# Patient Record
Sex: MALE | Race: OTHER | NOT HISPANIC OR LATINO
[De-identification: names, ages, dates, MRNs, and addresses within clinical notes are randomized per-mention and may not be internally consistent; named-entity substitution may affect disease eponyms.]

---

## 2020-12-21 ENCOUNTER — TRANSCRIPTION ENCOUNTER (OUTPATIENT)
Age: 50
End: 2020-12-21

## 2021-06-08 ENCOUNTER — TRANSCRIPTION ENCOUNTER (OUTPATIENT)
Age: 51
End: 2021-06-08

## 2021-07-14 ENCOUNTER — TRANSCRIPTION ENCOUNTER (OUTPATIENT)
Age: 51
End: 2021-07-14

## 2021-08-03 ENCOUNTER — TRANSCRIPTION ENCOUNTER (OUTPATIENT)
Age: 51
End: 2021-08-03

## 2022-05-03 ENCOUNTER — APPOINTMENT (OUTPATIENT)
Dept: OPHTHALMOLOGY | Facility: CLINIC | Age: 52
End: 2022-05-03

## 2022-05-05 ENCOUNTER — EMERGENCY (EMERGENCY)
Facility: HOSPITAL | Age: 52
LOS: 1 days | Discharge: HOME | End: 2022-05-05
Attending: EMERGENCY MEDICINE | Admitting: EMERGENCY MEDICINE
Payer: COMMERCIAL

## 2022-05-05 VITALS
DIASTOLIC BLOOD PRESSURE: 76 MMHG | OXYGEN SATURATION: 96 % | HEIGHT: 71 IN | WEIGHT: 228.62 LBS | RESPIRATION RATE: 16 BRPM | SYSTOLIC BLOOD PRESSURE: 112 MMHG | HEART RATE: 92 BPM | TEMPERATURE: 98 F

## 2022-05-05 VITALS
SYSTOLIC BLOOD PRESSURE: 135 MMHG | RESPIRATION RATE: 14 BRPM | HEART RATE: 71 BPM | OXYGEN SATURATION: 99 % | TEMPERATURE: 98 F | DIASTOLIC BLOOD PRESSURE: 71 MMHG

## 2022-05-05 DIAGNOSIS — Z98.890 OTHER SPECIFIED POSTPROCEDURAL STATES: Chronic | ICD-10-CM

## 2022-05-05 LAB
ALBUMIN SERPL ELPH-MCNC: 3.9 G/DL — SIGNIFICANT CHANGE UP (ref 3.3–5)
ALP SERPL-CCNC: 100 U/L — SIGNIFICANT CHANGE UP (ref 30–120)
ALT FLD-CCNC: 37 U/L DA — SIGNIFICANT CHANGE UP (ref 10–60)
ANION GAP SERPL CALC-SCNC: 5 MMOL/L — SIGNIFICANT CHANGE UP (ref 5–17)
APTT BLD: 27.3 SEC — LOW (ref 27.5–35.5)
AST SERPL-CCNC: 27 U/L — SIGNIFICANT CHANGE UP (ref 10–40)
BASOPHILS # BLD AUTO: 0.01 K/UL — SIGNIFICANT CHANGE UP (ref 0–0.2)
BASOPHILS NFR BLD AUTO: 0.3 % — SIGNIFICANT CHANGE UP (ref 0–2)
BILIRUB SERPL-MCNC: 0.3 MG/DL — SIGNIFICANT CHANGE UP (ref 0.2–1.2)
BUN SERPL-MCNC: 29 MG/DL — HIGH (ref 7–23)
CALCIUM SERPL-MCNC: 9.3 MG/DL — SIGNIFICANT CHANGE UP (ref 8.4–10.5)
CHLORIDE SERPL-SCNC: 100 MMOL/L — SIGNIFICANT CHANGE UP (ref 96–108)
CO2 SERPL-SCNC: 28 MMOL/L — SIGNIFICANT CHANGE UP (ref 22–31)
CREAT SERPL-MCNC: 1.12 MG/DL — SIGNIFICANT CHANGE UP (ref 0.5–1.3)
D DIMER BLD IA.RAPID-MCNC: <150 NG/ML DDU — SIGNIFICANT CHANGE UP
EGFR: 80 ML/MIN/1.73M2 — SIGNIFICANT CHANGE UP
EOSINOPHIL # BLD AUTO: 0.03 K/UL — SIGNIFICANT CHANGE UP (ref 0–0.5)
EOSINOPHIL NFR BLD AUTO: 0.8 % — SIGNIFICANT CHANGE UP (ref 0–6)
GLUCOSE BLDC GLUCOMTR-MCNC: 135 MG/DL — HIGH (ref 70–99)
GLUCOSE SERPL-MCNC: 133 MG/DL — HIGH (ref 70–99)
HCT VFR BLD CALC: 43.5 % — SIGNIFICANT CHANGE UP (ref 39–50)
HGB BLD-MCNC: 14.5 G/DL — SIGNIFICANT CHANGE UP (ref 13–17)
IMM GRANULOCYTES NFR BLD AUTO: 0.3 % — SIGNIFICANT CHANGE UP (ref 0–1.5)
INR BLD: 1.09 RATIO — SIGNIFICANT CHANGE UP (ref 0.88–1.16)
LYMPHOCYTES # BLD AUTO: 0.85 K/UL — LOW (ref 1–3.3)
LYMPHOCYTES # BLD AUTO: 21.4 % — SIGNIFICANT CHANGE UP (ref 13–44)
MCHC RBC-ENTMCNC: 28.2 PG — SIGNIFICANT CHANGE UP (ref 27–34)
MCHC RBC-ENTMCNC: 33.3 GM/DL — SIGNIFICANT CHANGE UP (ref 32–36)
MCV RBC AUTO: 84.6 FL — SIGNIFICANT CHANGE UP (ref 80–100)
MONOCYTES # BLD AUTO: 0.17 K/UL — SIGNIFICANT CHANGE UP (ref 0–0.9)
MONOCYTES NFR BLD AUTO: 4.3 % — SIGNIFICANT CHANGE UP (ref 2–14)
NEUTROPHILS # BLD AUTO: 2.9 K/UL — SIGNIFICANT CHANGE UP (ref 1.8–7.4)
NEUTROPHILS NFR BLD AUTO: 72.9 % — SIGNIFICANT CHANGE UP (ref 43–77)
NRBC # BLD: 0 /100 WBCS — SIGNIFICANT CHANGE UP (ref 0–0)
PLATELET # BLD AUTO: 312 K/UL — SIGNIFICANT CHANGE UP (ref 150–400)
POTASSIUM SERPL-MCNC: 4.4 MMOL/L — SIGNIFICANT CHANGE UP (ref 3.5–5.3)
POTASSIUM SERPL-SCNC: 4.4 MMOL/L — SIGNIFICANT CHANGE UP (ref 3.5–5.3)
PROT SERPL-MCNC: 7.9 G/DL — SIGNIFICANT CHANGE UP (ref 6–8.3)
PROTHROM AB SERPL-ACNC: 12.6 SEC — SIGNIFICANT CHANGE UP (ref 10.5–13.4)
RBC # BLD: 5.14 M/UL — SIGNIFICANT CHANGE UP (ref 4.2–5.8)
RBC # FLD: 13.2 % — SIGNIFICANT CHANGE UP (ref 10.3–14.5)
SARS-COV-2 RNA SPEC QL NAA+PROBE: SIGNIFICANT CHANGE UP
SODIUM SERPL-SCNC: 133 MMOL/L — LOW (ref 135–145)
TROPONIN I, HIGH SENSITIVITY RESULT: 6 NG/L — SIGNIFICANT CHANGE UP
WBC # BLD: 3.97 K/UL — SIGNIFICANT CHANGE UP (ref 3.8–10.5)
WBC # FLD AUTO: 3.97 K/UL — SIGNIFICANT CHANGE UP (ref 3.8–10.5)

## 2022-05-05 PROCEDURE — 70450 CT HEAD/BRAIN W/O DYE: CPT | Mod: MG

## 2022-05-05 PROCEDURE — 70496 CT ANGIOGRAPHY HEAD: CPT | Mod: 26,MG

## 2022-05-05 PROCEDURE — 70496 CT ANGIOGRAPHY HEAD: CPT | Mod: MG

## 2022-05-05 PROCEDURE — 82962 GLUCOSE BLOOD TEST: CPT

## 2022-05-05 PROCEDURE — 93005 ELECTROCARDIOGRAM TRACING: CPT

## 2022-05-05 PROCEDURE — 80053 COMPREHEN METABOLIC PANEL: CPT

## 2022-05-05 PROCEDURE — 85379 FIBRIN DEGRADATION QUANT: CPT

## 2022-05-05 PROCEDURE — 84484 ASSAY OF TROPONIN QUANT: CPT

## 2022-05-05 PROCEDURE — 87635 SARS-COV-2 COVID-19 AMP PRB: CPT

## 2022-05-05 PROCEDURE — 36415 COLL VENOUS BLD VENIPUNCTURE: CPT

## 2022-05-05 PROCEDURE — 96374 THER/PROPH/DIAG INJ IV PUSH: CPT | Mod: XU

## 2022-05-05 PROCEDURE — 99285 EMERGENCY DEPT VISIT HI MDM: CPT | Mod: 25

## 2022-05-05 PROCEDURE — 70498 CT ANGIOGRAPHY NECK: CPT | Mod: MA

## 2022-05-05 PROCEDURE — 93010 ELECTROCARDIOGRAM REPORT: CPT

## 2022-05-05 PROCEDURE — 85025 COMPLETE CBC W/AUTO DIFF WBC: CPT

## 2022-05-05 PROCEDURE — 85730 THROMBOPLASTIN TIME PARTIAL: CPT

## 2022-05-05 PROCEDURE — G1004: CPT

## 2022-05-05 PROCEDURE — 99285 EMERGENCY DEPT VISIT HI MDM: CPT

## 2022-05-05 PROCEDURE — 70498 CT ANGIOGRAPHY NECK: CPT | Mod: 26,MA

## 2022-05-05 PROCEDURE — 96361 HYDRATE IV INFUSION ADD-ON: CPT

## 2022-05-05 PROCEDURE — 85610 PROTHROMBIN TIME: CPT

## 2022-05-05 RX ORDER — ONDANSETRON 8 MG/1
4 TABLET, FILM COATED ORAL ONCE
Refills: 0 | Status: COMPLETED | OUTPATIENT
Start: 2022-05-05 | End: 2022-05-05

## 2022-05-05 RX ORDER — FOLIC ACID 0.8 MG
1 TABLET ORAL
Qty: 0 | Refills: 0 | DISCHARGE

## 2022-05-05 RX ORDER — SODIUM CHLORIDE 9 MG/ML
1000 INJECTION INTRAMUSCULAR; INTRAVENOUS; SUBCUTANEOUS ONCE
Refills: 0 | Status: COMPLETED | OUTPATIENT
Start: 2022-05-05 | End: 2022-05-05

## 2022-05-05 RX ORDER — METHOTREXATE 2.5 MG/1
6 TABLET ORAL
Qty: 0 | Refills: 0 | DISCHARGE

## 2022-05-05 RX ORDER — MECLIZINE HCL 12.5 MG
25 TABLET ORAL ONCE
Refills: 0 | Status: COMPLETED | OUTPATIENT
Start: 2022-05-05 | End: 2022-05-05

## 2022-05-05 RX ORDER — MECLIZINE HCL 12.5 MG
1 TABLET ORAL
Qty: 15 | Refills: 0
Start: 2022-05-05 | End: 2022-05-09

## 2022-05-05 RX ORDER — NEBIVOLOL HYDROCHLORIDE 5 MG/1
1 TABLET ORAL
Qty: 0 | Refills: 0 | DISCHARGE

## 2022-05-05 RX ORDER — ONDANSETRON 8 MG/1
1 TABLET, FILM COATED ORAL
Qty: 15 | Refills: 0
Start: 2022-05-05 | End: 2022-05-09

## 2022-05-05 RX ADMIN — ONDANSETRON 4 MILLIGRAM(S): 8 TABLET, FILM COATED ORAL at 09:11

## 2022-05-05 RX ADMIN — SODIUM CHLORIDE 1000 MILLILITER(S): 9 INJECTION INTRAMUSCULAR; INTRAVENOUS; SUBCUTANEOUS at 10:21

## 2022-05-05 RX ADMIN — SODIUM CHLORIDE 1000 MILLILITER(S): 9 INJECTION INTRAMUSCULAR; INTRAVENOUS; SUBCUTANEOUS at 09:11

## 2022-05-05 RX ADMIN — Medication 25 MILLIGRAM(S): at 09:12

## 2022-05-05 NOTE — ED PROVIDER NOTE - OBJECTIVE STATEMENT
pt c/o room spinning dizziness, since awoke at 0300 this am. pt relates was in normal state of health when went to bed approx 2200 last night. dizziness worse with positional changes, associated with nausea and vomiting. no fevers, chills, ha, cp, palp, sob, abd pain, weakness or numbness.  pmd - [facility] ([facility])

## 2022-05-05 NOTE — ED PROVIDER NOTE - CLINICAL SUMMARY MEDICAL DECISION MAKING FREE TEXT BOX
pt with room spinning dizziness since awoke at 0300 this am, associated with n/v, worse with positional changes - ekg/ct/labs/ivf/zofran/antivert

## 2022-05-05 NOTE — ED PROVIDER NOTE - PROGRESS NOTE DETAILS
Reevaluated patient at bedside.  Patient feeling much improved, symptoms resolved after antivert and zofran.  Discussed the results of all diagnostic testing in ED and copies of all reports given.   An opportunity to ask questions was given.  Discussed the importance of prompt, close medical follow-up.  Patient will return with any changes, concerns or persistent / worsening symptoms.  Understanding of all instructions verbalized.

## 2022-05-05 NOTE — ED PROVIDER NOTE - PATIENT PORTAL LINK FT
You can access the FollowMyHealth Patient Portal offered by [facility] by registering at the following website: [email or web address]. By joining [employer]’s FollowMyHealth portal, you will also be able to view your health information using other applications (apps) compatible with our system.

## 2022-05-05 NOTE — ED PROVIDER NOTE - PROVIDER TOKENS
PROVIDER:[TOKEN:[[id number]],FOLLOWUP:[1-3 Days]],FREE:[LAST:[your cardiologist],PHONE:[(   )    -],FAX:[(   )    -],FOLLOWUP:[1-3 Days]]

## 2022-05-05 NOTE — ED ADULT NURSE NOTE - CHPI ED NUR SYMPTOMS NEG
no blurred vision/no change in level of consciousness/no confusion/no fever/no loss of consciousness/no vomiting/no weakness

## 2022-05-05 NOTE — ED PROVIDER NOTE - CARE PROVIDER_API CALL
Sinan Luu  NEUROLOGY  [address] [place] [address]  Phone: [phone]  Fax: [phone]  Follow Up Time: 1-3 Days    your cardiologist,   Phone: (   )    -  Fax: (   )    -  Follow Up Time: 1-3 Days

## 2022-05-10 PROBLEM — M06.9 RHEUMATOID ARTHRITIS, UNSPECIFIED: Chronic | Status: ACTIVE | Noted: 2022-05-05

## 2022-05-10 PROBLEM — I48.91 UNSPECIFIED ATRIAL FIBRILLATION: Chronic | Status: ACTIVE | Noted: 2022-05-05

## 2022-05-24 ENCOUNTER — APPOINTMENT (OUTPATIENT)
Dept: OPHTHALMOLOGY | Facility: CLINIC | Age: 52
End: 2022-05-24

## 2022-07-29 NOTE — ED PROVIDER NOTE - CARE PLAN
Medical Necessity Statement: Based on my medical judgement, Mohs surgery is the most appropriate treatment for this cancer compared to other treatments. Principal Discharge DX:	Vertigo   1

## 2023-04-15 ENCOUNTER — NON-APPOINTMENT (OUTPATIENT)
Age: 53
End: 2023-04-15

## 2023-04-16 ENCOUNTER — NON-APPOINTMENT (OUTPATIENT)
Age: 53
End: 2023-04-16

## 2023-05-30 ENCOUNTER — NON-APPOINTMENT (OUTPATIENT)
Age: 53
End: 2023-05-30

## 2023-05-30 ENCOUNTER — APPOINTMENT (OUTPATIENT)
Dept: OPHTHALMOLOGY | Facility: CLINIC | Age: 53
End: 2023-05-30
Payer: COMMERCIAL

## 2023-05-30 PROCEDURE — 99204 OFFICE O/P NEW MOD 45 MIN: CPT

## 2023-05-30 PROCEDURE — 92133 CPTRZD OPH DX IMG PST SGM ON: CPT

## 2023-07-31 ENCOUNTER — APPOINTMENT (OUTPATIENT)
Dept: OPHTHALMOLOGY | Facility: CLINIC | Age: 53
End: 2023-07-31

## 2023-09-19 ENCOUNTER — APPOINTMENT (OUTPATIENT)
Dept: RHEUMATOLOGY | Facility: CLINIC | Age: 53
End: 2023-09-19

## 2023-09-28 ENCOUNTER — APPOINTMENT (OUTPATIENT)
Dept: CARDIOLOGY | Facility: CLINIC | Age: 53
End: 2023-09-28

## 2023-10-19 ENCOUNTER — APPOINTMENT (OUTPATIENT)
Dept: OPHTHALMOLOGY | Facility: CLINIC | Age: 53
End: 2023-10-19

## 2023-12-01 ENCOUNTER — APPOINTMENT (OUTPATIENT)
Dept: CARDIOLOGY | Facility: CLINIC | Age: 53
End: 2023-12-01
Payer: COMMERCIAL

## 2023-12-01 VITALS
BODY MASS INDEX: 33.88 KG/M2 | HEART RATE: 70 BPM | OXYGEN SATURATION: 95 % | WEIGHT: 242 LBS | SYSTOLIC BLOOD PRESSURE: 148 MMHG | DIASTOLIC BLOOD PRESSURE: 80 MMHG | HEIGHT: 71 IN

## 2023-12-01 VITALS — DIASTOLIC BLOOD PRESSURE: 80 MMHG | SYSTOLIC BLOOD PRESSURE: 128 MMHG

## 2023-12-01 DIAGNOSIS — Z00.00 ENCOUNTER FOR GENERAL ADULT MEDICAL EXAMINATION W/OUT ABNORMAL FINDINGS: ICD-10-CM

## 2023-12-01 DIAGNOSIS — I48.0 PAROXYSMAL ATRIAL FIBRILLATION: ICD-10-CM

## 2023-12-01 DIAGNOSIS — Z87.891 PERSONAL HISTORY OF NICOTINE DEPENDENCE: ICD-10-CM

## 2023-12-01 DIAGNOSIS — I10 ESSENTIAL (PRIMARY) HYPERTENSION: ICD-10-CM

## 2023-12-01 DIAGNOSIS — R07.89 OTHER CHEST PAIN: ICD-10-CM

## 2023-12-01 PROCEDURE — 99205 OFFICE O/P NEW HI 60 MIN: CPT

## 2023-12-01 PROCEDURE — 93000 ELECTROCARDIOGRAM COMPLETE: CPT

## 2023-12-01 RX ORDER — DRONEDARONE 400 MG/1
400 TABLET, FILM COATED ORAL TWICE DAILY
Qty: 60 | Refills: 1 | Status: ACTIVE | COMMUNITY
Start: 2023-12-01

## 2023-12-01 RX ORDER — LOSARTAN POTASSIUM 25 MG/1
25 TABLET, FILM COATED ORAL DAILY
Qty: 3 | Refills: 3 | Status: ACTIVE | COMMUNITY
Start: 2023-12-01

## 2023-12-01 RX ORDER — MYCOPHENOLATE MOFETIL 500 MG/1
500 TABLET ORAL TWICE DAILY
Refills: 0 | Status: ACTIVE | COMMUNITY
Start: 2023-12-01

## 2023-12-01 RX ORDER — APIXABAN 5 MG/1
5 TABLET, FILM COATED ORAL
Qty: 60 | Refills: 2 | Status: ACTIVE | COMMUNITY
Start: 2023-12-01

## 2023-12-01 RX ORDER — CARVEDILOL 3.12 MG/1
3.12 TABLET, FILM COATED ORAL
Refills: 0 | Status: ACTIVE | COMMUNITY
Start: 2023-12-01

## 2023-12-12 ENCOUNTER — NON-APPOINTMENT (OUTPATIENT)
Age: 53
End: 2023-12-12

## 2023-12-21 ENCOUNTER — APPOINTMENT (OUTPATIENT)
Dept: CARDIOLOGY | Facility: CLINIC | Age: 53
End: 2023-12-21

## 2023-12-22 ENCOUNTER — APPOINTMENT (OUTPATIENT)
Dept: CARDIOLOGY | Facility: CLINIC | Age: 53
End: 2023-12-22

## 2024-01-25 ENCOUNTER — NON-APPOINTMENT (OUTPATIENT)
Age: 54
End: 2024-01-25

## 2024-03-23 ENCOUNTER — NON-APPOINTMENT (OUTPATIENT)
Age: 54
End: 2024-03-23

## 2024-04-12 ENCOUNTER — APPOINTMENT (OUTPATIENT)
Dept: CARDIOLOGY | Facility: CLINIC | Age: 54
End: 2024-04-12

## 2024-05-14 ENCOUNTER — NON-APPOINTMENT (OUTPATIENT)
Age: 54
End: 2024-05-14

## 2024-09-24 ENCOUNTER — NON-APPOINTMENT (OUTPATIENT)
Age: 54
End: 2024-09-24

## 2024-09-24 ENCOUNTER — APPOINTMENT (OUTPATIENT)
Dept: OPHTHALMOLOGY | Facility: CLINIC | Age: 54
End: 2024-09-24
Payer: COMMERCIAL

## 2024-09-24 PROCEDURE — 92133 CPTRZD OPH DX IMG PST SGM ON: CPT

## 2024-09-24 PROCEDURE — 76514 ECHO EXAM OF EYE THICKNESS: CPT

## 2024-09-24 PROCEDURE — 99215 OFFICE O/P EST HI 40 MIN: CPT

## 2024-09-30 ENCOUNTER — APPOINTMENT (OUTPATIENT)
Dept: ELECTROPHYSIOLOGY | Facility: CLINIC | Age: 54
End: 2024-09-30
Payer: COMMERCIAL

## 2024-09-30 ENCOUNTER — NON-APPOINTMENT (OUTPATIENT)
Age: 54
End: 2024-09-30

## 2024-09-30 VITALS
HEIGHT: 71 IN | WEIGHT: 248 LBS | BODY MASS INDEX: 34.72 KG/M2 | SYSTOLIC BLOOD PRESSURE: 106 MMHG | DIASTOLIC BLOOD PRESSURE: 76 MMHG

## 2024-09-30 DIAGNOSIS — I10 ESSENTIAL (PRIMARY) HYPERTENSION: ICD-10-CM

## 2024-09-30 DIAGNOSIS — I48.4 ATYPICAL ATRIAL FLUTTER: ICD-10-CM

## 2024-09-30 DIAGNOSIS — H15.009 UNSPECIFIED SCLERITIS, UNSPECIFIED EYE: ICD-10-CM

## 2024-09-30 DIAGNOSIS — I48.0 PAROXYSMAL ATRIAL FIBRILLATION: ICD-10-CM

## 2024-09-30 PROCEDURE — 93000 ELECTROCARDIOGRAM COMPLETE: CPT

## 2024-09-30 PROCEDURE — 99205 OFFICE O/P NEW HI 60 MIN: CPT

## 2024-09-30 RX ORDER — PREDNISONE 5 MG/1
5 TABLET ORAL
Refills: 0 | Status: ACTIVE | COMMUNITY
Start: 2024-09-30

## 2024-09-30 RX ORDER — METOPROLOL SUCCINATE 25 MG/1
25 TABLET, EXTENDED RELEASE ORAL
Qty: 30 | Refills: 5 | Status: ACTIVE | COMMUNITY
Start: 2024-09-30 | End: 1900-01-01

## 2024-10-01 NOTE — HISTORY OF PRESENT ILLNESS
[FreeTextEntry1] : Javier Mckeon is a 53y/o man with Hx of HTN, Scleritis (follows with Rheumatology, on prednisone), and aflutter/afib since 2016, afib ablation at [facility] in 2016, most recent in [facility] 2023, (mitral flutter and PVI) now on Multaq 400 BID and remains on Eliquis who presents today for initial evaluation. Recently saw Cardiologist and was noted to be back in afib/aflutter. Has occasional palpitations as well as orthostasis. Denies chest pain, SOB, syncope or near syncope. Remains on Multaq and Eliquis.

## 2024-10-01 NOTE — HISTORY OF PRESENT ILLNESS
[FreeTextEntry1] : Javier Mckeon is a 53y/o man with Hx of HTN, Scleritis (follows with Rheumatology, on prednisone), and aflutter/afib since 2016, afib ablation at [facility] in 2016, most recent in [facility] 2023, (mitral flutter and PVI) now on Multaq 400 BID and remains on Eliquis who presents today for initial evaluation. Recently saw Cardiologist and was noted to be back in afib/aflutter. Has occasional palpitations as well as orthostasis. Denies chest pain, SOB, syncope or near syncope. Remains on Multaq and Eliquis.

## 2024-10-01 NOTE — CARDIOLOGY SUMMARY
[de-identified] : 9/30/24  [de-identified] : 2023: normal LVEF, 60%  Humira Counseling:  I discussed with the patient the risks of adalimumab including but not limited to myelosuppression, immunosuppression, autoimmune hepatitis, demyelinating diseases, lymphoma, and serious infections.  The patient understands that monitoring is required including a PPD at baseline and must alert us or the primary physician if symptoms of infection or other concerning signs are noted.

## 2024-10-01 NOTE — HISTORY OF PRESENT ILLNESS
[FreeTextEntry1] : Javier Mckeon is a 55y/o man with Hx of HTN, Scleritis (follows with Rheumatology, on prednisone), and aflutter/afib since 2016, afib ablation at [facility] in 2016, most recent in [facility] 2023, (mitral flutter and PVI) now on Multaq 400 BID and remains on Eliquis who presents today for initial evaluation. Recently saw Cardiologist and was noted to be back in afib/aflutter. Has occasional palpitations as well as orthostasis. Denies chest pain, SOB, syncope or near syncope. Remains on Multaq and Eliquis.

## 2024-10-01 NOTE — DISCUSSION/SUMMARY
[EKG obtained to assist in diagnosis and management of assessed problem(s)] : EKG obtained to assist in diagnosis and management of assessed problem(s) [FreeTextEntry1] : Impression:  1. Afib/aflutter: s/p PVI ablation x2, most recent 2023, as well as mitral flutter ablation. EKG performed today to assess for presence of recurrent afib and reveals atrial flutter. Given recurrent persistent atrial flutter despite prior ablation and antiarrhythmics use, recommend undergoing repeat ablation using PFA. Risks, benefits, and alternatives to procedure discussed at length. Risks including that of bleeding, infection, stroke, and cardiac tamponade discussed and he verbalizes understanding of all. Will hold all cardiac medications the morning of the ablation. May take all regularly scheduled medications the night before. Since remains in aflutter despite Multaq use, may discontinue at this time.   2. HTN: resume oral antihypertensives as prescribed. Encouraged heart healthy diet, sodium restriction, and weight loss. Continue regular f/u with Cardiologist for further HTN management.  Plan for afib/aflutter ablation and RTO for f/u post procedure.

## 2024-10-17 ENCOUNTER — OUTPATIENT (OUTPATIENT)
Dept: OUTPATIENT SERVICES | Facility: HOSPITAL | Age: 54
LOS: 1 days | End: 2024-10-17

## 2024-10-17 VITALS
HEIGHT: 69 IN | WEIGHT: 238.1 LBS | RESPIRATION RATE: 16 BRPM | TEMPERATURE: 98 F | SYSTOLIC BLOOD PRESSURE: 132 MMHG | HEART RATE: 85 BPM | OXYGEN SATURATION: 96 % | DIASTOLIC BLOOD PRESSURE: 90 MMHG

## 2024-10-17 DIAGNOSIS — I48.4 ATYPICAL ATRIAL FLUTTER: ICD-10-CM

## 2024-10-17 DIAGNOSIS — I48.91 UNSPECIFIED ATRIAL FIBRILLATION: ICD-10-CM

## 2024-10-17 DIAGNOSIS — M06.9 RHEUMATOID ARTHRITIS, UNSPECIFIED: ICD-10-CM

## 2024-10-17 DIAGNOSIS — Z98.890 OTHER SPECIFIED POSTPROCEDURAL STATES: Chronic | ICD-10-CM

## 2024-10-17 DIAGNOSIS — H15.009 UNSPECIFIED SCLERITIS, UNSPECIFIED EYE: ICD-10-CM

## 2024-10-17 DIAGNOSIS — G47.33 OBSTRUCTIVE SLEEP APNEA (ADULT) (PEDIATRIC): ICD-10-CM

## 2024-10-17 LAB
ANION GAP SERPL CALC-SCNC: 12 MMOL/L — SIGNIFICANT CHANGE UP (ref 7–14)
BLD GP AB SCN SERPL QL: NEGATIVE — SIGNIFICANT CHANGE UP
BUN SERPL-MCNC: 22 MG/DL — SIGNIFICANT CHANGE UP (ref 7–23)
CALCIUM SERPL-MCNC: 9.8 MG/DL — SIGNIFICANT CHANGE UP (ref 8.4–10.5)
CHLORIDE SERPL-SCNC: 99 MMOL/L — SIGNIFICANT CHANGE UP (ref 98–107)
CO2 SERPL-SCNC: 24 MMOL/L — SIGNIFICANT CHANGE UP (ref 22–31)
CREAT SERPL-MCNC: 0.98 MG/DL — SIGNIFICANT CHANGE UP (ref 0.5–1.3)
EGFR: 92 ML/MIN/1.73M2 — SIGNIFICANT CHANGE UP
GLUCOSE SERPL-MCNC: 88 MG/DL — SIGNIFICANT CHANGE UP (ref 70–99)
POTASSIUM SERPL-MCNC: 4.6 MMOL/L — SIGNIFICANT CHANGE UP (ref 3.5–5.3)
POTASSIUM SERPL-SCNC: 4.6 MMOL/L — SIGNIFICANT CHANGE UP (ref 3.5–5.3)
RH IG SCN BLD-IMP: POSITIVE — SIGNIFICANT CHANGE UP
RH IG SCN BLD-IMP: POSITIVE — SIGNIFICANT CHANGE UP
SODIUM SERPL-SCNC: 135 MMOL/L — SIGNIFICANT CHANGE UP (ref 135–145)

## 2024-10-17 NOTE — H&P PST ADULT - NSICDXFAMILYHX_GEN_ALL_CORE_FT
05-Jul-2021 14:18
FAMILY HISTORY:  Father  Still living? Unknown  FH: heart disease, Age at diagnosis: Age Unknown    Mother  Still living? Unknown  FH: stroke, Age at diagnosis: Age Unknown

## 2024-10-17 NOTE — H&P PST ADULT - NSICDXPASTMEDICALHX_GEN_ALL_CORE_FT
PAST MEDICAL HISTORY:  Atrial fibrillation and flutter     Benign essential hypertension     Childhood asthma     HTN (hypertension)     HADLEY (obstructive sleep apnea)     Paroxysmal atrial fibrillation     Rheumatoid arthritis     Scleritis

## 2024-10-17 NOTE — H&P PST ADULT - ASSESSMENT
53 y/o male with paroxysmal atrial fibrillation on Eliquis, HADLEY not on CPAP, Rheumatoid Arthritis, Benign Essential Hypertension, Scleritis on Mycophenolate and atypical atrial flutter presents to [facility] preop for atrial flutter ablation. h/o cardiac ablation in 2016 at [facility] and 2023 at [facility]. Pt recently saw his Cardiologist and found to be back in Afib/ Flutter.

## 2024-10-17 NOTE — H&P PST ADULT - NSICDXPASTSURGICALHX_GEN_ALL_CORE_FT
PAST SURGICAL HISTORY:  H/O cardiac radiofrequency ablation     H/O cardiac radiofrequency ablation

## 2024-10-17 NOTE — H&P PST ADULT - HISTORY OF PRESENT ILLNESS
53 y/o male with paroxysmal atrial fibrillation on Eliquis, HADLEY not on CPAP, Rheumatoid Arthritis, Benign Essential Hypertension, Scleritis on Mycophenolate and atypical atrial flutter presents to [facility] preop for atrial flutter ablation. h/o cardiac ablation in 2016 at [facility] and 2023 at [facility]. Pt recently saw his Cardiologist and found to be back in Afib/ Flutter.

## 2024-10-17 NOTE — H&P PST ADULT - PROBLEM SELECTOR PLAN 1
preop for atrial flutter ablation on 10/23/24  preop instructions given, pt verbalized understanding. As per EP, pt may take all cardiac meds in the morning including anticoagulants.   chlorhexidine wash provided  PST cbc anemia workup, bmp, type and ABO pending

## 2024-10-17 NOTE — H&P PST ADULT - CARDIOVASCULAR COMMENTS
dx of atypical atrial flutter h/o PAF on Eliquis. preop dx of atypical atrial flutter. see HPI. Pt was previously tx with Losartan for HTN but stopped meds due to hypotension resulting in dizziness

## 2024-10-18 ENCOUNTER — TRANSCRIPTION ENCOUNTER (OUTPATIENT)
Age: 54
End: 2024-10-18

## 2024-10-18 LAB
BASOPHILS # BLD AUTO: 0.03 K/UL — SIGNIFICANT CHANGE UP (ref 0–0.2)
BASOPHILS NFR BLD AUTO: 0.8 % — SIGNIFICANT CHANGE UP (ref 0–2)
EOSINOPHIL # BLD AUTO: 0.04 K/UL — SIGNIFICANT CHANGE UP (ref 0–0.5)
EOSINOPHIL NFR BLD AUTO: 1.1 % — SIGNIFICANT CHANGE UP (ref 0–6)
HCT VFR BLD CALC: 48.6 % — SIGNIFICANT CHANGE UP (ref 39–50)
HGB BLD-MCNC: 15.9 G/DL — SIGNIFICANT CHANGE UP (ref 13–17)
IMM GRANULOCYTES NFR BLD AUTO: 0.3 % — SIGNIFICANT CHANGE UP (ref 0–0.9)
LYMPHOCYTES # BLD AUTO: 1.48 K/UL — SIGNIFICANT CHANGE UP (ref 1–3.3)
LYMPHOCYTES # BLD AUTO: 39.8 % — SIGNIFICANT CHANGE UP (ref 13–44)
MCHC RBC-ENTMCNC: 27.1 PG — SIGNIFICANT CHANGE UP (ref 27–34)
MCHC RBC-ENTMCNC: 32.7 GM/DL — SIGNIFICANT CHANGE UP (ref 32–36)
MCV RBC AUTO: 82.9 FL — SIGNIFICANT CHANGE UP (ref 80–100)
MONOCYTES # BLD AUTO: 0.34 K/UL — SIGNIFICANT CHANGE UP (ref 0–0.9)
MONOCYTES NFR BLD AUTO: 9.1 % — SIGNIFICANT CHANGE UP (ref 2–14)
NEUTROPHILS # BLD AUTO: 1.82 K/UL — SIGNIFICANT CHANGE UP (ref 1.8–7.4)
NEUTROPHILS NFR BLD AUTO: 48.9 % — SIGNIFICANT CHANGE UP (ref 43–77)
PLATELET # BLD AUTO: 323 K/UL — SIGNIFICANT CHANGE UP (ref 150–400)
RBC # BLD: 5.86 M/UL — HIGH (ref 4.2–5.8)
RBC # FLD: 12.8 % — SIGNIFICANT CHANGE UP (ref 10.3–14.5)
WBC # BLD: 3.72 K/UL — LOW (ref 3.8–10.5)
WBC # FLD AUTO: 3.72 K/UL — LOW (ref 3.8–10.5)

## 2024-10-22 ENCOUNTER — RX CHANGE (OUTPATIENT)
Age: 54
End: 2024-10-22

## 2024-10-22 RX ORDER — METOPROLOL SUCCINATE 25 MG/1
25 TABLET, EXTENDED RELEASE ORAL
Qty: 90 | Refills: 3 | Status: ACTIVE | COMMUNITY
Start: 1900-01-01 | End: 1900-01-01

## 2024-10-22 NOTE — ASU PATIENT PROFILE, ADULT - BLOOD TRANSFUSION, PREVIOUS, PROFILE
[address] [place] [address],[address] [facility] [address]  Phone: [phone]  Fax: [address], APRN - CNP        August 23, 2021    Diana Capone  [facility] [address]  [facility] [address]      Dear Ele Pro: We have made several attempts to contact you by phone and have   been unsuccessful. Please call our office at your earliest convenience  At [phone] opt [phone]. Thank you.       Sincerely,        Carina Watson, APRN - CNP no

## 2024-10-23 ENCOUNTER — INPATIENT (INPATIENT)
Facility: HOSPITAL | Age: 54
LOS: 0 days | Discharge: HOME | End: 2024-10-24
Attending: STUDENT IN AN ORGANIZED HEALTH CARE EDUCATION/TRAINING PROGRAM | Admitting: STUDENT IN AN ORGANIZED HEALTH CARE EDUCATION/TRAINING PROGRAM
Payer: COMMERCIAL

## 2024-10-23 VITALS
HEART RATE: 62 BPM | HEIGHT: 71 IN | TEMPERATURE: 98 F | RESPIRATION RATE: 16 BRPM | SYSTOLIC BLOOD PRESSURE: 119 MMHG | OXYGEN SATURATION: 96 % | WEIGHT: 238.1 LBS | DIASTOLIC BLOOD PRESSURE: 79 MMHG

## 2024-10-23 DIAGNOSIS — I48.4 ATYPICAL ATRIAL FLUTTER: ICD-10-CM

## 2024-10-23 DIAGNOSIS — Z98.890 OTHER SPECIFIED POSTPROCEDURAL STATES: Chronic | ICD-10-CM

## 2024-10-23 PROCEDURE — 93010 ELECTROCARDIOGRAM REPORT: CPT | Mod: 76

## 2024-10-23 RX ORDER — APIXABAN 5 MG/1
5 TABLET, FILM COATED ORAL
Refills: 0 | Status: DISCONTINUED | OUTPATIENT
Start: 2024-10-23 | End: 2024-10-24

## 2024-10-23 RX ORDER — MYCOPHENOLATE MOFETIL 500 MG/1
1500 TABLET, FILM COATED ORAL DAILY
Refills: 0 | Status: DISCONTINUED | OUTPATIENT
Start: 2024-10-24 | End: 2024-10-24

## 2024-10-23 RX ORDER — SODIUM CHLORIDE 9 MG/ML
3 INJECTION, SOLUTION INTRAMUSCULAR; INTRAVENOUS; SUBCUTANEOUS EVERY 8 HOURS
Refills: 0 | Status: DISCONTINUED | OUTPATIENT
Start: 2024-10-23 | End: 2024-10-24

## 2024-10-23 RX ORDER — METOPROLOL TARTRATE 50 MG
25 TABLET ORAL DAILY
Refills: 0 | Status: DISCONTINUED | OUTPATIENT
Start: 2024-10-23 | End: 2024-10-24

## 2024-10-23 RX ORDER — APIXABAN 5 MG/1
1 TABLET, FILM COATED ORAL
Refills: 0 | DISCHARGE

## 2024-10-23 RX ORDER — ACETAMINOPHEN 500 MG
650 TABLET ORAL ONCE
Refills: 0 | Status: COMPLETED | OUTPATIENT
Start: 2024-10-23 | End: 2024-10-23

## 2024-10-23 RX ORDER — MYCOPHENOLATE MOFETIL 500 MG/1
1000 TABLET, FILM COATED ORAL AT BEDTIME
Refills: 0 | Status: DISCONTINUED | OUTPATIENT
Start: 2024-10-23 | End: 2024-10-24

## 2024-10-23 RX ORDER — INFLUENZ VIR VAC TV P-SURF2003 15MCG/.5ML
0.5 SYRINGE (ML) INTRAMUSCULAR ONCE
Refills: 0 | Status: DISCONTINUED | OUTPATIENT
Start: 2024-10-23 | End: 2024-10-24

## 2024-10-23 RX ORDER — PREDNISONE 5 MG/1
6 TABLET ORAL
Refills: 0 | DISCHARGE

## 2024-10-23 RX ADMIN — MYCOPHENOLATE MOFETIL 1000 MILLIGRAM(S): 500 TABLET, FILM COATED ORAL at 21:54

## 2024-10-23 RX ADMIN — SODIUM CHLORIDE 3 MILLILITER(S): 9 INJECTION, SOLUTION INTRAMUSCULAR; INTRAVENOUS; SUBCUTANEOUS at 23:26

## 2024-10-23 RX ADMIN — Medication 650 MILLIGRAM(S): at 23:31

## 2024-10-23 NOTE — CHART NOTE - NSCHARTNOTEFT_GEN_A_CORE
[facility] Medicine Night Coverage     Patient is s/p cardiac cath via right femoral access. Patient without any complaints. Site is stable with no hematoma or active bleed noted. No swelling, dressing is clean/intact/dry. Right femoral pulse palpable. Strength is equal bilaterally. Patient has full ROM in all 4 extremities. Capillary refill < 2 seconds. Denies CP, SOB, numbness/tingling along RLE.   Will restart Eliquis in AM .     Zulema Bunn PA-C  Department of Medicine   [id number]

## 2024-10-23 NOTE — PATIENT PROFILE ADULT - FALL HARM RISK - HARM RISK INTERVENTIONS

## 2024-10-23 NOTE — CHART NOTE - NSCHARTNOTEFT_GEN_A_CORE
Spoke with Dr. Waldrop and he recommended to restart patient's Eliquis medication tomorrow(10/24) in the morning if right groin sites are stable. This was relayed to covering night ACP.

## 2024-10-23 NOTE — PATIENT PROFILE ADULT - OVER THE PAST TWO WEEKS HAVE YOU FELT DOWN, DEPRESSED OR HOPELESS?
Pt ambulatory to bathroom and returns to room. Pt pleasant, cooperative, alert, answers questions appropriately, ambulates with steady gait and without assistance. Pt provided with lunch tray and extra diet soda per his request. Pt updated on plan of care. no

## 2024-10-24 ENCOUNTER — TRANSCRIPTION ENCOUNTER (OUTPATIENT)
Age: 54
End: 2024-10-24

## 2024-10-24 VITALS
TEMPERATURE: 98 F | SYSTOLIC BLOOD PRESSURE: 120 MMHG | HEART RATE: 94 BPM | DIASTOLIC BLOOD PRESSURE: 83 MMHG | RESPIRATION RATE: 16 BRPM | OXYGEN SATURATION: 98 %

## 2024-10-24 LAB
ANION GAP SERPL CALC-SCNC: 11 MMOL/L — SIGNIFICANT CHANGE UP (ref 7–14)
BUN SERPL-MCNC: 22 MG/DL — SIGNIFICANT CHANGE UP (ref 7–23)
CALCIUM SERPL-MCNC: 9.2 MG/DL — SIGNIFICANT CHANGE UP (ref 8.4–10.5)
CHLORIDE SERPL-SCNC: 100 MMOL/L — SIGNIFICANT CHANGE UP (ref 98–107)
CO2 SERPL-SCNC: 25 MMOL/L — SIGNIFICANT CHANGE UP (ref 22–31)
CREAT SERPL-MCNC: 0.88 MG/DL — SIGNIFICANT CHANGE UP (ref 0.5–1.3)
EGFR: 102 ML/MIN/1.73M2 — SIGNIFICANT CHANGE UP
GLUCOSE SERPL-MCNC: 119 MG/DL — HIGH (ref 70–99)
HCT VFR BLD CALC: 45.9 % — SIGNIFICANT CHANGE UP (ref 39–50)
HGB BLD-MCNC: 14.4 G/DL — SIGNIFICANT CHANGE UP (ref 13–17)
MAGNESIUM SERPL-MCNC: 1.9 MG/DL — SIGNIFICANT CHANGE UP (ref 1.6–2.6)
MCHC RBC-ENTMCNC: 26.5 PG — LOW (ref 27–34)
MCHC RBC-ENTMCNC: 31.4 GM/DL — LOW (ref 32–36)
MCV RBC AUTO: 84.5 FL — SIGNIFICANT CHANGE UP (ref 80–100)
NRBC # BLD: 0 /100 WBCS — SIGNIFICANT CHANGE UP (ref 0–0)
NRBC # FLD: 0 K/UL — SIGNIFICANT CHANGE UP (ref 0–0)
PLATELET # BLD AUTO: 286 K/UL — SIGNIFICANT CHANGE UP (ref 150–400)
POTASSIUM SERPL-MCNC: 4.2 MMOL/L — SIGNIFICANT CHANGE UP (ref 3.5–5.3)
POTASSIUM SERPL-SCNC: 4.2 MMOL/L — SIGNIFICANT CHANGE UP (ref 3.5–5.3)
RBC # BLD: 5.43 M/UL — SIGNIFICANT CHANGE UP (ref 4.2–5.8)
RBC # FLD: 13.1 % — SIGNIFICANT CHANGE UP (ref 10.3–14.5)
SODIUM SERPL-SCNC: 136 MMOL/L — SIGNIFICANT CHANGE UP (ref 135–145)
WBC # BLD: 6.6 K/UL — SIGNIFICANT CHANGE UP (ref 3.8–10.5)
WBC # FLD AUTO: 6.6 K/UL — SIGNIFICANT CHANGE UP (ref 3.8–10.5)

## 2024-10-24 PROCEDURE — 99231 SBSQ HOSP IP/OBS SF/LOW 25: CPT

## 2024-10-24 RX ORDER — METOPROLOL TARTRATE 50 MG
1 TABLET ORAL
Refills: 0 | DISCHARGE

## 2024-10-24 RX ORDER — METOPROLOL TARTRATE 50 MG
1 TABLET ORAL
Qty: 30 | Refills: 0
Start: 2024-10-24 | End: 2024-11-22

## 2024-10-24 RX ADMIN — Medication 25 MILLIGRAM(S): at 11:00

## 2024-10-24 RX ADMIN — APIXABAN 5 MILLIGRAM(S): 5 TABLET, FILM COATED ORAL at 06:40

## 2024-10-24 RX ADMIN — SODIUM CHLORIDE 3 MILLILITER(S): 9 INJECTION, SOLUTION INTRAMUSCULAR; INTRAVENOUS; SUBCUTANEOUS at 06:29

## 2024-10-24 RX ADMIN — MYCOPHENOLATE MOFETIL 1500 MILLIGRAM(S): 500 TABLET, FILM COATED ORAL at 11:00

## 2024-10-24 RX ADMIN — Medication 6 MILLIGRAM(S): at 06:40

## 2024-10-24 RX ADMIN — Medication 650 MILLIGRAM(S): at 00:02

## 2024-10-24 NOTE — DISCHARGE NOTE NURSING/CASE MANAGEMENT/SOCIAL WORK - FINANCIAL ASSISTANCE
[facility] provides services at a reduced cost to those who are determined to be eligible through [facility]’s financial assistance program. Information regarding [facility]’s financial assistance program can be found by going to [email or web address] or by calling [phone].

## 2024-10-24 NOTE — DISCHARGE NOTE PROVIDER - NSDCFUSCHEDAPPT_GEN_ALL_CORE_FT
Goldberg, Naomi  [facility] Physician [facility]  OPHTHALM [address]  Scheduled Appointment: 12/03/2024    Nori Waldrop  [facility]  ELECTROPH [phone]  Scheduled Appointment: 12/23/2024

## 2024-10-24 NOTE — DISCHARGE NOTE PROVIDER - NSDCMRMEDTOKEN_GEN_ALL_CORE_FT
Eliquis 5 mg oral tablet: 1 tab(s) orally 2 times a day  Metoprolol Succinate ER 25 mg oral tablet, extended release: 1 tab(s) orally once a day  mycophenolate mofetil 500 mg oral tablet: 2 tab(s) orally once a day (in the evening)  mycophenolate mofetil 500 mg oral tablet: 3 tab(s) orally once a day (in the morning)  predniSONE: 6 milligram(s) orally once a day

## 2024-10-24 NOTE — PROGRESS NOTE ADULT - ASSESSMENT
55 y/o male with paroxysmal atrial fibrillation on Eliquis, HADLEY not on CPAP, Rheumatoid Arthritis, Benign Essential Hypertension, Scleritis on Mycophenolate and atypical atrial flutter presents to [facility] preop for atrial flutter ablation. h/o cardiac ablation in 2016 at [facility] and 2023 at [facility] s/p ablation on 10/23      Groin site dress removed; no bleeding or hematoma noted   Discharge instructions reviewed with patient and written instructions given   Patient understands he will take off groin dressing 24 hours from procedure and then can shower with only water at the site; no soap, lotion, cream is to be applied to site ; patient will not put any dressing on the site once the initial dressing is removed  Site will not be submerged under water x 1 week including activites in the pool, bath or jacuzzi   Activity restrictions went over with patient including 1 week of refraining from strenuous activity including jogging, running, or driving   Any indication of infection including fever, chills, redness, swelling or discharge at the site - patient will call office [phone]  For serious symptoms like chest pain, SOB, lightheadedness, dizziness or syncope - patient was directed to the closest ER  All medications and diet reviewed   Follow date and time given 12/23 at 9AM  [phone] [place] [address]   Oncology [facility] 4th [place] [address]   [phone]

## 2024-10-24 NOTE — DISCHARGE NOTE NURSING/CASE MANAGEMENT/SOCIAL WORK - PATIENT PORTAL LINK FT
You can access the FollowMyHealth Patient Portal offered by [facility] by registering at the following website: [email or web address]. By joining [email or web address]’s FollowMyHealth portal, you will also be able to view your health information using other applications (apps) compatible with our system.

## 2024-10-24 NOTE — PROGRESS NOTE ADULT - SUBJECTIVE AND OBJECTIVE BOX
Interval History:  No acute events overnight  Telemetry: NSR   Groin site dress removed; no bleeding or hematoma noted  Patient denies any chest pain, palpitations, lightheadedness, shortness of breath    MEDICATIONS  (STANDING):  apixaban 5 milliGRAM(s) Oral two times a day  influenza   Vaccine 0.5 milliLiter(s) IntraMuscular once  metoprolol succinate ER 25 milliGRAM(s) Oral daily  mycophenolate mofetil 1000 milliGRAM(s) Oral at bedtime  mycophenolate mofetil 1500 milliGRAM(s) Oral daily  predniSONE   Tablet 6 milliGRAM(s) Oral daily  sodium chloride 0.9% lock flush 3 milliLiter(s) IV Push every 8 hours    MEDICATIONS  (PRN):    Vital Signs Last 24 Hrs  T(C): 36.7 (10-24-24 @ 06:43), Max: 36.9 (10-23-24 @ 19:25)  T(F): 98 (10-24-24 @ 06:43), Max: 98.4 (10-23-24 @ 19:25)  HR: 91 (10-24-24 @ 06:43) (62 - 91)  BP: 131/89 (10-24-24 @ 06:43) (97/52 - 131/89)  BP(mean): 61 (10-23-24 @ 20:00) (61 - 77)  RR: 16 (10-24-24 @ 06:43) (12 - 17)  SpO2: 100% (10-24-24 @ 06:43) (94% - 100%)    Appearance: Normal	  HEENT:   Normal oral mucosa, PERRL, EOMI	  Lymphatic: No lymphadenopathy  Cardiovascular: Normal S1 S2, No JVD, No murmurs, No edema  Respiratory: Lungs clear to auscultation	  Psychiatry: A & O x 3, Mood & affect appropriate  Gastrointestinal:  Soft, Non-tender, + BS	  Skin: No rashes, No ecchymoses, No cyanosis	  Neurologic: Non-focal  Extremities: Normal range of motion, No clubbing, cyanosis or edema  Vascular: Peripheral pulses palpable 2+ bilaterally    LABS:	 	    CBC Full  -  ( 24 Oct 2024 06:34 )  WBC Count : 6.60 K/uL  Hemoglobin : 14.4 g/dL  Hematocrit : 45.9 %  Platelet Count - Automated : 286 K/uL  Mean Cell Volume : 84.5 fL  Mean Cell Hemoglobin : 26.5 pg  Mean Cell Hemoglobin Concentration : 31.4 gm/dL  Auto Neutrophil # : x  Auto Lymphocyte # : x  Auto Monocyte # : x  Auto Eosinophil # : x  Auto Basophil # : x  Auto Neutrophil % : x  Auto Lymphocyte % : x  Auto Monocyte % : x  Auto Eosinophil % : x  Auto Basophil % : x    10-24    136  |  100  |  22  ----------------------------<  119[H]  4.2   |  25  |  0.88    Ca    9.2      24 Oct 2024 06:34  Mg     1.90     10-24

## 2024-10-24 NOTE — DISCHARGE NOTE PROVIDER - HOSPITAL COURSE
53 y/o male with paroxysmal atrial fibrillation on Eliquis, HADLEY not on CPAP, Rheumatoid Arthritis, Benign Essential Hypertension, Scleritis on Mycophenolate and atypical atrial flutter presents to [facility] preop for atrial flutter ablation. h/o cardiac ablation in 2016 at [facility] and 2023 at [facility] s/p ablation on 10/23. Now S/P atrial flutter ablation on 10/23/24.       Groin site dress removed; no bleeding or hematoma noted   Discharge instructions reviewed with patient and written instructions given   Patient understands he will take off groin dressing 24 hours from procedure and then can shower with only water at the site; no soap, lotion, cream is to be applied to site ; patient will not put any dressing on the site once the initial dressing is removed  Site will not be submerged under water x 1 week including activities in the pool, bath or jacuzzi   Activity restrictions went over with patient including 1 week of refraining from strenuous activity including jogging, running, or driving   Any indication of infection including fever, chills, redness, swelling or discharge at the site - patient will call office [phone]  For serious symptoms like chest pain, SOB, lightheadedness, dizziness or syncope - patient was directed to the closest ER  All medications and diet reviewed     As per attending, patient stable for discharge.

## 2024-10-24 NOTE — DISCHARGE NOTE PROVIDER - NSDCCPCAREPLAN_GEN_ALL_CORE_FT
PRINCIPAL DISCHARGE DIAGNOSIS  Diagnosis: S/P ablation of atrial flutter  Assessment and Plan of Treatment: Please follow up with your primary care physician regarding your hospitalization and take all medications prescribed.      SECONDARY DISCHARGE DIAGNOSES  Diagnosis: Rheumatoid arthritis  Assessment and Plan of Treatment: Please follow up with your primary care physician regarding your hospitalization and take all medications prescribed.

## 2024-10-24 NOTE — DISCHARGE NOTE PROVIDER - CARE PROVIDER_API CALL
Nori Waldrop  Cardiac Electrophysiology  [address] [place] [address], [address]  [place] [address]  Phone: [phone]  Fax: [phone]  Scheduled Appointment: 12/23/2024 09:00 AM    Followup with PCP,   Phone: (   )    -  Fax: (   )    -  Follow Up Time: 1 week

## 2024-10-24 NOTE — DISCHARGE NOTE PROVIDER - NSDCFUADDINST_GEN_ALL_CORE_FT
Patient understands he will take off groin dressing 24 hours from procedure and then can shower with only water at the site; no soap, lotion, cream is to be applied to site ; patient will not put any dressing on the site once the initial dressing is removed  Site will not be submerged under water x 1 week including activities in the pool, bath or jacuzzi   Activity restrictions went over with patient including 1 week of refraining from strenuous activity including jogging, running, or driving   Any indication of infection including fever, chills, redness, swelling or discharge at the site - patient will call office [phone]

## 2024-10-24 NOTE — DISCHARGE NOTE PROVIDER - NSDCFUADDAPPT_GEN_ALL_CORE_FT
Follow date and time given 12/23 at 9AM  [phone] [place] [address]   Oncology [facility] 4th [place] [address] [phone]

## 2024-10-24 NOTE — DISCHARGE NOTE PROVIDER - PROVIDER TOKENS
PROVIDER:[TOKEN:[[id number]],SCHEDULEDAPPT:[12/23/2024],SCHEDULEDAPPTTIME:[09:00 AM]],FREE:[LAST:[Followup with PCP],PHONE:[(   )    -],FAX:[(   )    -],FOLLOWUP:[1 week]]

## 2024-11-07 ENCOUNTER — INPATIENT (INPATIENT)
Facility: HOSPITAL | Age: 54
LOS: 0 days | Discharge: HOME | End: 2024-11-08
Attending: PSYCHIATRY & NEUROLOGY | Admitting: PSYCHIATRY & NEUROLOGY
Payer: COMMERCIAL

## 2024-11-07 VITALS
TEMPERATURE: 100 F | OXYGEN SATURATION: 96 % | HEART RATE: 84 BPM | SYSTOLIC BLOOD PRESSURE: 161 MMHG | HEIGHT: 71 IN | WEIGHT: 238.98 LBS | DIASTOLIC BLOOD PRESSURE: 101 MMHG | RESPIRATION RATE: 17 BRPM

## 2024-11-07 DIAGNOSIS — Z98.890 OTHER SPECIFIED POSTPROCEDURAL STATES: Chronic | ICD-10-CM

## 2024-11-07 LAB
ALBUMIN SERPL ELPH-MCNC: 4 G/DL — SIGNIFICANT CHANGE UP (ref 3.4–5)
ALP SERPL-CCNC: 102 U/L — SIGNIFICANT CHANGE UP (ref 40–120)
ALT FLD-CCNC: 39 U/L — SIGNIFICANT CHANGE UP (ref 12–42)
ANION GAP SERPL CALC-SCNC: 7 MMOL/L — LOW (ref 9–16)
APPEARANCE UR: CLEAR — SIGNIFICANT CHANGE UP
APTT BLD: 34.6 SEC — SIGNIFICANT CHANGE UP (ref 24.5–35.6)
AST SERPL-CCNC: 29 U/L — SIGNIFICANT CHANGE UP (ref 15–37)
BASOPHILS # BLD AUTO: 0.04 K/UL — SIGNIFICANT CHANGE UP (ref 0–0.2)
BASOPHILS NFR BLD AUTO: 1 % — SIGNIFICANT CHANGE UP (ref 0–2)
BILIRUB SERPL-MCNC: 0.5 MG/DL — SIGNIFICANT CHANGE UP (ref 0.2–1.2)
BILIRUB UR-MCNC: NEGATIVE — SIGNIFICANT CHANGE UP
BUN SERPL-MCNC: 21 MG/DL — SIGNIFICANT CHANGE UP (ref 7–23)
CALCIUM SERPL-MCNC: 9.1 MG/DL — SIGNIFICANT CHANGE UP (ref 8.5–10.5)
CHLORIDE SERPL-SCNC: 103 MMOL/L — SIGNIFICANT CHANGE UP (ref 96–108)
CO2 SERPL-SCNC: 27 MMOL/L — SIGNIFICANT CHANGE UP (ref 22–31)
COLOR SPEC: YELLOW — SIGNIFICANT CHANGE UP
CREAT SERPL-MCNC: 1.21 MG/DL — SIGNIFICANT CHANGE UP (ref 0.5–1.3)
DIFF PNL FLD: NEGATIVE — SIGNIFICANT CHANGE UP
EGFR: 71 ML/MIN/1.73M2 — SIGNIFICANT CHANGE UP
EOSINOPHIL # BLD AUTO: 0.06 K/UL — SIGNIFICANT CHANGE UP (ref 0–0.5)
EOSINOPHIL NFR BLD AUTO: 1.4 % — SIGNIFICANT CHANGE UP (ref 0–6)
GLUCOSE SERPL-MCNC: 128 MG/DL — HIGH (ref 70–99)
GLUCOSE UR QL: NEGATIVE MG/DL — SIGNIFICANT CHANGE UP
HCT VFR BLD CALC: 45.4 % — SIGNIFICANT CHANGE UP (ref 39–50)
HGB BLD-MCNC: 14.5 G/DL — SIGNIFICANT CHANGE UP (ref 13–17)
IMM GRANULOCYTES NFR BLD AUTO: 0.5 % — SIGNIFICANT CHANGE UP (ref 0–0.9)
INR BLD: 1.22 — HIGH (ref 0.85–1.16)
KETONES UR-MCNC: NEGATIVE MG/DL — SIGNIFICANT CHANGE UP
LEUKOCYTE ESTERASE UR-ACNC: NEGATIVE — SIGNIFICANT CHANGE UP
LYMPHOCYTES # BLD AUTO: 1.53 K/UL — SIGNIFICANT CHANGE UP (ref 1–3.3)
LYMPHOCYTES # BLD AUTO: 37 % — SIGNIFICANT CHANGE UP (ref 13–44)
MCHC RBC-ENTMCNC: 27.3 PG — SIGNIFICANT CHANGE UP (ref 27–34)
MCHC RBC-ENTMCNC: 31.9 G/DL — LOW (ref 32–36)
MCV RBC AUTO: 85.5 FL — SIGNIFICANT CHANGE UP (ref 80–100)
MONOCYTES # BLD AUTO: 0.5 K/UL — SIGNIFICANT CHANGE UP (ref 0–0.9)
MONOCYTES NFR BLD AUTO: 12.1 % — SIGNIFICANT CHANGE UP (ref 2–14)
NEUTROPHILS # BLD AUTO: 1.99 K/UL — SIGNIFICANT CHANGE UP (ref 1.8–7.4)
NEUTROPHILS NFR BLD AUTO: 48 % — SIGNIFICANT CHANGE UP (ref 43–77)
NITRITE UR-MCNC: NEGATIVE — SIGNIFICANT CHANGE UP
NRBC # BLD: 0 /100 WBCS — SIGNIFICANT CHANGE UP (ref 0–0)
PH UR: 7 — SIGNIFICANT CHANGE UP (ref 5–8)
PLATELET # BLD AUTO: 339 K/UL — SIGNIFICANT CHANGE UP (ref 150–400)
POTASSIUM SERPL-MCNC: 4.3 MMOL/L — SIGNIFICANT CHANGE UP (ref 3.5–5.3)
POTASSIUM SERPL-SCNC: 4.3 MMOL/L — SIGNIFICANT CHANGE UP (ref 3.5–5.3)
PROT SERPL-MCNC: 7.9 G/DL — SIGNIFICANT CHANGE UP (ref 6.4–8.2)
PROT UR-MCNC: NEGATIVE MG/DL — SIGNIFICANT CHANGE UP
PROTHROM AB SERPL-ACNC: 14.1 SEC — HIGH (ref 9.9–13.4)
RBC # BLD: 5.31 M/UL — SIGNIFICANT CHANGE UP (ref 4.2–5.8)
RBC # FLD: 12.8 % — SIGNIFICANT CHANGE UP (ref 10.3–14.5)
SODIUM SERPL-SCNC: 137 MMOL/L — SIGNIFICANT CHANGE UP (ref 132–145)
SP GR SPEC: 1.03 — SIGNIFICANT CHANGE UP (ref 1–1.03)
TROPONIN I, HIGH SENSITIVITY RESULT: 21.8 NG/L — SIGNIFICANT CHANGE UP
UROBILINOGEN FLD QL: 0.2 MG/DL — SIGNIFICANT CHANGE UP (ref 0.2–1)
WBC # BLD: 4.14 K/UL — SIGNIFICANT CHANGE UP (ref 3.8–10.5)
WBC # FLD AUTO: 4.14 K/UL — SIGNIFICANT CHANGE UP (ref 3.8–10.5)

## 2024-11-07 PROCEDURE — 70551 MRI BRAIN STEM W/O DYE: CPT | Mod: 26

## 2024-11-07 PROCEDURE — 70498 CT ANGIOGRAPHY NECK: CPT | Mod: 26,MC

## 2024-11-07 PROCEDURE — 0042T: CPT | Mod: MC

## 2024-11-07 PROCEDURE — 70496 CT ANGIOGRAPHY HEAD: CPT | Mod: 26,MC

## 2024-11-07 PROCEDURE — 99285 EMERGENCY DEPT VISIT HI MDM: CPT

## 2024-11-07 PROCEDURE — 70450 CT HEAD/BRAIN W/O DYE: CPT | Mod: 26,59,MC

## 2024-11-07 RX ORDER — INFLUENZ VIR VAC TV P-SURF2003 15MCG/.5ML
0.5 SYRINGE (ML) INTRAMUSCULAR ONCE
Refills: 0 | Status: DISCONTINUED | OUTPATIENT
Start: 2024-11-07 | End: 2024-11-08

## 2024-11-07 RX ORDER — CLOPIDOGREL 75 MG/1
300 TABLET ORAL ONCE
Refills: 0 | Status: COMPLETED | OUTPATIENT
Start: 2024-11-07 | End: 2024-11-07

## 2024-11-07 RX ORDER — METOCLOPRAMIDE HCL 10 MG
10 TABLET ORAL ONCE
Refills: 0 | Status: DISCONTINUED | OUTPATIENT
Start: 2024-11-07 | End: 2024-11-07

## 2024-11-07 RX ORDER — MYCOPHENOLATE MOFETIL 500 MG/1
2 TABLET, FILM COATED ORAL
Refills: 0 | DISCHARGE

## 2024-11-07 RX ORDER — MYCOPHENOLATE MOFETIL 500 MG/1
1500 TABLET, FILM COATED ORAL DAILY
Refills: 0 | Status: DISCONTINUED | OUTPATIENT
Start: 2024-11-08 | End: 2024-11-08

## 2024-11-07 RX ORDER — APIXABAN 5 MG/1
5 TABLET, FILM COATED ORAL EVERY 12 HOURS
Refills: 0 | Status: DISCONTINUED | OUTPATIENT
Start: 2024-11-08 | End: 2024-11-08

## 2024-11-07 RX ORDER — ASPIRIN/MAG CARB/ALUMINUM AMIN 325 MG
81 TABLET ORAL DAILY
Refills: 0 | Status: DISCONTINUED | OUTPATIENT
Start: 2024-11-08 | End: 2024-11-08

## 2024-11-07 RX ORDER — MYCOPHENOLATE MOFETIL 500 MG/1
1000 TABLET, FILM COATED ORAL AT BEDTIME
Refills: 0 | Status: DISCONTINUED | OUTPATIENT
Start: 2024-11-07 | End: 2024-11-08

## 2024-11-07 RX ORDER — ACETAMINOPHEN 500 MG
650 TABLET ORAL EVERY 6 HOURS
Refills: 0 | Status: DISCONTINUED | OUTPATIENT
Start: 2024-11-07 | End: 2024-11-08

## 2024-11-07 RX ORDER — MYCOPHENOLATE MOFETIL 500 MG/1
3 TABLET, FILM COATED ORAL
Refills: 0 | DISCHARGE

## 2024-11-07 RX ORDER — MYCOPHENOLATE MOFETIL 500 MG/1
2 TABLET ORAL
Refills: 0 | DISCHARGE

## 2024-11-07 RX ORDER — ASPIRIN/MAG CARB/ALUMINUM AMIN 325 MG
324 TABLET ORAL ONCE
Refills: 0 | Status: COMPLETED | OUTPATIENT
Start: 2024-11-07 | End: 2024-11-07

## 2024-11-07 RX ORDER — MYCOPHENOLATE MOFETIL 500 MG/1
3 TABLET ORAL
Refills: 0 | DISCHARGE

## 2024-11-07 RX ADMIN — Medication 650 MILLIGRAM(S): at 19:01

## 2024-11-07 RX ADMIN — MYCOPHENOLATE MOFETIL 1000 MILLIGRAM(S): 500 TABLET, FILM COATED ORAL at 21:06

## 2024-11-07 RX ADMIN — Medication 80 MILLIGRAM(S): at 15:42

## 2024-11-07 RX ADMIN — Medication 324 MILLIGRAM(S): at 15:40

## 2024-11-07 RX ADMIN — Medication 80 MILLIGRAM(S): at 21:06

## 2024-11-07 RX ADMIN — CLOPIDOGREL 300 MILLIGRAM(S): 75 TABLET ORAL at 15:40

## 2024-11-07 RX ADMIN — Medication 650 MILLIGRAM(S): at 19:31

## 2024-11-07 NOTE — ED PROVIDER NOTE - PHYSICAL EXAMINATION
VITAL SIGNS: I have reviewed nursing notes and confirm.  CONSTITUTIONAL: Well-developed; well-nourished; in no acute distress.  SKIN: Skin exam is warm and dry, no acute rash.  HEAD: Normocephalic; atraumatic.  EYES: PERRL, EOM intact; conjunctiva and sclera clear.  ENT: No nasal discharge; airway clear.  NECK: Supple; non tender.  CARD: RRR  RESP: Unlabored. No wheezes, rales or rhonchi.  ABD: soft; non-distended; non-tender  EXT: Normal ROM. No cyanosis or edema. Non-ttp all ext, distal pulses intact  LYMPH: No acute cervical adenopathy.  NEURO: Alert, oriented x3. + speech somewhat slurred, CN 2-12 intact b/l, 5/5 strength all ext, no sensory deficits, gait intact  PSYCH: Cooperative, appropriate.

## 2024-11-07 NOTE — PATIENT PROFILE ADULT - FALL HARM RISK - HARM RISK INTERVENTIONS

## 2024-11-07 NOTE — ED PROVIDER NOTE - OBJECTIVE STATEMENT
53 y/o male with paroxysmal atrial fibrillation on Eliquis, HADLEY not on CPAP, Rheumatoid Arthritis, Benign Essential Hypertension, Scleritis on Mycophenolate and atypical atrial flutter s/p ablation at [facility] on 10/23/24, p/w dysarthria/difficulty communicating starting at 9am this morning. Woke up at 6am feeling in his normal state of health. Sx are persistent. No focal numbness/tingling/weakness or visual deficits. No HA. Reports compliance with eliquis -- took it this morning. No CP/SOB/palpitations.

## 2024-11-07 NOTE — H&P ADULT - NSHPLABSRESULTS_GEN_ALL_CORE
Fingerstick Blood Glucose: CAPILLARY BLOOD GLUCOSE      POCT Blood Glucose.: 116 mg/dL (2024 13:03)    LABS:                        14.5   4.14  )-----------( 339      ( 2024 13:02 )             45.4         137  |  103  |  21  ----------------------------<  128[H]  4.3   |  27  |  1.21    Ca    9.1      2024 13:02    TPro  7.9  /  Alb  4.0  /  TBili  0.5  /  DBili  x   /  AST  29  /  ALT  39  /  AlkPhos  102      PT/INR - ( 2024 13:02 )   PT: 14.1 sec;   INR: 1.22          PTT - ( 2024 13:02 )  PTT:34.6 sec      Urinalysis Basic - ( 2024 14:43 )    Color: Yellow / Appearance: Clear / S.027 / pH: x  Gluc: x / Ketone: Negative mg/dL  / Bili: Negative / Urobili: 0.2 mg/dL   Blood: x / Protein: Negative mg/dL / Nitrite: Negative   Leuk Esterase: Negative / RBC: x / WBC x   Sq Epi: x / Non Sq Epi: x / Bacteria: x        RADIOLOGY & ADDITIONAL STUDIES:    < from: CT Brain Stroke Protocol (24 @ 13:06) >      IMPRESSION:    CT of the brain:  No acute intracranial injury  The results were discussed with ROBSON Smith at 1:15 PM on 2024. This   study was performed on 2024 at 11: 1:05 PM    CT perfusion:    Tmax greater than 6 seconds: 77 mL.  Mismatch volume: 77 mL.  Mismatch ratio: Infinite      CTA of the intracranial circulation.  Severe narrowing/occlusion of the left M2 inferior division of the middle   cerebral artery  .  No evidence of aneurysm.  Dr. Barreto was informed of the results.    CTA of the extracranial circulation.  No evidence of carotid stenosis.    < end of copied text >        -----------------------------------------------------------------------------------------

## 2024-11-07 NOTE — H&P ADULT - NSHPPHYSICALEXAM_GEN_ALL_CORE
Physical exam:  General: No acute distress, awake and alert  Cardiovascular: Regular rate and rhythm, no murmurs, rubs, or gallops. No bruits  Pulmonary: Anterior breath sounds clear bilaterally, no crackles or wheezing. No use of accessory muscles  GI: Abdomen soft, non-tender, non-distended    Neurologic:  -Mental status: Awake, alert, oriented to person, place, and time. Speech is fluent with intact naming, repetition, and comprehension, no dysarthria. Recent and remote memory intact. Follows commands. Attention/concentration intact. Fund of knowledge appropriate.  -Cranial nerves:   II: Visual fields are full to confrontation.  III, IV, VI: Extraocular movements are intact without nystagmus. Pupils equally round and reactive to light  V:  Facial sensation V1-V3 equal and intact   VII: Face is symmetric with normal eye closure and smile  VIII: Hearing is bilaterally intact to finger rub  IX, X: Uvula is midline and soft palate rises symmetrically  XI: Head turning and shoulder shrug are intact.  XII: Tongue protrudes midline  Motor: Normal bulk and tone. No pronator drift. Strength bilateral upper extremity 5/5, bilateral lower extremities 5/5.  Rapid alternating movements intact and symmetric  Sensation: Intact to light touch bilaterally. No neglect or extinction on double simultaneous testing.  Coordination: No dysmetria on finger-to-nose and heel-to-shin bilaterally  Reflexes: Downgoing toes bilaterally   Gait: Narrow gait and steady    NIHSS: **** ASPECT Score: *** ICH Score: *** (GCS) Physical exam:  General: No acute distress, awake and alert    Neurologic:  -Mental status: Awake, alert, oriented to person, place, and time. Has minimal paraphrasic errors but appears to have some component of expressive aphasia; able to name pen, watch, phone, backpack. Follows midline and cross commands. Attention/concentration intact. Fund of knowledge appropriate.  -Cranial nerves:   II: Visual fields are full to confrontation.  III, IV, VI: Extraocular movements are intact without nystagmus. Pupils equally round and reactive to light  V:  Facial sensation V1-V3 equal and intact   VII: Face is symmetric with normal eye closure and smile  Motor: Normal bulk and tone. No pronator drift. Strength bilateral upper extremity 5/5, bilateral lower extremities 5/5.  Sensation: Intact to light touch bilaterally. No neglect or extinction on double simultaneous testing.  Coordination: No dysmetria on finger-to-nose and heel-to-shin bilaterally    NIHSS: 1

## 2024-11-07 NOTE — ED ADULT NURSE NOTE - OBJECTIVE STATEMENT
Code Stroke Activated in Triage. Together with Angelo SALAZAR, IV access and labs obtained in CT. Patient reports that his tongue feels heavy and that he mentation is slower than normal. Patient reports no food today yet had 1.5 cups of coffee. Patient noticed heavy tongue sensation and slower mentation around 0900. CBG within normal limits. Patient reports headache, denies N/V, SOB and CP. Initial NIH performed by RN equates score of zero. Patient passed dysphagia screen. Patient noted to ambulate with steady gait.

## 2024-11-07 NOTE — H&P ADULT - ASSESSMENT
54y Male with PMHx of Afib s/p ablation (on Eliquis and compliant), HADLEY, RA, HTN, and scleritis, presenting to [facility] with acute onset difficulty speaking between 0930 and 1000 this AM. Patient reports that he woke up this morning in his normal state when, suddenly, at 0930/1000 he felt dizzy, had difficulty understanding people, and was very slow to communicate. On arrival to [facility], stroke code called. NCHCT performed and negative for any abnormalities, CT perfusion with 77cc mismatch in L MCA territory and cerebellum, CTA head and neck with severe narrowing/occlusion of the left M2 inferior division of the MCA. Patient was loaded with DAPT and transferred to [facility] for further work up and management.     Neuro  #CVA workup  - continue aspirin 81mg and Eliquis 5mg BID tomorrow  - continue atorvastatin 80mg daily  - q4hr stroke neuro checks and vitals  - obtain MRI Brain without contrast  - Stroke Code HCT Results: negative  - Stroke Code CTA Results: severe narrowing/occlusion of the left M2 inferior division of the MCA  - Stroke education    Cards  #HTN  - permissive hypertension, Goal -180  - hold home blood pressure medication for now  - obtain TTE and JOHN    #HLD  - high dose statin as above in CVA  - LDL results: pending    Pulm  - call provider if SPO2 < 94%    GI  #Nutrition/Fluids/Electrolytes   - replete K<4 and Mg <2  - Diet: DASH    Renal  - daily BMP       Endocrine  - A1C results: pending      - TSH results: pending    DVT Prophylaxis  - Eliquis  - SCDs for DVT prophylaxis     Dispo: pending PT/OT     Discussed daily hospital plans and goals with patient    Discussed with Neurology Attending Dr. Brar

## 2024-11-07 NOTE — ED PROVIDER NOTE - CLINICAL SUMMARY MEDICAL DECISION MAKING FREE TEXT BOX
+ acute dysarthria concerning for CVA, no lateralizing deficits, + some narrowing of L MCA vessels. c/w Stroke attending Dr. Brar, loaded with antiplatlet therapy, admitting to stroke team.

## 2024-11-07 NOTE — H&P ADULT - HISTORY OF PRESENT ILLNESS
**STROKE HPI***    HPI: 54y Male with PMHx of     PAST MEDICAL & SURGICAL HISTORY:  Rheumatoid arthritis      Atrial fibrillation and flutter      Paroxysmal atrial fibrillation      Scleritis      HTN (hypertension)      Benign essential hypertension      HADLEY (obstructive sleep apnea)      Childhood asthma      H/O cardiac radiofrequency ablation      H/O cardiac radiofrequency ablation          FAMILY HISTORY:  FH: heart disease (Father)    FH: stroke (Mother)      T(C): 36.6 (11-07-24 @ 17:02), Max: 37.5 (11-07-24 @ 13:01)  HR: 77 (11-07-24 @ 17:02) (77 - 84)  BP: 147/89 (11-07-24 @ 17:02) (131/58 - 161/101)  RR: 18 (11-07-24 @ 17:02) (16 - 18)  SpO2: 99% (11-07-24 @ 17:02) (96% - 99%)    MEDICATION RECONCILIATION   MEDICATIONS  (STANDING):  metoclopramide Injectable 10 milliGRAM(s) IV Push once    MEDICATIONS  (PRN):    Allergies    losartan (Other)    Intolerances      Vital Signs Last 24 Hrs  T(C): 36.6 (07 Nov 2024 17:02), Max: 37.5 (07 Nov 2024 13:01)  T(F): 97.9 (07 Nov 2024 17:02), Max: 99.5 (07 Nov 2024 13:01)  HR: 77 (07 Nov 2024 17:02) (77 - 84)  BP: 147/89 (07 Nov 2024 17:02) (131/58 - 161/101)  BP(mean): --  RR: 18 (07 Nov 2024 17:02) (16 - 18)  SpO2: 99% (07 Nov 2024 17:02) (96% - 99%)    Parameters below as of 07 Nov 2024 17:02  Patient On (Oxygen Delivery Method): room air      **STROKE HPI***    HPI: 54y Male with PMHx of Afib s/p ablation (on Eliquis and compliant), HADLEY, RA, HTN, and scleritis, presenting to [facility] with acute onset difficulty speaking between 0930 and 1000 this AM. Patient reports that he woke up this morning in his normal state when, suddenly, at 0930/1000 he felt dizzy, had difficulty understanding people, and was very slow to communicate. On arrival to [facility], stroke code called. NCHCT performed and negative for any abnormalities, CT perfusion with 77cc mismatch in L MCA territory and cerebellum, CTA head and neck with severe narrowing/occlusion of the left M2 inferior division of the MCA. Patient was loaded with DAPT and transferred to [facility] for further work up and management.     PAST MEDICAL & SURGICAL HISTORY:  Rheumatoid arthritis      Atrial fibrillation and flutter      Paroxysmal atrial fibrillation      Scleritis      HTN (hypertension)      Benign essential hypertension      HADLEY (obstructive sleep apnea)      Childhood asthma      H/O cardiac radiofrequency ablation      H/O cardiac radiofrequency ablation          FAMILY HISTORY:  FH: heart disease (Father)    FH: stroke (Mother)      T(C): 36.6 (11-07-24 @ 17:02), Max: 37.5 (11-07-24 @ 13:01)  HR: 77 (11-07-24 @ 17:02) (77 - 84)  BP: 147/89 (11-07-24 @ 17:02) (131/58 - 161/101)  RR: 18 (11-07-24 @ 17:02) (16 - 18)  SpO2: 99% (11-07-24 @ 17:02) (96% - 99%)    MEDICATION RECONCILIATION   MEDICATIONS  (STANDING):  metoclopramide Injectable 10 milliGRAM(s) IV Push once    MEDICATIONS  (PRN):    Allergies    losartan (Other)    Intolerances      Vital Signs Last 24 Hrs  T(C): 36.6 (07 Nov 2024 17:02), Max: 37.5 (07 Nov 2024 13:01)  T(F): 97.9 (07 Nov 2024 17:02), Max: 99.5 (07 Nov 2024 13:01)  HR: 77 (07 Nov 2024 17:02) (77 - 84)  BP: 147/89 (07 Nov 2024 17:02) (131/58 - 161/101)  BP(mean): --  RR: 18 (07 Nov 2024 17:02) (16 - 18)  SpO2: 99% (07 Nov 2024 17:02) (96% - 99%)    Parameters below as of 07 Nov 2024 17:02  Patient On (Oxygen Delivery Method): room air

## 2024-11-07 NOTE — ED ADULT NURSE NOTE - CHIEF COMPLAINT QUOTE
pt c/o sudden difficulty gathering words, headache and a "heavy tongue" feeling that all began at 9am, provider called to triage for evaluation, stroke code called. Hx Afib.

## 2024-11-07 NOTE — H&P ADULT - NSHPREVIEWOFSYSTEMS_GEN_ALL_CORE
ROS: ***  Constitutional: No fever, weight loss or fatigue  Eyes: No eye pain, visual disturbances, or discharge  ENMT:  No difficulty hearing, tinnitus, vertigo; No sinus or throat pain  Neck: No pain or stiffness  Respiratory: No cough, wheezing, chills or hemoptysis  Cardiovascular: No chest pain, palpitations, shortness of breath, dizziness or leg swelling  Gastrointestinal: No abdominal pain. No nausea, vomiting or hematemesis; No diarrhea or constipation. Nohematochezia.  Genitourinary: No dysuria, frequency, hematuria or incontinence  Neurological: As per HPI  Skin: No itching, burning, rashes or lesions   Endocrine: No heat or cold intolerance; No hair loss  Musculoskeletal: No joint pain or swelling; No muscle, back or extremity pain  Psychiatric: No depression, anxiety, mood swings or difficulty sleeping  Heme/Lymph: No easy bruising or bleeding gums ROS:   Constitutional: No fever, weight loss or fatigue  Eyes: No eye pain, visual disturbances, or discharge  ENMT:  No difficulty hearing, tinnitus, vertigo; No sinus or throat pain  Neck: No pain or stiffness  Respiratory: No cough, wheezing, chills or hemoptysis  Cardiovascular: No chest pain, palpitations, shortness of breath, dizziness or leg swelling  Gastrointestinal: No abdominal pain. No nausea, vomiting or hematemesis; No diarrhea or constipation. Nohematochezia.  Genitourinary: No dysuria, frequency, hematuria or incontinence  Neurological: As per HPI  Skin: No itching, burning, rashes or lesions   Endocrine: No heat or cold intolerance; No hair loss  Musculoskeletal: No joint pain or swelling; No muscle, back or extremity pain  Psychiatric: No depression, anxiety, mood swings or difficulty sleeping  Heme/Lymph: No easy bruising or bleeding gums

## 2024-11-07 NOTE — ED ADULT NURSE NOTE - BOWEL SOUNDS RUQ
present Complex Repair And Dermal Autograft Text: The defect edges were debeveled with a #15 scalpel blade.  The primary defect was closed partially with a complex linear closure.  Given the location of the defect, shape of the defect and the proximity to free margins an dermal autograft was deemed most appropriate to repair the remaining defect.  The graft was trimmed to fit the size of the remaining defect.  The graft was then placed in the primary defect, oriented appropriately, and sutured into place.

## 2024-11-08 ENCOUNTER — NON-APPOINTMENT (OUTPATIENT)
Age: 54
End: 2024-11-08

## 2024-11-08 ENCOUNTER — TRANSCRIPTION ENCOUNTER (OUTPATIENT)
Age: 54
End: 2024-11-08

## 2024-11-08 ENCOUNTER — RESULT REVIEW (OUTPATIENT)
Age: 54
End: 2024-11-08

## 2024-11-08 VITALS — TEMPERATURE: 98 F

## 2024-11-08 DIAGNOSIS — H15.009 UNSPECIFIED SCLERITIS, UNSPECIFIED EYE: ICD-10-CM

## 2024-11-08 DIAGNOSIS — I63.9 CEREBRAL INFARCTION, UNSPECIFIED: ICD-10-CM

## 2024-11-08 DIAGNOSIS — I48.92 UNSPECIFIED ATRIAL FLUTTER: ICD-10-CM

## 2024-11-08 DIAGNOSIS — I10 ESSENTIAL (PRIMARY) HYPERTENSION: ICD-10-CM

## 2024-11-08 DIAGNOSIS — G47.33 OBSTRUCTIVE SLEEP APNEA (ADULT) (PEDIATRIC): ICD-10-CM

## 2024-11-08 PROBLEM — I48.0 PAROXYSMAL ATRIAL FIBRILLATION: Chronic | Status: ACTIVE | Noted: 2024-10-17

## 2024-11-08 LAB
A1C WITH ESTIMATED AVERAGE GLUCOSE RESULT: 5.6 % — SIGNIFICANT CHANGE UP (ref 4–5.6)
ANION GAP SERPL CALC-SCNC: 11 MMOL/L — SIGNIFICANT CHANGE UP (ref 5–17)
BUN SERPL-MCNC: 19 MG/DL — SIGNIFICANT CHANGE UP (ref 7–23)
CALCIUM SERPL-MCNC: 9 MG/DL — SIGNIFICANT CHANGE UP (ref 8.4–10.5)
CHLORIDE SERPL-SCNC: 104 MMOL/L — SIGNIFICANT CHANGE UP (ref 96–108)
CHOLEST SERPL-MCNC: 144 MG/DL — SIGNIFICANT CHANGE UP
CO2 SERPL-SCNC: 24 MMOL/L — SIGNIFICANT CHANGE UP (ref 22–31)
CREAT SERPL-MCNC: 1.14 MG/DL — SIGNIFICANT CHANGE UP (ref 0.5–1.3)
EGFR: 76 ML/MIN/1.73M2 — SIGNIFICANT CHANGE UP
ESTIMATED AVERAGE GLUCOSE: 114 MG/DL — SIGNIFICANT CHANGE UP (ref 68–114)
GLUCOSE SERPL-MCNC: 94 MG/DL — SIGNIFICANT CHANGE UP (ref 70–99)
HCT VFR BLD CALC: 45 % — SIGNIFICANT CHANGE UP (ref 39–50)
HDLC SERPL-MCNC: 37 MG/DL — LOW
HGB BLD-MCNC: 14.5 G/DL — SIGNIFICANT CHANGE UP (ref 13–17)
LIPID PNL WITH DIRECT LDL SERPL: 90 MG/DL — SIGNIFICANT CHANGE UP
MAGNESIUM SERPL-MCNC: 2.2 MG/DL — SIGNIFICANT CHANGE UP (ref 1.6–2.6)
MCHC RBC-ENTMCNC: 27.6 PG — SIGNIFICANT CHANGE UP (ref 27–34)
MCHC RBC-ENTMCNC: 32.2 G/DL — SIGNIFICANT CHANGE UP (ref 32–36)
MCV RBC AUTO: 85.7 FL — SIGNIFICANT CHANGE UP (ref 80–100)
NON HDL CHOLESTEROL: 107 MG/DL — SIGNIFICANT CHANGE UP
NRBC # BLD: 0 /100 WBCS — SIGNIFICANT CHANGE UP (ref 0–0)
PHOSPHATE SERPL-MCNC: 4 MG/DL — SIGNIFICANT CHANGE UP (ref 2.5–4.5)
PLATELET # BLD AUTO: 305 K/UL — SIGNIFICANT CHANGE UP (ref 150–400)
POTASSIUM SERPL-MCNC: 4.2 MMOL/L — SIGNIFICANT CHANGE UP (ref 3.5–5.3)
POTASSIUM SERPL-SCNC: 4.2 MMOL/L — SIGNIFICANT CHANGE UP (ref 3.5–5.3)
RBC # BLD: 5.25 M/UL — SIGNIFICANT CHANGE UP (ref 4.2–5.8)
RBC # FLD: 12.8 % — SIGNIFICANT CHANGE UP (ref 10.3–14.5)
SODIUM SERPL-SCNC: 139 MMOL/L — SIGNIFICANT CHANGE UP (ref 135–145)
TRIGL SERPL-MCNC: 88 MG/DL — SIGNIFICANT CHANGE UP
TSH SERPL-MCNC: 1.75 UIU/ML — SIGNIFICANT CHANGE UP (ref 0.27–4.2)
WBC # BLD: 3.85 K/UL — SIGNIFICANT CHANGE UP (ref 3.8–10.5)
WBC # FLD AUTO: 3.85 K/UL — SIGNIFICANT CHANGE UP (ref 3.8–10.5)

## 2024-11-08 PROCEDURE — 81003 URINALYSIS AUTO W/O SCOPE: CPT

## 2024-11-08 PROCEDURE — 70498 CT ANGIOGRAPHY NECK: CPT | Mod: MC

## 2024-11-08 PROCEDURE — 85610 PROTHROMBIN TIME: CPT

## 2024-11-08 PROCEDURE — 70496 CT ANGIOGRAPHY HEAD: CPT | Mod: MC

## 2024-11-08 PROCEDURE — 86146 BETA-2 GLYCOPROTEIN ANTIBODY: CPT

## 2024-11-08 PROCEDURE — 82962 GLUCOSE BLOOD TEST: CPT

## 2024-11-08 PROCEDURE — 85300 ANTITHROMBIN III ACTIVITY: CPT

## 2024-11-08 PROCEDURE — 0042T: CPT | Mod: MC

## 2024-11-08 PROCEDURE — 93306 TTE W/DOPPLER COMPLETE: CPT | Mod: 26

## 2024-11-08 PROCEDURE — 99233 SBSQ HOSP IP/OBS HIGH 50: CPT

## 2024-11-08 PROCEDURE — 83036 HEMOGLOBIN GLYCOSYLATED A1C: CPT

## 2024-11-08 PROCEDURE — 97165 OT EVAL LOW COMPLEX 30 MIN: CPT

## 2024-11-08 PROCEDURE — 84484 ASSAY OF TROPONIN QUANT: CPT

## 2024-11-08 PROCEDURE — 93970 EXTREMITY STUDY: CPT

## 2024-11-08 PROCEDURE — 85303 CLOT INHIBIT PROT C ACTIVITY: CPT

## 2024-11-08 PROCEDURE — 84443 ASSAY THYROID STIM HORMONE: CPT

## 2024-11-08 PROCEDURE — 99285 EMERGENCY DEPT VISIT HI MDM: CPT

## 2024-11-08 PROCEDURE — 83090 ASSAY OF HOMOCYSTEINE: CPT

## 2024-11-08 PROCEDURE — 85307 ASSAY ACTIVATED PROTEIN C: CPT

## 2024-11-08 PROCEDURE — 70450 CT HEAD/BRAIN W/O DYE: CPT | Mod: MC

## 2024-11-08 PROCEDURE — 80048 BASIC METABOLIC PNL TOTAL CA: CPT

## 2024-11-08 PROCEDURE — 80061 LIPID PANEL: CPT

## 2024-11-08 PROCEDURE — 85027 COMPLETE CBC AUTOMATED: CPT

## 2024-11-08 PROCEDURE — 36415 COLL VENOUS BLD VENIPUNCTURE: CPT

## 2024-11-08 PROCEDURE — 99222 1ST HOSP IP/OBS MODERATE 55: CPT | Mod: GC

## 2024-11-08 PROCEDURE — 70551 MRI BRAIN STEM W/O DYE: CPT | Mod: MC

## 2024-11-08 PROCEDURE — 84100 ASSAY OF PHOSPHORUS: CPT

## 2024-11-08 PROCEDURE — G0452: CPT | Mod: 26

## 2024-11-08 PROCEDURE — 80053 COMPREHEN METABOLIC PANEL: CPT

## 2024-11-08 PROCEDURE — 93306 TTE W/DOPPLER COMPLETE: CPT

## 2024-11-08 PROCEDURE — 93970 EXTREMITY STUDY: CPT | Mod: 26

## 2024-11-08 PROCEDURE — 81241 F5 GENE: CPT

## 2024-11-08 PROCEDURE — 99223 1ST HOSP IP/OBS HIGH 75: CPT

## 2024-11-08 PROCEDURE — 85306 CLOT INHIBIT PROT S FREE: CPT

## 2024-11-08 PROCEDURE — 81240 F2 GENE: CPT

## 2024-11-08 PROCEDURE — 83735 ASSAY OF MAGNESIUM: CPT

## 2024-11-08 PROCEDURE — 85025 COMPLETE CBC W/AUTO DIFF WBC: CPT

## 2024-11-08 PROCEDURE — 86147 CARDIOLIPIN ANTIBODY EA IG: CPT

## 2024-11-08 PROCEDURE — 85730 THROMBOPLASTIN TIME PARTIAL: CPT

## 2024-11-08 RX ORDER — ASPIRIN/MAG CARB/ALUMINUM AMIN 325 MG
1 TABLET ORAL
Qty: 30 | Refills: 0
Start: 2024-11-08

## 2024-11-08 RX ORDER — ACETAMINOPHEN 500 MG
2 TABLET ORAL
Qty: 0 | Refills: 0 | DISCHARGE
Start: 2024-11-08

## 2024-11-08 RX ORDER — PANTOPRAZOLE SODIUM 40 MG/1
1 TABLET, DELAYED RELEASE ORAL
Qty: 30 | Refills: 0
Start: 2024-11-08

## 2024-11-08 RX ORDER — BUTALBITAL, ACETAMINOPHEN AND CAFFEINE 50; 325; 40 MG/1; MG/1; MG/1
1 CAPSULE ORAL ONCE
Refills: 0 | Status: DISCONTINUED | OUTPATIENT
Start: 2024-11-08 | End: 2024-11-08

## 2024-11-08 RX ADMIN — Medication 650 MILLIGRAM(S): at 03:46

## 2024-11-08 RX ADMIN — APIXABAN 5 MILLIGRAM(S): 5 TABLET, FILM COATED ORAL at 06:40

## 2024-11-08 RX ADMIN — Medication 650 MILLIGRAM(S): at 03:01

## 2024-11-08 RX ADMIN — Medication 650 MILLIGRAM(S): at 10:02

## 2024-11-08 RX ADMIN — APIXABAN 5 MILLIGRAM(S): 5 TABLET, FILM COATED ORAL at 17:59

## 2024-11-08 RX ADMIN — Medication 81 MILLIGRAM(S): at 12:41

## 2024-11-08 RX ADMIN — Medication 6 MILLIGRAM(S): at 06:40

## 2024-11-08 RX ADMIN — Medication 650 MILLIGRAM(S): at 09:02

## 2024-11-08 RX ADMIN — MYCOPHENOLATE MOFETIL 1500 MILLIGRAM(S): 500 TABLET, FILM COATED ORAL at 12:41

## 2024-11-08 NOTE — DISCHARGE NOTE PROVIDER - CARE PROVIDER_API CALL
Dinora Camargo  NP in Family Health  [address] [address]  Phone: [phone]  Fax: [phone]  Scheduled Appointment: 02/10/2025 09:00 AM    Jacky Eid  Interventional Cardiology  [address]  [place] [address]  Phone: [phone]  Fax: [phone]  Follow Up Time: 2 weeks    Chayo Tejada  Cardiovascular Disease  [address], [address]  [place] [address]  Phone: [phone]  Fax: [phone]  Follow Up Time:    Dinora Camargo  NP in Family Health  [address] [address]  Phone: [phone]  Fax: [phone]  Scheduled Appointment: 11/21/2024 01:00 PM    Jacky Eid  Interventional Cardiology  [address]  [place] [address]  Phone: [phone]  Fax: [phone]  Follow Up Time: 2 weeks    Chayo Tejada  Cardiovascular Disease  [address], [address]  [place] [address]  Phone: [phone]  Fax: [phone]  Follow Up Time:    Jacky Eid  Interventional Cardiology  [address], [address]  [place] [address]  Phone: [phone]  Fax: [phone]  Follow Up Time: 2 weeks    Chayo Tejada  Cardiovascular Disease  [address], [address]  [place] [address]  Phone: [phone]  Fax: [phone]  Follow Up Time:     Anna Alexandre  NP in Family Health  [address]  [place] [address]  Phone: [phone]  Fax: [phone]  Scheduled Appointment: 11/12/2024 10:00 AM

## 2024-11-08 NOTE — DISCHARGE NOTE PROVIDER - NSDCFUSCHEDAPPT_GEN_ALL_CORE_FT
Goldberg, Naomi  [facility] Physician [facility]  OPHTHALM [address]  Scheduled Appointment: 12/03/2024    Nori Waldrop  [facility]  ELECTROPH [id number]  Scheduled Appointment: 12/23/2024     [facility]  NEUROLOGY [address]  Scheduled Appointment: 11/21/2024    [facility]  NEUROLOGY [address]  Scheduled Appointment: 11/21/2024    [facility]  NEUROLOGY [address]  Scheduled Appointment: 11/21/2024    Goldberg, Naomi  [facility]  OPHTHALM [address]  Scheduled Appointment: 12/03/2024    Nori Waldrop  [facility]  E[address]ROPH [address]  Scheduled Appointment: 12/23/2024     Anna Alexandre  [facility]  NEUROLOGY [address]  Scheduled Appointment: 11/12/2024    [facility]  NEUROLOGY [address]  Scheduled Appointment: 11/21/2024    [facility]  NEUROLOGY [address]  Scheduled Appointment: 11/21/2024    Goldberg, Naomi  [facility]  OPHTHALM [address]  Scheduled Appointment: 12/03/2024    Nori Waldrop  [facility]  ELECTROPH [address]  Scheduled Appointment: 12/23/2024

## 2024-11-08 NOTE — PROGRESS NOTE ADULT - SUBJECTIVE AND OBJECTIVE BOX
**INCOMPLETE NOTE    INTERVAL/OVERNIGHT EVENTS: None    SUBJECTIVE:  Patient seen and examined at bedside, comfortable, NAD. Denied fever, chest pain, dyspnea, abdominal pain.     Vital Signs Last 12 Hrs  T(F): 97.9 (24 @ 05:11), Max: 97.9 (24 @ 05:11)  HR: 73 (24 @ 05:10) (73 - 85)  BP: 131/86 (24 @ 05:10) (120/69 - 134/77)  BP(mean): 102 (24 @ 05:10) (86 - 102)  RR: 16 (24 @ 05:10) (16 - 16)  SpO2: 98% (24 @ 05:10) (95% - 98%)  I&O's Summary      PHYSICAL EXAM:  General: NAD  HEENT: PERRL, EOM intact, sclera anicteric, MMM  Cardiovascular: RRR; no MRG; no JVD  Respiratory: CTAB; no WRR  GI/: soft; NTND; BS x4  Extremities: WWP; 2+ peripheral pulses bilaterally; no LE edema  Skin: normal color & turgor; no rash  Neurologic: aox3; no focal deficits    LABS:                        14.5   4.14  )-----------( 339      ( 2024 13:02 )             45.4     11-    137  |  103  |  21  ----------------------------<  128[H]  4.3   |  27  |  1.21    Ca    9.1      2024 13:02    TPro  7.9  /  Alb  4.0  /  TBili  0.5  /  DBili  x   /  AST  29  /  ALT  39  /  AlkPhos  102  11-07    PT/INR - ( 2024 13:02 )   PT: 14.1 sec;   INR: 1.22          PTT - ( 2024 13:02 )  PTT:34.6 sec  Urinalysis Basic - ( 2024 14:43 )    Color: Yellow / Appearance: Clear / S.027 / pH: x  Gluc: x / Ketone: Negative mg/dL  / Bili: Negative / Urobili: 0.2 mg/dL   Blood: x / Protein: Negative mg/dL / Nitrite: Negative   Leuk Esterase: Negative / RBC: x / WBC x   Sq Epi: x / Non Sq Epi: x / Bacteria: x          RADIOLOGY & ADDITIONAL TESTS:    MEDICATIONS  (STANDING):  apixaban 5 milliGRAM(s) Oral every 12 hours  aspirin enteric coated 81 milliGRAM(s) Oral daily  atorvastatin 80 milliGRAM(s) Oral at bedtime  influenza   Vaccine 0.5 milliLiter(s) IntraMuscular once  mycophenolate mofetil 1000 milliGRAM(s) Oral at bedtime  mycophenolate mofetil 1500 milliGRAM(s) Oral daily  predniSONE   Tablet 6 milliGRAM(s) Oral daily    MEDICATIONS  (PRN):  acetaminophen     Tablet .. 650 milliGRAM(s) Oral every 6 hours PRN Mild Pain (1 - 3)   INTERVAL/OVERNIGHT EVENTS: None    SUBJECTIVE:  Patient seen and examined at bedside, comfortable, NAD. Denied fever, chest pain, dyspnea, abdominal pain.     Vital Signs Last 12 Hrs  T(F): 97.9 (24 @ 05:11), Max: 97.9 (24 @ 05:11)  HR: 73 (24 @ 05:10) (73 - 85)  BP: 131/86 (24 @ 05:10) (120/69 - 134/77)  BP(mean): 102 (24 @ 05:10) (86 - 102)  RR: 16 (24 @ 05:10) (16 - 16)  SpO2: 98% (24 @ 05:10) (95% - 98%)  I&O's Summary      PHYSICAL EXAM:  General: NAD  HEENT: PERRL, EOM intact, sclera anicteric, MMM  Cardiovascular: RRR; no MRG; no JVD  Respiratory: CTAB; no WRR  GI/: soft; NTND; BS x4  Extremities: WWP; 2+ peripheral pulses bilaterally; no LE edema  Skin: normal color & turgor; no rash  Neurologic: aox3; no focal deficits    LABS:                        14.5   4.14  )-----------( 339      ( 2024 13:02 )             45.4     11-    137  |  103  |  21  ----------------------------<  128[H]  4.3   |  27  |  1.21    Ca    9.1      2024 13:02    TPro  7.9  /  Alb  4.0  /  TBili  0.5  /  DBili  x   /  AST  29  /  ALT  39  /  AlkPhos  102  11-07    PT/INR - ( 2024 13:02 )   PT: 14.1 sec;   INR: 1.22          PTT - ( 2024 13:02 )  PTT:34.6 sec  Urinalysis Basic - ( 2024 14:43 )    Color: Yellow / Appearance: Clear / S.027 / pH: x  Gluc: x / Ketone: Negative mg/dL  / Bili: Negative / Urobili: 0.2 mg/dL   Blood: x / Protein: Negative mg/dL / Nitrite: Negative   Leuk Esterase: Negative / RBC: x / WBC x   Sq Epi: x / Non Sq Epi: x / Bacteria: x          RADIOLOGY & ADDITIONAL TESTS:    MEDICATIONS  (STANDING):  apixaban 5 milliGRAM(s) Oral every 12 hours  aspirin enteric coated 81 milliGRAM(s) Oral daily  atorvastatin 80 milliGRAM(s) Oral at bedtime  influenza   Vaccine 0.5 milliLiter(s) IntraMuscular once  mycophenolate mofetil 1000 milliGRAM(s) Oral at bedtime  mycophenolate mofetil 1500 milliGRAM(s) Oral daily  predniSONE   Tablet 6 milliGRAM(s) Oral daily    MEDICATIONS  (PRN):  acetaminophen     Tablet .. 650 milliGRAM(s) Oral every 6 hours PRN Mild Pain (1 - 3)

## 2024-11-08 NOTE — CONSULT NOTE ADULT - PROBLEM SELECTOR PROBLEM 5
UDS appropriate for Lyrica.  Was negative for hydrocodone and amitriptyline.  He had mentioned that he was not always taking the hydrocodone because of GI upset that he was getting with medication, though had not told me during office visit last week that he had not been taking it.  Last time amitriptyline was ordered was September last year with 1 refill.  So there is no way that he has been taking this medication appropriately since he should have been out 8 months ago if that was the case.  One of the only reasons that I had agreed to restart hydrocodone was because he was doing everything else that I had been asking him to do in regards to treatment of his chronic pain.  If he has not been taking this medication, then he is no longer compliant in regards to treatment and I do not feel comfortable continuing to prescribe the hydrocodone if he is not being compliant in regards to treatment otherwise.   Obstructive sleep apnea

## 2024-11-08 NOTE — DISCHARGE NOTE NURSING/CASE MANAGEMENT/SOCIAL WORK - NSDCPEFALRISK_GEN_ALL_CORE
For information on Fall & Injury Prevention, visit: [email or web address] OR  [email or web address] OR  https://www.cdc.gov/steadi/patient.html

## 2024-11-08 NOTE — OCCUPATIONAL THERAPY INITIAL EVALUATION ADULT - MD ORDER
Dizziness, difficulty w/ language and comprehension  S/P Stroke Code  CTA w/ severe narrowing/occlusion of the left M2 inferior division of the MCA  Brain MRI - Acute/subacute Left sided posterior insular cortex infarct w/o hemorrhage  Pending CVA w/u

## 2024-11-08 NOTE — OCCUPATIONAL THERAPY INITIAL EVALUATION ADULT - ADDITIONAL COMMENTS
Pt states that he lives w/ his wife in private house w x2 stairs to enter. Pt states that he was independent prior to admission, w/o prior use of AEs/DMEs.

## 2024-11-08 NOTE — DISCHARGE NOTE PROVIDER - NSDCCPCAREPLAN_GEN_ALL_CORE_FT
PRINCIPAL DISCHARGE DIAGNOSIS  Diagnosis: Stroke  Assessment and Plan of Treatment: During this hospital admission, you had an ischemic stroke. During an ischemic stroke, blood stops flowing to part of your brain because of a blockage in the blood vessel. This can damage areas in the brain that control other parts of the body.  Please take your Eliquis and Aspirin for blood thinning and Atorvastatin for cholesterol medication/blood vessel protection as prescribed to prevent further strokes. Do not skip doses and do not run low on your medication. If you run low on your medication, please contact your doctor. You will follow up outpatient with a member of the stroke team.   Doing your regular tasks may be difficult after you've had a stroke, but you can learn new ways to manage your daily activities. In fact, doing daily activities may help you to regain muscle strength. Be patient, give yourself time to adjust, and appreciate the progress you make. For example, when showering or bathing, test the water temperature with a hand or foot that was not affected by the stroke, use grab bars, a shower seat, a hand-held showerhead, etc. It is normal to feel fatigue after a stroke, while some days may be worse than others, you will continue to improve.  Call 911 right away if you have any of the following symptoms of another stroke:  B: Balance: Sudden: Dizziness, loss of balance, or a sense of falling, difficulty with coordinating movement  E: Eyes: Sudden double vision or trouble seeing in one or both eyes  F: Face: Sudden uneven face  A: Arms (Legs): Sudden weakness, tingling, or loss of feeling on one side of your face or body  S: Speech: Sudden trouble talking or slurred speech, sudden difficulty understanding others  T: Time: Please call 911 right away and go to the emergency room  •Sudden, severe headache  •Blackouts or seizures

## 2024-11-08 NOTE — CONSULT NOTE ADULT - ASSESSMENT
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 Ferrari) on MMF/prednisone, paroxysmal atrial fibrillation/flutter s/p ablation x 3 (most recently 10/24, compliant with Eliquis 2.5 BID), HADLEY, and HTN, who presented to [facility] with dysarthria between 9-10am on 11/7. Patient reports that he woke that morning and then suddenly he felt dizzy, had difficulty understanding people, and was very slow to communicate. MRI confirmed L insular acute/subacute infarction. TTE w/ bubble done and confirmed PFO and structural heart team was consulted for evaluation.    Plan:  Problem 1: PFO  -discussed with Dr. Martin and Dr. Bradley  -TTE w/ bubble showing PFO  -Pending b/l LE dopplers for DVT r/o  -Plan for JOHN on Monday to evaluate for SHANKAR clot and for PFO  -Plan pending further imaging  -Structural heart team will continue to follow    Problem 2: L insular acute/subacute infarction  -per stroke team: plan to continue Eliquis 5mg BID and ASA 81mg for 3 months until repeat MRA brain; will assess if occlusion is related to thrombus (will have resolved) vs. true steno-occlusive disease.  -Care per primary team    Problem 3: afib  -continue AC  -continue BB  -Care per primary team    Problem 4: HTN  -continue BB  -Care per primary team    I have reviewed clinical labs tests and reports, radiology tests and reports, as well as old patient medical records, and discussed with the referring physician.

## 2024-11-08 NOTE — DISCHARGE NOTE NURSING/CASE MANAGEMENT/SOCIAL WORK - NSDCFUADDAPPT_GEN_ALL_CORE_FT
Please continue taking Eliquis 5mg twice a day in addition to Aspirin 81mg daily and Atorvastatin daily for further stroke prevention until you see our team for follow up.     You will see our stroke team in the clinic on 11/12/24 at 10AM on [address] (please disregard the [address] address) and we will discuss setting you up for your transesophageal echocardiogram.     Please call Dr. Tejada (cardiologist) to establish care.

## 2024-11-08 NOTE — DISCHARGE NOTE NURSING/CASE MANAGEMENT/SOCIAL WORK - FINANCIAL ASSISTANCE
[facility] provides services at a reduced cost to those who are determined to be eligible through [facility]’s financial assistance program. Information regarding [facility]’s financial assistance program can be found by going to [email or web address] or by calling [phone].

## 2024-11-08 NOTE — CONSULT NOTE ADULT - PROBLEM SELECTOR RECOMMENDATION 9
Acute/subacute L-sided posterior insular cortex infarction with associated cytotoxic edema seen on bMRI, also with severe L M2 narrowing/occlusion seen on CTA. Unclear precipitant, he does have a history of atrial fibrillation/flutter for which he recently underwent ablation, may have had recurrent tachyarrhythmia episode but regardless has been adherent to anticoagulation. Endorses L lateral headache has been ongoing since ablation on 10/24 corresponding to region of stroke.  - management and work up as per stroke team  - A1c 5.6%, LDL 90  - on eliquis 5mg BID, ASA, high-intensity statin  - TTE pending - none in system  - permissive HTN goal 140-180 Acute/subacute L-sided posterior insular cortex infarction with associated cytotoxic edema seen on bMRI, also with severe L M2 narrowing/occlusion seen on CTA. Unclear precipitant, he does have a history of atrial fibrillation/flutter for which he recently underwent ablation, may have had recurrent tachyarrhythmia episode but regardless has been adherent to anticoagulation - however may have been under-dosed (reports Eliquis 2.5 BID but unclear why). Endorses L lateral headache has been ongoing since ablation on 10/24 corresponding to region of stroke.  - management and work up as per stroke team  - A1c 5.6%, LDL 90  - on eliquis 5mg BID, ASA, high-intensity statin  - TTE pending - none in system  - permissive HTN goal 140-180

## 2024-11-08 NOTE — CONSULT NOTE ADULT - TIME BILLING
Bedside exam and interview   Reviewed vitals, labs   Discussed patient's plan of care with Stroke ACP  Documentation of encounter  Excludes teaching time and/or separately reported services

## 2024-11-08 NOTE — SPEECH LANGUAGE PATHOLOGY EVALUATION - SLP GENERAL OBSERVATIONS
Pt was seen fully awake and alert, HOB fully elevated, on room air. A&Ox3, followed simple directives, and communicated wants/needs. Pt reported improving communication, though occasional word finding difficulty.

## 2024-11-08 NOTE — PROGRESS NOTE ADULT - ASSESSMENT
54y Male with PMHx of Afib s/p ablation (on Eliquis and compliant), HADLEY, RA, HTN, and scleritis, presenting to [facility] with acute onset difficulty speaking between 0930 and 1000 this AM. Patient reports that he woke up this morning in his normal state when, suddenly, at 0930/1000 he felt dizzy, had difficulty understanding people, and was very slow to communicate. On arrival to [facility], stroke code called. NCHCT performed and negative for any abnormalities, CT perfusion with 77cc mismatch in L MCA territory and cerebellum, CTA head and neck with severe narrowing/occlusion of the left M2 inferior division of the MCA. Patient was loaded with DAPT and transferred to [facility] for further work up and management.     Neuro  #CVA workup  - continue aspirin 81mg and Eliquis 5mg BID tomorrow  - continue atorvastatin 80mg daily  - q4hr stroke neuro checks and vitals  - obtain MRI Brain without contrast  - Stroke Code HCT Results: negative  - Stroke Code CTA Results: severe narrowing/occlusion of the left M2 inferior division of the MCA  - Stroke education    Cards  #HTN  - permissive hypertension, Goal -180  - hold home blood pressure medication for now  - obtain TTE and JOHN    #HLD  - high dose statin as above in CVA  - LDL results: pending    Pulm  - call provider if SPO2 < 94%    GI  #Nutrition/Fluids/Electrolytes   - replete K<4 and Mg <2  - Diet: DASH    Renal  - daily BMP       Endocrine  - A1C results: pending      - TSH results: pending    DVT Prophylaxis  - Eliquis  - SCDs for DVT prophylaxis     Dispo: pending PT/OT     Discussed daily hospital plans and goals with patient    Discussed with Neurology Attending Dr. Brar 54y Male with PMHx of Afib s/p ablation (on Eliquis and compliant), HADLEY, RA, HTN, and scleritis, presenting to [facility] with acute onset difficulty speaking between 0930 and 1000 this AM. Patient reports that he woke up this morning in his normal state when, suddenly, at 0930/1000 he felt dizzy, had difficulty understanding people, and was very slow to communicate. On arrival to [facility], stroke code called. NCHCT performed and negative for any abnormalities, CT perfusion with 77cc mismatch in L MCA territory and cerebellum, CTA head and neck with severe narrowing/occlusion of the left M2 inferior division of the MCA. Patient was loaded with DAPT and transferred to [facility] for further work up and management.     Neuro  #CVA workup  - continue aspirin 81mg and Eliquis 5mg BID for 3 months until repeat MRA   - continue atorvastatin 80mg daily  - q4hr stroke neuro checks and vitals  - MRI Brain: acute and subacute L sided posterior insular cortex infarction w/ associated cytotoxic edema  - Stroke Code HCT/P Results: 77cc mismatch in area of L MCA   - Stroke Code CTA Results: severe narrowing/occlusion of the left M2 inferior division of the MCA  - Stroke education    Cards  #HTN  - permissive hypertension, Goal -180  - hold home blood pressure medication for now    #PFO   Intracardiac shunt detected on TTE w/ bubble, likely PFO   - structural heart consult     #HLD  - high dose statin as above in CVA  - LDL results: HDL decreased to 37, total cholesterol 144, trig 88, LDL 90    Pulm  - call provider if SPO2 < 94%    GI  #Nutrition/Fluids/Electrolytes   - replete K<4 and Mg <2  - Diet: DASH    Renal  - daily BMP       Endocrine  - A1C results: 5.6      - TSH results: pending    DVT Prophylaxis  - Eliquis  - SCDs for DVT prophylaxis     Dispo: pending PT/OT     Discussed daily hospital plans and goals with patient    Discussed with Neurology Attending Dr. Brar

## 2024-11-08 NOTE — CONSULT NOTE ADULT - ASSESSMENT
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 Ferrari) on MMF/prednisone, paroxysmal atrial fibrillation/flutter s/p ablation x 3 (most recently 10/24, compliant with Eliquis), who presented with acute onset dysarthria found to have L-sided posterior insular cortex infarct and severe L M2 narrowing.

## 2024-11-08 NOTE — OCCUPATIONAL THERAPY INITIAL EVALUATION ADULT - GENERAL OBSERVATIONS, REHAB EVAL
Pt's RN Jessika aware of intent to eval/tx; cleared Pt. Pt received in supine - +telemetry, heplock. Pt fair in affect, +agreeable to OT.

## 2024-11-08 NOTE — CONSULT NOTE ADULT - PROBLEM SELECTOR RECOMMENDATION 2
+ arthropathy, follows with Rheumatologist Dr. Ferrari, reportedly no finalized Dx. Had recent flare and was on high dose steroids in addition to MMF, which patient is now in the process of slowly weaning down.  - c/w home prednisone 6mg  - c/w home MMF 1500 qAM/1000 qPM

## 2024-11-08 NOTE — SPEECH LANGUAGE PATHOLOGY EVALUATION - SLP DIAGNOSIS
Per the WAB-bedside, patient presents with functional receptive and expressive language abilities. However, occasional hesitations and phonemic paraphasias noted in verbal and written expressive communication. No dysarthria.  Presentation c/w left hemisphere involvement. Anticipate improvement in communication with ongoing neuro recovery. Pt may consider outpatient speech therapy if mild deficits persist. Pt aware and verbalized understanding.

## 2024-11-08 NOTE — DISCHARGE NOTE NURSING/CASE MANAGEMENT/SOCIAL WORK - NSDCPEELIQUISCOMP_GEN_ALL_CORE
05:30
Apixaban/Eliquis is used to treat and prevent blood clots. If you are not able to swallow the tablets whole, they may be crushed and mixed in water, apple juice, or applesauce and promptly taken within four hours. Never skip a dose of Apixaban/Eliquis. If you forget to take your Apixaban/Eliquis, take a dose as soon as you remember. If it is almost time for your next Apixaban/Eliquis dose, wait until then and take a regular dose. DO NOT take an extra pill to ‘catch up’.  NEVER TAKE A DOUBLE DOSE. Notify your doctor that you missed a dose. Take Apixaban/Eliquis at the same time each morning and evening. Apixaban/Eliquis may be taken with other medication or food.

## 2024-11-08 NOTE — DISCHARGE NOTE PROVIDER - NSDCFUADDAPPT_GEN_ALL_CORE_FT
Please continue taking Eliquis 5mg twice a day in addition to Aspirin 81mg daily and Atorvastatin daily for further stroke prevention until you see our team for follow up.  Please continue taking Eliquis 5mg twice a day in addition to Aspirin 81mg daily and Atorvastatin daily for further stroke prevention until you see our team for follow up.     Please call Dr. Eid's office to discuss your PFO seen on echo.     Please call Dr. Tejada (cardiologist) to establish care.  Please continue taking Eliquis 5mg twice a day in addition to Aspirin 81mg daily and Atorvastatin daily for further stroke prevention until you see our team for follow up.     You will see our stroke team in the clinic on 11/12/24 at 10AM on [address] (please disregard the [address] address) and we will discuss setting you up for your transesophageal echocardiogram.     Please call Dr. eTjada (cardiologist) to establish care.

## 2024-11-08 NOTE — CONSULT NOTE ADULT - ASSESSMENT
54 year-old male who presented with new dysarthria, found to have acute/subacute L posterior insular cortex infarct in the setting of severe M2 narrowing.     CVA, Dysarthria - pending TTE, eliquis, ASA, statin  Rheumatoid arthritis, scleritis - on prefnisone, MMF  Hx of A fib s/p ablation-Toprol 54 year-old male who presented with new dysarthria, found to have acute/subacute L posterior insular cortex infarct in the setting of severe M2 narrowing.     CVA, Dysarthria - pending TTE, eliquis, ASA, statin  Rheumatoid arthritis, scleritis - on prefnisone, MMF  Hx of A fib s/p ablation-Toprol      PLAN / RECOMMENDATIONS:     # Rehab / Mobilization:   - Initial therapy assessment: [ ] PT  [X] OT   - Continue skilled therapy while admitted to prevent secondary complications of immobility focus on transfer training, bed mobility, progressive ambulation, and equipment evaluation.   - Educated patient and family members on post-acute rehabilitation. Discussed anticipated rehab course.    # Bracing/Splinting:   - None indicated at this time      # Speech/ Swallow:   - Dysphagia screen [X]  - Diet:  reg + thins    # GI/ Bowel:  - Continue to monitor bowel patterns.     # / Bladder:  - Continue to monitor bladder patterns, avoid constipation.       # DVT Prophylaxis:   - SCDs, chemoprophylaxis - on Eliquis    # Disposition optimization:   - Disposition recommendation - home. No skilled therapy needs at this time  - d/c pending neurologic work up

## 2024-11-08 NOTE — DISCHARGE NOTE PROVIDER - HOSPITAL COURSE
Hospital course:  54y Male with PMHx of Afib s/p ablation (on Eliquis and compliant), HADLEY, RA, HTN, and scleritis, presenting to [facility] with acute onset difficulty speaking between 0930 and 1000 this AM. Patient reports that he woke up this morning in his normal state when, suddenly, at 0930/1000 he felt dizzy, had difficulty understanding people, and was very slow to communicate. MRI confirms L insular acute/subacute infarction. Plan to continue Eliquis 5mg BID and ASA 81mg for 3 months until repeat MRA brain; will assess is occlusion is related to thrombus (will have resolved) vs. true steno-occlusive disease. Echo with bubble confirmed PFO, structural heart consulted. LE dopplers performed and showed ************     During this hospital course, patient had an ischemic stroke in the left posterior insular cortex.   The stroke etiology is likely secondary to:  [x]atrial fibrillation  []small vessel disease from atherosclerotic risk factors  []other: ?PFO  []etiology workup still in progress    Patient had the following workup done in house:  CT Head: No acute intracranial injury  MR Head Non Contrast:  CT Angio Head: Severe narrowing/occlusion of the left M2 inferior division of the middle cerebral artery  .  CT Angio Neck: negative  [x]echo:  1. Normal left and right ventricular cavity size and systolic function,   LV EF 63%.   2. Mild symmetric left ventricular hypertrophy.   3. No significant valvular disease.   4. No pericardial effusion.   5. Intracardiac shunt, likely patent foramen ovale.  [x]labs: A1C: 5.6%, LDL: 90, TSH: 1.750  []other    Physical exam at discharge:  Neurologic:  -Mental status: Awake, alert, oriented to person, place, and time. Follows midline and cross commands. Attention/concentration intact. Fund of knowledge appropriate.  -Cranial nerves:   II: Visual fields are full to confrontation.  III, IV, VI: Extraocular movements are intact without nystagmus. Pupils equally round and reactive to light  V:  Facial sensation V1-V3 equal and intact   VII: Face is symmetric with normal eye closure and smile  Motor: Normal bulk and tone. No pronator drift. Strength bilateral upper extremity 5/5, bilateral lower extremities 5/5.  Sensation: Intact to light touch bilaterally. No neglect or extinction on double simultaneous testing.  Coordination: No dysmetria on finger-to-nose and heel-to-shin bilaterally    NIHSS: 0    New medications on discharge: ASA 81mg daily, Atorvastatin 80mg daily  Labs to be followed up: n/a  Imaging to be done as outpatient: MRA head in 3 months  Further outpatient workup: Structural heart for PFO   Hospital course:  54y Male with PMHx of Afib s/p ablation (on Eliquis and compliant), HADLEY, RA, HTN, and scleritis, presenting to [facility] with acute onset difficulty speaking between 0930 and 1000 this AM. Patient reports that he woke up this morning in his normal state when, suddenly, at 0930/1000 he felt dizzy, had difficulty understanding people, and was very slow to communicate. MRI confirms L insular acute/subacute infarction. Plan to continue Eliquis 5mg BID and ASA 81mg for 3 months until repeat MRA brain; will assess is occlusion is related to thrombus (will have resolved) vs. true steno-occlusive disease. Echo with bubble confirmed PFO, structural heart consulted. LE dopplers performed and negative for DVT. Hypercoags with genetics sent.     During this hospital course, patient had an ischemic stroke in the left posterior insular cortex.   The stroke etiology is likely secondary to:  [x]atrial fibrillation  []small vessel disease from atherosclerotic risk factors  []other: ?PFO  []etiology workup still in progress    Patient had the following workup done in house:  CT Head: No acute intracranial injury  MR Head Non Contrast:  CT Angio Head: Severe narrowing/occlusion of the left M2 inferior division of the middle cerebral artery  .  CT Angio Neck: negative  [x]echo:  1. Normal left and right ventricular cavity size and systolic function,   LV EF 63%.   2. Mild symmetric left ventricular hypertrophy.   3. No significant valvular disease.   4. No pericardial effusion.   5. Intracardiac shunt, likely patent foramen ovale.  [x]labs: A1C: 5.6%, LDL: 90, TSH: 1.750  []other    Physical exam at discharge:  Neurologic:  -Mental status: Awake, alert, oriented to person, place, and time. Follows midline and cross commands. Attention/concentration intact. Fund of knowledge appropriate.  -Cranial nerves:   II: Visual fields are full to confrontation.  III, IV, VI: Extraocular movements are intact without nystagmus. Pupils equally round and reactive to light  V:  Facial sensation V1-V3 equal and intact   VII: Face is symmetric with normal eye closure and smile  Motor: Normal bulk and tone. No pronator drift. Strength bilateral upper extremity 5/5, bilateral lower extremities 5/5.  Sensation: Intact to light touch bilaterally. No neglect or extinction on double simultaneous testing.  Coordination: No dysmetria on finger-to-nose and heel-to-shin bilaterally    NIHSS: 0    New medications on discharge: ASA 81mg daily, Atorvastatin 80mg daily  Labs to be followed up: hypercoag panel  Imaging to be done as outpatient: MRA head in 3 months  Further outpatient workup: Structural heart for PFO   Hospital course:  54y Male with PMHx of Afib s/p ablation (on Eliquis and compliant), HADLEY, RA, HTN, and scleritis, presenting to [facility] with acute onset difficulty speaking between 0930 and 1000 this AM. Patient reports that he woke up this morning in his normal state when, suddenly, at 0930/1000 he felt dizzy, had difficulty understanding people, and was very slow to communicate. MRI confirms L insular acute/subacute infarction. Plan to continue Eliquis 5mg BID and ASA 81mg for 3 months until repeat MRA brain; will assess is occlusion is related to thrombus (will have resolved) vs. true steno-occlusive disease. Echo with bubble confirmed PFO, structural heart consulted. LE dopplers performed and negative for DVT. Hypercoags with genetics sent. Plan to undergo JOHN as outpatient. We will see him in 1 week and set him up for an expedited JOHN, per structural heart request.     During this hospital course, patient had an ischemic stroke in the left posterior insular cortex.   The stroke etiology is likely secondary to:  [x]atrial fibrillation  []small vessel disease from atherosclerotic risk factors  []other: ?PFO  []etiology workup still in progress    Patient had the following workup done in house:  CT Head: No acute intracranial injury  MR Head Non Contrast:  CT Angio Head: Severe narrowing/occlusion of the left M2 inferior division of the middle cerebral artery  .  CT Angio Neck: negative  [x]echo:  1. Normal left and right ventricular cavity size and systolic function,   LV EF 63%.   2. Mild symmetric left ventricular hypertrophy.   3. No significant valvular disease.   4. No pericardial effusion.   5. Intracardiac shunt, likely patent foramen ovale.  [x] LE dopplers negative  [x]labs: A1C: 5.6%, LDL: 90, TSH: 1.750  []other    Physical exam at discharge:  Neurologic:  -Mental status: Awake, alert, oriented to person, place, and time. Follows midline and cross commands. Attention/concentration intact. Fund of knowledge appropriate.  -Cranial nerves:   II: Visual fields are full to confrontation.  III, IV, VI: Extraocular movements are intact without nystagmus. Pupils equally round and reactive to light  V:  Facial sensation V1-V3 equal and intact   VII: Face is symmetric with normal eye closure and smile  Motor: Normal bulk and tone. No pronator drift. Strength bilateral upper extremity 5/5, bilateral lower extremities 5/5.  Sensation: Intact to light touch bilaterally. No neglect or extinction on double simultaneous testing.  Coordination: No dysmetria on finger-to-nose and heel-to-shin bilaterally    NIHSS: 0    New medications on discharge: ASA 81mg daily, Atorvastatin 80mg daily  Labs to be followed up: hypercoag panel  Imaging to be done as outpatient: MRA head in 3 months  Further outpatient workup: Structural heart for PFO, JOHN   Hospital course:  54y Male with PMHx of Afib s/p ablation (on Eliquis and compliant), HADLEY, RA, HTN, and scleritis, presenting to [facility] with acute onset difficulty speaking between 0930 and 1000 this AM. Patient reports that he woke up this morning in his normal state when, suddenly, at 0930/1000 he felt dizzy, had difficulty understanding people, and was very slow to communicate. MRI confirms L insular acute/subacute infarction. Plan to continue Eliquis 5mg BID and ASA 81mg for 3 months until repeat MRA brain; will assess is occlusion is related to thrombus (will have resolved) vs. true steno-occlusive disease. Echo with bubble confirmed PFO, structural heart consulted. LE dopplers performed and negative for DVT. Hypercoags with genetics sent. Plan to undergo JOHN as outpatient. We will see him in 1 week and set him up for an expedited JOHN, per structural heart request.     During this hospital course, patient had an ischemic stroke in the left posterior insular cortex.   The stroke etiology is likely secondary to:  [x]atrial fibrillation  []small vessel disease from atherosclerotic risk factors  []other: ?PFO  []etiology workup still in progress    Patient had the following workup done in house:  CT Head: No acute intracranial injury  MR Head Non Contrast:  CT Angio Head: Severe narrowing/occlusion of the left M2 inferior division of the middle cerebral artery  .  CT Angio Neck: negative  [x]echo:  1. Normal left and right ventricular cavity size and systolic function,   LV EF 63%.   2. Mild symmetric left ventricular hypertrophy.   3. No significant valvular disease.   4. No pericardial effusion.   5. Intracardiac shunt, likely patent foramen ovale.  [x] LE dopplers negative  [x]labs: A1C: 5.6%, LDL: 90, TSH: 1.750  []other    Physical exam at discharge:  Neurologic:  -Mental status: Awake, alert, oriented to person, place, and time. Follows midline and cross commands. Attention/concentration intact. Fund of knowledge appropriate.  -Cranial nerves:   II: Visual fields are full to confrontation.  III, IV, VI: Extraocular movements are intact without nystagmus. Pupils equally round and reactive to light  V:  Facial sensation V1-V3 equal and intact   VII: Face is symmetric with normal eye closure and smile  Motor: Normal bulk and tone. No pronator drift. Strength bilateral upper extremity 5/5, bilateral lower extremities 5/5.  Sensation: Intact to light touch bilaterally. No neglect or extinction on double simultaneous testing.  Coordination: No dysmetria on finger-to-nose and heel-to-shin bilaterally    NIHSS: 0    New medications on discharge: ASA 81mg daily, Atorvastatin 80mg daily, protonix 40mg daily  Labs to be followed up: hypercoag panel  Imaging to be done as outpatient: MRA head in 3 months  Further outpatient workup: Structural heart for PFO, JOHN   Hospital course:  54y Male with PMHx of Afib s/p ablation (on Eliquis and compliant), HADLEY, RA, HTN, and scleritis, presenting to [facility] with acute onset difficulty speaking between 0930 and 1000 this AM. Patient reports that he woke up this morning in his normal state when, suddenly, at 0930/1000 he felt dizzy, had difficulty understanding people, and was very slow to communicate. MRI confirms L insular acute/subacute infarction. Plan to continue Eliquis 5mg BID and ASA 81mg for 3 months until repeat MRA brain; will assess is occlusion is related to thrombus (will have resolved) vs. true steno-occlusive disease. Echo with bubble confirmed PFO, structural heart consulted. LE dopplers performed and negative for DVT. Hypercoags with genetics sent. Plan to undergo JOHN as outpatient. We will see him in 1 week and set him up for an expedited JOHN, per structural heart request.     During this hospital course, patient had an ischemic stroke in the left posterior insular cortex.   The stroke etiology is likely secondary to:  [x]atrial fibrillation  []small vessel disease from atherosclerotic risk factors  []other: ?PFO  []etiology workup still in progress    Patient had the following workup done in house:  CT Head: No acute intracranial injury  MR Head Non Contrast:  CT Angio Head: Severe narrowing/occlusion of the left M2 inferior division of the middle cerebral artery  .  CT Angio Neck: negative  [x]echo:  1. Normal left and right ventricular cavity size and systolic function,   LV EF 63%.   2. Mild symmetric left ventricular hypertrophy.   3. No significant valvular disease.   4. No pericardial effusion.   5. Intracardiac shunt, likely patent foramen ovale.  [x] LE dopplers negative  [x]labs: A1C: 5.6%, LDL: 90, TSH: 1.750  []other    Physical exam at discharge:  Neurologic:  -Mental status: Awake, alert, oriented to person, place, and time. Follows midline and cross commands. Attention/concentration intact. Fund of knowledge appropriate.  -Cranial nerves:   II: Visual fields are full to confrontation.  III, IV, VI: Extraocular movements are intact without nystagmus. Pupils equally round and reactive to light  V:  Facial sensation V1-V3 equal and intact   VII: Face is symmetric with normal eye closure and smile  Motor: Normal bulk and tone. No pronator drift. Strength bilateral upper extremity 5/5, bilateral lower extremities 5/5.  Sensation: Intact to light touch bilaterally. No neglect or extinction on double simultaneous testing.  Coordination: No dysmetria on finger-to-nose and heel-to-shin bilaterally    NIHSS: 0    New medications on discharge: ASA 81mg daily, Atorvastatin 80mg daily, protonix 40mg daily  Labs to be followed up: hypercoag panel (DRVVT was not drawn by lab)  Imaging to be done as outpatient: MRA head in 3 months  Further outpatient workup: Structural heart for PFO, JOHN   Hospital course:  54y Male with PMHx of Afib s/p ablation (on Eliquis and compliant), HADLEY, RA, HTN, and scleritis, presenting to [facility] with acute onset difficulty speaking between 0930 and 1000 this AM. Patient reports that he woke up this morning in his normal state when, suddenly, at 0930/1000 he felt dizzy, had difficulty understanding people, and was very slow to communicate. MRI confirms L insular acute/subacute infarction. Plan to continue Eliquis 5mg BID and ASA 81mg for 3 months until repeat MRA brain; will assess is occlusion is related to thrombus (will have resolved) vs. true steno-occlusive disease. Echo with bubble confirmed PFO, structural heart consulted. LE dopplers performed and negative for DVT. Hypercoags with genetics sent. Plan to undergo JOHN as outpatient. We will see him in 1 week and set him up for an expedited JOHN, per structural heart request.     During this hospital course, patient had an ischemic stroke in the left posterior insular cortex.   The stroke etiology is likely secondary to:  [x]atrial fibrillation  []small vessel disease from atherosclerotic risk factors  []other: ?PFO  []etiology workup still in progress    Patient had the following workup done in house:  CT Head: No acute intracranial injury  MR Head Non Contrast:  CT Angio Head: Severe narrowing/occlusion of the left M2 inferior division of the middle cerebral artery  .  CT Angio Neck: negative  [x]echo:  1. Normal left and right ventricular cavity size and systolic function,   LV EF 63%.   2. Mild symmetric left ventricular hypertrophy.   3. No significant valvular disease.   4. No pericardial effusion.   5. Intracardiac shunt, likely patent foramen ovale.  [x] LE dopplers negative  [x]labs: A1C: 5.6%, LDL: 90, TSH: 1.750  []other    Physical exam at discharge:  Neurologic:  -Mental status: Awake, alert, oriented to person, place, and time. Follows midline and cross commands. Attention/concentration intact. Fund of knowledge appropriate.  -Cranial nerves:   II: Visual fields are full to confrontation.  III, IV, VI: Extraocular movements are intact without nystagmus. Pupils equally round and reactive to light  V:  Facial sensation V1-V3 equal and intact   VII: Face is symmetric with normal eye closure and smile  Motor: Normal bulk and tone. No pronator drift. Strength bilateral upper extremity 5/5, bilateral lower extremities 5/5.  Sensation: Intact to light touch bilaterally. No neglect or extinction on double simultaneous testing.  Coordination: No dysmetria on finger-to-nose and heel-to-shin bilaterally    NIHSS: 0    New medications on discharge: ASA 81mg daily, Atorvastatin 80mg daily, protonix 40mg daily  Labs to be followed up: hypercoag panel  Imaging to be done as outpatient: MRA head in 3 months  Further outpatient workup: Structural heart for PFO, JOHN

## 2024-11-08 NOTE — DISCHARGE NOTE NURSING/CASE MANAGEMENT/SOCIAL WORK - PATIENT PORTAL LINK FT
You can access the FollowMyHealth Patient Portal offered by [facility] by registering at the following website: [email or web address]. By joining [facility]’s FollowMyHealth portal, you will also be able to view your health information using other applications (apps) compatible with our system.

## 2024-11-08 NOTE — OCCUPATIONAL THERAPY INITIAL EVALUATION ADULT - PERTINENT HX OF CURRENT PROBLEM, REHAB EVAL
54y Male with PMHx of Afib s/p ablation (on Eliquis and compliant), HADLEY, RA, HTN, and scleritis, presenting to [facility] with acute onset difficulty speaking between 0930 and 1000 this AM. Patient reports that he woke up this morning in his normal state when, suddenly, at 0930/1000 he felt dizzy, had difficulty understanding people, and was very slow to communicate. On arrival to [facility], stroke code called. NCHCT performed and negative for any abnormalities, CT perfusion with 77cc mismatch in L MCA territory and cerebellum, CTA head and neck with severe narrowing/occlusion of the left M2 inferior division of the MCA. Patient was loaded with DAPT and transferred to [facility] for further work up and management.

## 2024-11-08 NOTE — SPEECH LANGUAGE PATHOLOGY EVALUATION - SLP PERTINENT HISTORY OF CURRENT PROBLEM
PMHx of Afib s/p ablation (on Eliquis and compliant), HADLEY, RA, HTN, and scleritis, presenting to [facility] with acute onset difficulty speaking between 0930 and 1000 this AM. Patient reports that he woke up this morning in his normal state when, suddenly, at 0930/1000 he felt dizzy, had difficulty understanding people, and was very slow to communicate. On arrival to [facility], stroke code called. NCHCT performed and negative for any abnormalities, CT perfusion with 77cc mismatch in L MCA territory and cerebellum, CTA head and neck with severe narrowing/occlusion of the left M2 inferior division of the MCA. Patient was loaded with DAPT and transferred to [facility] for further work up and management.

## 2024-11-08 NOTE — DISCHARGE NOTE PROVIDER - CARE PROVIDERS DIRECT ADDRESSES
,[email or web address]

## 2024-11-08 NOTE — DISCHARGE NOTE PROVIDER - NSDCMRMEDTOKEN_GEN_ALL_CORE_FT
acetaminophen 325 mg oral tablet: 2 tab(s) orally every 6 hours As needed Mild Pain (1 - 3)  aspirin 81 mg oral delayed release tablet: 1 tab(s) orally once a day  atorvastatin 80 mg oral tablet: 1 tab(s) orally once a day (at bedtime)  Eliquis 5 mg oral tablet: 1 tab(s) orally 2 times a day  Metoprolol Succinate ER 25 mg oral tablet, extended release: 1 tab(s) orally once a day  mycophenolate mofetil 500 mg oral tablet: 3 tab(s) orally once a day  mycophenolate mofetil 500 mg oral tablet: 2 tab(s) orally once a day (at bedtime)  predniSONE: 6 milligram(s) orally once a day   aspirin 81 mg oral delayed release tablet: 1 tab(s) orally once a day  atorvastatin 80 mg oral tablet: 1 tab(s) orally once a day (at bedtime)  Eliquis 5 mg oral tablet: 1 tab(s) orally 2 times a day  Metoprolol Succinate ER 25 mg oral tablet, extended release: 1 tab(s) orally once a day  mycophenolate mofetil 500 mg oral tablet: 3 tab(s) orally once a day  mycophenolate mofetil 500 mg oral tablet: 2 tab(s) orally once a day (at bedtime)  predniSONE: 6 milligram(s) orally once a day  Tylenol Extra Strength 500 mg oral tablet: 2 tab(s) orally 3 times a day as needed for  moderate pain   aspirin 81 mg oral delayed release tablet: 1 tab(s) orally once a day  atorvastatin 80 mg oral tablet: 1 tab(s) orally once a day (at bedtime)  Eliquis 5 mg oral tablet: 1 tab(s) orally 2 times a day  Metoprolol Succinate ER 25 mg oral tablet, extended release: 1 tab(s) orally once a day  mycophenolate mofetil 500 mg oral tablet: 3 tab(s) orally once a day  mycophenolate mofetil 500 mg oral tablet: 2 tab(s) orally once a day (at bedtime)  predniSONE: 6 milligram(s) orally once a day  Protonix 40 mg oral delayed release tablet: 1 tab(s) orally once a day  Tylenol Extra Strength 500 mg oral tablet: 2 tab(s) orally 3 times a day as needed for  moderate pain

## 2024-11-08 NOTE — CONSULT NOTE ADULT - SUBJECTIVE AND OBJECTIVE BOX
Admission H&P:  HPI:   **STROKE HPI***    HPI: 54y Male with PMHx of Afib s/p ablation (on Eliquis and compliant), HADLEY, RA, HTN, and scleritis, presenting to [facility] with acute onset difficulty speaking between 0930 and 1000 this AM. Patient reports that he woke up this morning in his normal state when, suddenly, at 0930/1000 he felt dizzy, had difficulty understanding people, and was very slow to communicate. On arrival to [facility], stroke code called. NCHCT performed and negative for any abnormalities, CT perfusion with 77cc mismatch in L MCA territory and cerebellum, CTA head and neck with severe narrowing/occlusion of the left M2 inferior division of the MCA. Patient was loaded with DAPT and transferred to [facility] for further work up and management.     PAST MEDICAL & SURGICAL HISTORY:  Rheumatoid arthritis      Atrial fibrillation and flutter      Paroxysmal atrial fibrillation      Scleritis      HTN (hypertension)      Benign essential hypertension      HADLEY (obstructive sleep apnea)      Childhood asthma      H/O cardiac radiofrequency ablation      H/O cardiac radiofrequency ablation          FAMILY HISTORY:  FH: heart disease (Father)    FH: stroke (Mother)      T(C): 36.6 (11-07-24 @ 17:02), Max: 37.5 (11-07-24 @ 13:01)  HR: 77 (11-07-24 @ 17:02) (77 - 84)  BP: 147/89 (11-07-24 @ 17:02) (131/58 - 161/101)  RR: 18 (11-07-24 @ 17:02) (16 - 18)  SpO2: 99% (11-07-24 @ 17:02) (96% - 99%)    MEDICATION RECONCILIATION   MEDICATIONS  (STANDING):  metoclopramide Injectable 10 milliGRAM(s) IV Push once    MEDICATIONS  (PRN):    Allergies    losartan (Other)    Intolerances      Vital Signs Last 24 Hrs  T(C): 36.6 (07 Nov 2024 17:02), Max: 37.5 (07 Nov 2024 13:01)  T(F): 97.9 (07 Nov 2024 17:02), Max: 99.5 (07 Nov 2024 13:01)  HR: 77 (07 Nov 2024 17:02) (77 - 84)  BP: 147/89 (07 Nov 2024 17:02) (131/58 - 161/101)  BP(mean): --  RR: 18 (07 Nov 2024 17:02) (16 - 18)  SpO2: 99% (07 Nov 2024 17:02) (96% - 99%)    Parameters below as of 07 Nov 2024 17:02  Patient On (Oxygen Delivery Method): room air     (07 Nov 2024 18:08)      PAST MEDICAL & SURGICAL HISTORY:  Rheumatoid arthritis      Atrial fibrillation and flutter      Paroxysmal atrial fibrillation      Scleritis      HTN (hypertension)      Benign essential hypertension      HADLEY (obstructive sleep apnea)      Childhood asthma      H/O cardiac radiofrequency ablation      H/O cardiac radiofrequency ablation        Home Medications:  Eliquis 5 mg oral tablet: 1 tab(s) orally 2 times a day (23 Oct 2024 13:50)  mycophenolate mofetil 500 mg oral tablet: 3 tab(s) orally once a day (07 Nov 2024 18:47)  mycophenolate mofetil 500 mg oral tablet: 2 tab(s) orally once a day (at bedtime) (07 Nov 2024 18:48)  predniSONE: 6 milligram(s) orally once a day (23 Oct 2024 13:50)    Allergies    losartan (Other)    Intolerances      FAMILY HISTORY:  FH: heart disease (Father)    FH: stroke (Mother)      Social History:      REVIEW OF SYSTEMS:  RESPIRATORY: No cough, No dyspnea  CARDIOVASCULAR: No chest pain, or leg swelling  GASTROINTESTINAL: no diarrhea  GENITOURINARY: No dysuria,   NEUROLOGICAL: No numbness, or tremors  ALLERGY AND IMMUNOLOGIC: No hives or eczema    Diet, DASH/TLC:   Sodium & Cholesterol Restricted (11-07-24 @ 18:39) [Active]      CURRENT MEDICATIONS:   acetaminophen     Tablet .. 650 milliGRAM(s) Oral every 6 hours PRN  apixaban 5 milliGRAM(s) Oral every 12 hours  aspirin enteric coated 81 milliGRAM(s) Oral daily  atorvastatin 80 milliGRAM(s) Oral at bedtime  influenza   Vaccine 0.5 milliLiter(s) IntraMuscular once  mycophenolate mofetil 1500 milliGRAM(s) Oral daily  mycophenolate mofetil 1000 milliGRAM(s) Oral at bedtime  predniSONE   Tablet 6 milliGRAM(s) Oral daily      VITAL SIGNS, INS/OUTS (last 24 hours):  Vital Signs Last 24 Hrs  T(C): 36.6 (08 Nov 2024 05:11), Max: 37.5 (07 Nov 2024 13:01)  T(F): 97.9 (08 Nov 2024 05:11), Max: 99.5 (07 Nov 2024 13:01)  HR: 73 (08 Nov 2024 05:10) (73 - 85)  BP: 131/86 (08 Nov 2024 05:10) (120/69 - 161/101)  BP(mean): 102 (08 Nov 2024 05:10) (86 - 114)  RR: 16 (08 Nov 2024 05:10) (16 - 18)  SpO2: 98% (08 Nov 2024 05:10) (95% - 99%)    Parameters below as of 08 Nov 2024 05:10  Patient On (Oxygen Delivery Method): room air      I&O's Summary      PHYSICAL EXAM:  Gen: Reclining in bed at time of exam, appears stated age  HEENT: NCAT, MMM, clear OP  Neck: supple, trachea at midline  CV: RRR, +S1/S2  Pulm: adequate respiratory effort, no increase in work of breathing  Abd: soft, ND  Skin: warm and dry,  Ext:   Neuro: AOx3, speaking in full sentences   Psych: affect and behavior appropriate, pleasant at time of interview    BASIC LABS:                        14.5   3.85  )-----------( 305      ( 08 Nov 2024 07:27 )             45.0     11-08    139  |  104  |  19  ----------------------------<  94  4.2   |  24  |  1.14    Ca    9.0      08 Nov 2024 07:27  Phos  4.0     11-08  Mg     2.2     11-08    TPro  7.9  /  Alb  4.0  /  TBili  0.5  /  DBili  x   /  AST  29  /  ALT  39  /  AlkPhos  102  11-07    PT/INR - ( 07 Nov 2024 13:02 )   PT: 14.1 sec;   INR: 1.22          PTT - ( 07 Nov 2024 13:02 )  PTT:34.6 sec  Urinalysis Basic - ( 08 Nov 2024 07:27 )    Color: x / Appearance: x / SG: x / pH: x  Gluc: 94 mg/dL / Ketone: x  / Bili: x / Urobili: x   Blood: x / Protein: x / Nitrite: x   Leuk Esterase: x / RBC: x / WBC x   Sq Epi: x / Non Sq Epi: x / Bacteria: x      CAPILLARY BLOOD GLUCOSE      POCT Blood Glucose.: 116 mg/dL (07 Nov 2024 13:03)      OTHER LABS:        MICRODATA:    Urinalysis with Rflx Culture (collected 07 Nov 2024 14:43)        IMAGING:    EKG:    #Diet - Diet, DASH/TLC:   Sodium & Cholesterol Restricted (11-07-24 @ 18:39) [Active]        #DVT PPx -  Admission H&P:   **STROKE HPI***  HPI: 54y Male with PMHx of Afib s/p ablation (on Eliquis and compliant), HADLEY, RA, HTN, and scleritis, presenting to [facility] with acute onset difficulty speaking between 0930 and 1000 this AM. Patient reports that he woke up this morning in his normal state when, suddenly, at 0930/1000 he felt dizzy, had difficulty understanding people, and was very slow to communicate. On arrival to [facility], stroke code called. NCHCT performed and negative for any abnormalities, CT perfusion with 77cc mismatch in L MCA territory and cerebellum, CTA head and neck with severe narrowing/occlusion of the left M2 inferior division of the MCA. Patient was loaded with DAPT and transferred to [facility] for further work up and management.     INTERVAL HISTORY:  - yesterday felt that he was disorganized and "brain wasn't working"  - Feels that he is mostly back to his baseline, memory is intact and able to speak without issues  - reports that he had an atrial flutter ablation on 10/24 which had gone well, but he has had a L sided headache since the ablation  - headache is somewhat relieved with Tylenol, but has recurred daily since ablation  - he has not felt any palpitations, but states that he does not typically feel when he is in atrial flutter or fibrillation as he has been rate-controlled since 2022  - denies any weakness or numbness any where in his body  - denies chest pain, SOB  - is taking prednisone at 6mg daily, MMF 1000/1500 - managed by his Rheumatologist, is weaning down medications     PAST MEDICAL & SURGICAL HISTORY:  Rheumatoid arthritis  Atrial fibrillation and flutter  Paroxysmal atrial fibrillation  Scleritis  HTN (hypertension)  Benign essential hypertension  HADLEY (obstructive sleep apnea)  Childhood asthma  H/O cardiac radiofrequency ablation  H/O cardiac radiofrequency ablation    FAMILY HISTORY:  FH: heart disease (Father)  FH: stroke (Mother)    T(C): 36.6 (11-07-24 @ 17:02), Max: 37.5 (11-07-24 @ 13:01)  HR: 77 (11-07-24 @ 17:02) (77 - 84)  BP: 147/89 (11-07-24 @ 17:02) (131/58 - 161/101)  RR: 18 (11-07-24 @ 17:02) (16 - 18)  SpO2: 99% (11-07-24 @ 17:02) (96% - 99%)    MEDICATION RECONCILIATION   MEDICATIONS  (STANDING):  metoclopramide Injectable 10 milliGRAM(s) IV Push once    MEDICATIONS  (PRN):    Allergies  losartan (Other)    Vital Signs Last 24 Hrs  T(C): 36.6 (07 Nov 2024 17:02), Max: 37.5 (07 Nov 2024 13:01)  T(F): 97.9 (07 Nov 2024 17:02), Max: 99.5 (07 Nov 2024 13:01)  HR: 77 (07 Nov 2024 17:02) (77 - 84)  BP: 147/89 (07 Nov 2024 17:02) (131/58 - 161/101)  BP(mean): --  RR: 18 (07 Nov 2024 17:02) (16 - 18)  SpO2: 99% (07 Nov 2024 17:02) (96% - 99%)    Parameters below as of 07 Nov 2024 17:02  Patient On (Oxygen Delivery Method): room air     (07 Nov 2024 18:08)      PAST MEDICAL & SURGICAL HISTORY:  Rheumatoid arthritis  Atrial fibrillation and flutter  Paroxysmal atrial fibillation  Scleritis  HTN (hypertension)  Benign essential hypertension  HADLEY (obstructive sleep apnea)  Childhood asthma  H/O cardiac radiofrequency ablation  H/O cardiac radiofrequency ablation    Home Medications:  Eliquis 5 mg oral tablet: 1 tab(s) orally 2 times a day (23 Oct 2024 13:50)  mycophenolate mofetil 500 mg oral tablet: 3 tab(s) orally once a day (07 Nov 2024 18:47)  mycophenolate mofetil 500 mg oral tablet: 2 tab(s) orally once a day (at bedtime) (07 Nov 2024 18:48)  predniSONE: 6 milligram(s) orally once a day (23 Oct 2024 13:50)    Allergies  losartan (Other)    FAMILY HISTORY:  FH: heart disease (Father)    FH: stroke (Mother)    Social History: no smoking, no EtOH use    REVIEW OF SYSTEMS:  RESPIRATORY: No cough, No dyspnea  CARDIOVASCULAR: No chest pain, or leg swelling  GASTROINTESTINAL: no diarrhea  GENITOURINARY: No dysuria,   NEUROLOGICAL: No numbness, or tremors  ALLERGY AND IMMUNOLOGIC: No hives or eczema    Diet, DASH/TLC:   Sodium & Cholesterol Restricted (11-07-24 @ 18:39) [Active]    CURRENT MEDICATIONS:   acetaminophen     Tablet .. 650 milliGRAM(s) Oral every 6 hours PRN  apixaban 5 milliGRAM(s) Oral every 12 hours  aspirin enteric coated 81 milliGRAM(s) Oral daily  atorvastatin 80 milliGRAM(s) Oral at bedtime  influenza   Vaccine 0.5 milliLiter(s) IntraMuscular once  mycophenolate mofetil 1500 milliGRAM(s) Oral daily  mycophenolate mofetil 1000 milliGRAM(s) Oral at bedtime  predniSONE   Tablet 6 milliGRAM(s) Oral daily    VITAL SIGNS, INS/OUTS (last 24 hours):  Vital Signs Last 24 Hrs  T(C): 36.6 (08 Nov 2024 05:11), Max: 37.5 (07 Nov 2024 13:01)  T(F): 97.9 (08 Nov 2024 05:11), Max: 99.5 (07 Nov 2024 13:01)  HR: 73 (08 Nov 2024 05:10) (73 - 85)  BP: 131/86 (08 Nov 2024 05:10) (120/69 - 161/101)  BP(mean): 102 (08 Nov 2024 05:10) (86 - 114)  RR: 16 (08 Nov 2024 05:10) (16 - 18)  SpO2: 98% (08 Nov 2024 05:10) (95% - 99%)    Parameters below as of 08 Nov 2024 05:10  Patient On (Oxygen Delivery Method): room air    I&O's Summary    PHYSICAL EXAM:  Gen: sitting up in bed, INAD, pleasant, conversant  HEENT: NCAT, MMM, clear OP  Neck: supple, trachea at midline  CV: RRR  Pulm: adequate respiratory effort, no increase in work of breathing, CTAB  Abd: soft, ND  Skin: warm and dry,  Ext: wwp, no edema  Neuro: AOx3, speaking in full sentences, non-focal  Psych: affect and behavior appropriate, pleasant at time of interview    BASIC LABS:             14.5   3.85  )-----------( 305      ( 08 Nov 2024 07:27 )             45.0     11-08    139  |  104  |  19  ----------------------------<  94  4.2   |  24  |  1.14    Ca    9.0      08 Nov 2024 07:27  Phos  4.0     11-08  Mg     2.2     11-08    TPro  7.9  /  Alb  4.0  /  TBili  0.5  /  DBili  x   /  AST  29  /  ALT  39  /  AlkPhos  102  11-07    PT/INR - ( 07 Nov 2024 13:02 )   PT: 14.1 sec;   INR: 1.22          PTT - ( 07 Nov 2024 13:02 )  PTT:34.6 sec  Urinalysis Basic - ( 08 Nov 2024 07:27 )    Color: x / Appearance: x / SG: x / pH: x  Gluc: 94 mg/dL / Ketone: x  / Bili: x / Urobili: x   Blood: x / Protein: x / Nitrite: x   Leuk Esterase: x / RBC: x / WBC x   Sq Epi: x / Non Sq Epi: x / Bacteria: x      CAPILLARY BLOOD GLUCOSE    POCT Blood Glucose.: 116 mg/dL (07 Nov 2024 13:03)    OTHER LABS:    MICRODATA:  Urinalysis with Rflx Culture (collected 07 Nov 2024 14:43)    IMAGING:  CT brain: no acute intracranial injury  CTA H/N: severe narrowing/occlusion of L M2 inferior division of MCA, no aneurysm  MRI brain: acute/subacute L-sided posterior insular cortex infarction with associated cytotoxic edema, no associated hemorrhage    EKG: reviewed, sinus rhythm with 1st degree AVB, RBBB - similar to recent EKG 10/23    #Diet - Diet, DASH/TLC:   Sodium & Cholesterol Restricted (11-07-24 @ 18:39) [Active]

## 2024-11-08 NOTE — CONSULT NOTE ADULT - PROBLEM SELECTOR RECOMMENDATION 4
S/p PVI ablation x2 (1026, 2023), previously on Multaq but discontinued as pt remained in AF/flutter. Underwent PFA ablation 10/24/24.  - c/w home toprol 25XL

## 2024-11-08 NOTE — CONSULT NOTE ADULT - SUBJECTIVE AND OBJECTIVE BOX
HPI:    54y Male with PMHx of Afib s/p ablation (on Eliquis and compliant), HADLEY, RA, HTN, and scleritis, presenting to [facility] with acute onset difficulty speaking between. On arrival to [facility], stroke code called. NCHCT performed and negative for any abnormalities, CT perfusion with 77cc mismatch in L MCA territory and cerebellum, CTA head and neck with severe narrowing/occlusion of the left M2 inferior division of the MCA. Patient was loaded with DAPT and transferred to [facility] for further work up and management.     CTA head/Neck: severe stenosis vs. 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     -----------------------------------------------------------------------  SUBJECTIVE:      -----------------------------------------------------------------------  REVIEW OF SYSTEMS  Constitutional - No fever,  +fatigue  Neurological -   Pain -   Bowel -   Bladder -   -----------------------------------------------------------------------  FUNCTIONAL HISTORY:  Lives with his wife in a private home with 2 RONDA.   PTA independent with ADLs and functional mobility.    CURRENT FUNCTIONAL STATUS:    Physical Therapy:      Occupational Therapy: 11/8 Initial Eval    Proprioception:   · Coordination Assessed	finger to nose; Grossly intact    · Sensory Tests	CN Testing: b/l frontalis intact; b/l buccinator intact; smile symmetrical; tongue protrusion at midline; b/l eyes open/close intact; b/l shoulder elevation intact    Visual Assessment:    Visual Assessment:    Visual Assessment:   · Visual Tracking	normal  · Visual Neglect	none  · Visual Field Cuts	none  · Eye Muscle Balance	normal  · Visual Scanning	WFL  · Visual Convergence	normal  · Visual Depth Perception	WFL    Fine Motor Coordination:     Grossly Intact:   · Grossly Intact	Left UE; Right UE    Fine Motor Coordination:   · Left Hand, Finger to Nose	normal performance  · Right Hand, Finger to Nose	normal performance  · Left Hand Thumb/Finger Opposition Skills	normal performance  · Right Hand Thumb/Finger Opposition Skills	normal performance  · Left Hand, Manipulation of Objects	normal performance  · Right Hand, Manipulation of Objects	normal performance  · Left Hand, Diadochokinesis Skills	normal performance  · Right Hand, Diadochokinesis Skills	normal performance    Upper Body Dressing Training:     · Level of [place]	independent    Lower Body Dressing Training:     · Level of [place]	independent    Toilet Hygiene Training:     · Level of [place]	independent    Grooming Training:     · Level of [place]	independent    Eating/Self-Feeding Training:     · Level of [place]	independent          Speech Therapy:    -----------------------------------------------------------------------  PAST MEDICAL & SURGICAL HISTORY  Rheumatoid arthritis    Atrial fibrillation and flutter    Paroxysmal atrial fibrillation    Scleritis    HTN (hypertension)    Benign essential hypertension    Benign essential HTN    HADLEY (obstructive sleep apnea)    Childhood asthma    H/O cardiac radiofrequency ablation    H/O cardiac radiofrequency ablation      FAMILY HISTORY   FH: heart disease (Father)    FH: stroke (Mother)      SOCIAL HISTORY  As above  -----------------------------------------------------------------------  VITALS  T(C): 36.6 (11-08-24 @ 10:09), Max: 36.9 (11-07-24 @ 14:24)  HR: 84 (11-08-24 @ 09:30) (73 - 85)  BP: 144/86 (11-08-24 @ 09:30) (120/69 - 153/88)  RR: 21 (11-08-24 @ 09:30) (16 - 23)  SpO2: 97% (11-08-24 @ 09:30) (95% - 99%)  Wt(kg): --    PHYSICAL EXAM    -----------------------------------------------------------------------  RECENT LABS  CBC Full  -  ( 08 Nov 2024 07:27 )  WBC Count : 3.85 K/uL  RBC Count : 5.25 M/uL  Hemoglobin : 14.5 g/dL  Hematocrit : 45.0 %  Platelet Count - Automated : 305 K/uL  Mean Cell Volume : 85.7 fl  Mean Cell Hemoglobin : 27.6 pg  Mean Cell Hemoglobin Concentration : 32.2 g/dL  Auto Neutrophil # : x  Auto Lymphocyte # : x  Auto Monocyte # : x  Auto Eosinophil # : x  Auto Basophil # : x  Auto Neutrophil % : x  Auto Lymphocyte % : x  Auto Monocyte % : x  Auto Eosinophil % : x  Auto Basophil % : x    11-08    139  |  104  |  19  ----------------------------<  94  4.2   |  24  |  1.14    Ca    9.0      08 Nov 2024 07:27  Phos  4.0     11-08  Mg     2.2     11-08    TPro  7.9  /  Alb  4.0  /  TBili  0.5  /  DBili  x   /  AST  29  /  ALT  39  /  AlkPhos  102  11-07    Urinalysis Basic - ( 08 Nov 2024 07:27 )    Color: x / Appearance: x / SG: x / pH: x  Gluc: 94 mg/dL / Ketone: x  / Bili: x / Urobili: x   Blood: x / Protein: x / Nitrite: x   Leuk Esterase: x / RBC: x / WBC x   Sq Epi: x / Non Sq Epi: x / Bacteria: x      -----------------------------------------------------------------------  IMAGING:    -----------------------------------------------------------------------  ALLERGIES  losartan (Other)      MEDICATIONS   acetaminophen     Tablet .. 650 milliGRAM(s) Oral every 6 hours PRN  apixaban 5 milliGRAM(s) Oral every 12 hours  aspirin enteric coated 81 milliGRAM(s) Oral daily  atorvastatin 80 milliGRAM(s) Oral at bedtime  influenza   Vaccine 0.5 milliLiter(s) IntraMuscular once  mycophenolate mofetil 1500 milliGRAM(s) Oral daily  mycophenolate mofetil 1000 milliGRAM(s) Oral at bedtime  predniSONE   Tablet 6 milliGRAM(s) Oral daily    -----------------------------------------------------------------------   HPI:    54y Male with PMHx of Afib s/p ablation (on Eliquis and compliant), HADLEY, RA, HTN, and scleritis, presenting to [facility] with acute onset difficulty speaking between. On arrival to [facility], stroke code called. NCHCT performed and negative for any abnormalities, CT perfusion with 77cc mismatch in L MCA territory and cerebellum, CTA head and neck with severe narrowing/occlusion of the left M2 inferior division of the MCA. Patient was loaded with DAPT and transferred to [facility] for further work up and management.     CTA head/Neck: severe stenosis vs. 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     -----------------------------------------------------------------------  SUBJECTIVE:  Patient seen and evaluated at bedside this afternoon. Feels his speech is near his baseline. Denies any focal weakness or issues with coordination. Worked with OT without issues.     -----------------------------------------------------------------------  REVIEW OF SYSTEMS  Constitutional - No fever, no palpitations or chest pain  Neurological - improved  Pain - denies  Bowel - no issues  Bladder - no issues  -----------------------------------------------------------------------  FUNCTIONAL HISTORY:  Lives with his wife in a private home with 2 RONDA.   PTA independent with ADLs and functional mobility.    CURRENT FUNCTIONAL STATUS:    Physical Therapy:      Occupational Therapy: 11/8 Initial Eval    Proprioception:   · Coordination Assessed	finger to nose; Grossly intact    · Sensory Tests	CN Testing: b/l frontalis intact; b/l buccinator intact; smile symmetrical; tongue protrusion at midline; b/l eyes open/close intact; b/l shoulder elevation intact    Visual Assessment:    Visual Assessment:    Visual Assessment:   · Visual Tracking	normal  · Visual Neglect	none  · Visual Field Cuts	none  · Eye Muscle Balance	normal  · Visual Scanning	WFL  · Visual Convergence	normal  · Visual Depth Perception	WFL    Fine Motor Coordination:     Grossly Intact:   · Grossly Intact	Left UE; Right UE    Fine Motor Coordination:   · Left Hand, Finger to Nose	normal performance  · Right Hand, Finger to Nose	normal performance  · Left Hand Thumb/Finger Opposition Skills	normal performance  · Right Hand Thumb/Finger Opposition Skills	normal performance  · Left Hand, Manipulation of Objects	normal performance  · Right Hand, Manipulation of Objects	normal performance  · Left Hand, Diadochokinesis Skills	normal performance  · Right Hand, Diadochokinesis Skills	normal performance    Upper Body Dressing Training:     · Level of [place]	independent    Lower Body Dressing Training:     · Level of [place]	independent    Toilet Hygiene Training:     · Level of [place]	independent    Grooming Training:     · Level of [place]	independent    Eating/Self-Feeding Training:     · Level of [place]	independent          Speech Therapy:    -----------------------------------------------------------------------  PAST MEDICAL & SURGICAL HISTORY  Rheumatoid arthritis    Atrial fibrillation and flutter    Paroxysmal atrial fibrillation    Scleritis    HTN (hypertension)    Benign essential hypertension    Benign essential HTN    HADLEY (obstructive sleep apnea)    Childhood asthma    H/O cardiac radiofrequency ablation    H/O cardiac radiofrequency ablation      FAMILY HISTORY   FH: heart disease (Father)    FH: stroke (Mother)      SOCIAL HISTORY  As above  -----------------------------------------------------------------------  VITALS  T(C): 36.6 (11-08-24 @ 10:09), Max: 36.9 (11-07-24 @ 14:24)  HR: 84 (11-08-24 @ 09:30) (73 - 85)  BP: 144/86 (11-08-24 @ 09:30) (120/69 - 153/88)  RR: 21 (11-08-24 @ 09:30) (16 - 23)  SpO2: 97% (11-08-24 @ 09:30) (95% - 99%)  Wt(kg): --    PHYSICAL EXAM  Constitutional - NAD, Comfortable  HEENT - NCAT  Neck - Supple, No limited ROM  Chest - Breathing comfortably, No Respiratory distress  Cardiovascular - Regular pulse  Abdomen - No visible abdominal distension  Extremities - No deformities of upper or lower limbs. No calf tenderness   MSK - ROM within functional limits  Neurologic Exam -                    Cognitive - Awake, Alert, AAO to self, place, date, year, situation; follows commands     Communication - Fluent, No dysarthria, repetition intact, attention/concentration intact     Cranial Nerves -  EOMI, No facial asymmetry     Motor -                     LEFT    UE - ShAB 5/5, EF 5/5, EE 5/5, WE 5/5,  5/5                    LEFT    LE - HF 5/5, KE 5/5, DF 5/5, PF 5/5                    RIGHT UE - ShAB 5/5, EF 5/5, EE 5/5, WE 5/5,  5/5                    RIGHT LE - HF 5/5, KE 5/5, DF 5/5, PF 5/5        Sensory - grossly intact to LT     Reflexes - no clonus     Coordination - FTN intact  Psychiatric - Mood stable, Affect WNL   -----------------------------------------------------------------------  RECENT LABS  CBC Full  -  ( 08 Nov 2024 07:27 )  WBC Count : 3.85 K/uL  RBC Count : 5.25 M/uL  Hemoglobin : 14.5 g/dL  Hematocrit : 45.0 %  Platelet Count - Automated : 305 K/uL  Mean Cell Volume : 85.7 fl  Mean Cell Hemoglobin : 27.6 pg  Mean Cell Hemoglobin Concentration : 32.2 g/dL  Auto Neutrophil # : x  Auto Lymphocyte # : x  Auto Monocyte # : x  Auto Eosinophil # : x  Auto Basophil # : x  Auto Neutrophil % : x  Auto Lymphocyte % : x  Auto Monocyte % : x  Auto Eosinophil % : x  Auto Basophil % : x    11-08    139  |  104  |  19  ----------------------------<  94  4.2   |  24  |  1.14    Ca    9.0      08 Nov 2024 07:27  Phos  4.0     11-08  Mg     2.2     11-08    TPro  7.9  /  Alb  4.0  /  TBili  0.5  /  DBili  x   /  AST  29  /  ALT  39  /  AlkPhos  102  11-07    Urinalysis Basic - ( 08 Nov 2024 07:27 )    Color: x / Appearance: x / SG: x / pH: x  Gluc: 94 mg/dL / Ketone: x  / Bili: x / Urobili: x   Blood: x / Protein: x / Nitrite: x   Leuk Esterase: x / RBC: x / WBC x   Sq Epi: x / Non Sq Epi: x / Bacteria: x      -----------------------------------------------------------------------  IMAGING:  < from: MR Head No Cont (11.07.24 @ 20:41) >  The calvarium appears intact. The orbits appear unremarkable.    IMPRESSION: Acute/subacuteleft-sided posterior insular cortex infarction   with associated cytotoxic edema. No associated hemorrhage.    < end of copied text >    -----------------------------------------------------------------------  ALLERGIES  losartan (Other)      MEDICATIONS   acetaminophen     Tablet .. 650 milliGRAM(s) Oral every 6 hours PRN  apixaban 5 milliGRAM(s) Oral every 12 hours  aspirin enteric coated 81 milliGRAM(s) Oral daily  atorvastatin 80 milliGRAM(s) Oral at bedtime  influenza   Vaccine 0.5 milliLiter(s) IntraMuscular once  mycophenolate mofetil 1500 milliGRAM(s) Oral daily  mycophenolate mofetil 1000 milliGRAM(s) Oral at bedtime  predniSONE   Tablet 6 milliGRAM(s) Oral daily    -----------------------------------------------------------------------

## 2024-11-08 NOTE — DISCHARGE NOTE PROVIDER - PROVIDER TOKENS
PROVIDER:[[id number]],SCHEDULEDAPPT:[02/10/2025],SCHEDULEDAPPTTIME:[09:00 AM]],PROVIDER:[[id number]],FOLLOWUP:[2 weeks]],PROVIDER:[TOKEN:[[id number]]] PROVIDER:[TOKEN:[[id number]],SCHEDULEDAPPT:[11/21/2024],SCHEDULEDAPPTTIME:[01:00 PM]],PROVIDER:[TOKEN:[[id number]],FOLLOWUP:[2 weeks]],PROVIDER:[TOKEN:[[id number]]] PROVIDER:[TOKEN:[[id number]],FOLLOWUP:[2 weeks]],PROVIDER:[TOKEN:[[id number]]],PROVIDER:[[id number]],SCHEDULEDAPPT:[11/12/2024],SCHEDULEDAPPTTIME:[10:00 AM]]

## 2024-11-08 NOTE — SPEECH LANGUAGE PATHOLOGY EVALUATION - COMMENTS
[facility] Outpatient speech pathology: [phone] Refer WAB-bedside DDK tasks (sequential and alternating): slow, precise speech, and normal rate    No jami dysarthria noted in conversation. WNL Spontaneous Speech  Content: 10/10  Fluency: 9/10    Repetition: 10/10    Object Namin.5/10  (phonemic paraphasias - verbalized "column" for "collar")     Bedside Aphasia Score: 97.5/100  The Bedside Aphasia Score is an essential summary value of an individual's aphasic deficit. It is proportional to the severity of aphasia regardless of the type or etiology. The aphasia quotient is useful in assisting and distinguishing between aphasic and non-aphasic patients. An AQ of 0-25 is very severe; an AQ of 26-50 is severe; an AQ of 51-75 is moderate; and an AQ of 76 and above is mild.  The patient may be considered normal or not aphasic if the aphasia quotient is 93.8 or above.     Reading: DNT/10  Writin/10  (similar spelling/word finding error as with object naming - wrote "sp" for "grass") Western Aphasia Battery Revised Beside Version:   The Western Aphasia Battery-Revised (WAB-R) Beside Version was administered. The “bedside” WAB-R is a shortened version of the Western Aphasia Battery—Revised.  The battery is used to assess a patient’s language function following stroke, dementia, or other acquired neurologic disorder.  The results of the battery provide diagnostic information as to the presence, severity, and type of aphasia. It is designed to be administered at a patient’s bedside, when there are time constraints, issues of cooperation, or comorbidities that prevent more comprehensive testing.  The following results were obtained:    Auditory Verbal Comprehension  Yes/No Questions: 10/10  Sequential Commands: 10/10 PLOF: Pt reported Master's degree of Engineering. He currently works as an . His other language is Kisw[place]ili. He is R handed.  He reported independence in money/medication management, independence in ADLS, and actively driving.    Oral motor exam: WNL

## 2024-11-08 NOTE — CONSULT NOTE ADULT - SUBJECTIVE AND OBJECTIVE BOX
Surgeon: Dr. Lyons    Requesting Physician: Dr. Brar    HISTORY OF PRESENT ILLNESS:  54y Male with PMHx of scleritis and unilateral-alternating/ inflammatory arthropathy without definitive diagnosis (follows with Rheum Dr. Ferrari) on MMF/prednisone, paroxysmal atrial fibrillation/flutter s/p ablation x 3 (most recently 10/24, compliant with Eliquis 2.5 BID), HADLEY, and HTN, who presented to [facility] with dysarthria between 9-10am on 11/7. Patient reports that he woke that morning and then suddenly he felt dizzy, had difficulty understanding people, and was very slow to communicate. MRI confirmed L insular acute/subacute infarction. TTE w/ bubble done and confirmed PFO and structural heart team was consulted for evaluation.    PAST MEDICAL & SURGICAL HISTORY:  Rheumatoid arthritis      Atrial fibrillation and flutter      Paroxysmal atrial fibrillation      Scleritis      HTN (hypertension)      Benign essential hypertension      HADLEY (obstructive sleep apnea)      Childhood asthma      H/O cardiac radiofrequency ablation      H/O cardiac radiofrequency ablation          MEDICATIONS  (STANDING):  apixaban 5 milliGRAM(s) Oral every 12 hours  aspirin enteric coated 81 milliGRAM(s) Oral daily  atorvastatin 80 milliGRAM(s) Oral at bedtime  influenza   Vaccine 0.5 milliLiter(s) IntraMuscular once  mycophenolate mofetil 1000 milliGRAM(s) Oral at bedtime  mycophenolate mofetil 1500 milliGRAM(s) Oral daily  predniSONE   Tablet 6 milliGRAM(s) Oral daily    MEDICATIONS  (PRN):  acetaminophen     Tablet .. 650 milliGRAM(s) Oral every 6 hours PRN Mild Pain (1 - 3)      Allergies    losartan (Other)    Intolerances    SOCIAL HISTORY:  Smoker:  denies  ETOH use: denies  Ilicit Drug use: denies    FAMILY HISTORY:  FH: heart disease (Father)    FH: stroke (Mother)        Review of Systems:  CONSTITUTIONAL: Denies fevers / chills, sweats, fatigue, weight loss, weight gain                                       NEURO:  Denies parathesias, seizures, syncope                                                                               EYES:  Denies blurry vision, discharge, pain, loss of vision                                                                                    ENMT:  Denies difficulty hearing, vertigo, dysphagia, epistaxis, recent dental work                                       CV:  Denies chest pain, palpitations, ZHAO, orthopnea                                                                                           RESPIRATORY:  Denies wheezing, SOB, cough / sputum, hemoptysis                                                               GI:  Denies nausea, vomiting, diarrhea, constipation, melena                                                                          : Denies hematuria, dysuria, urgency, incontinence                                                                                          MUSKULOSKELETAL:  Denies arthritis, joint swelling, muscle weakness                                                             SKIN/BREAST:  Denies rash, itching, hair loss, masses                                                                                              PSYCH:  Denies depression, anxiety, suicidal ideation                                                                                                HEME/LYMPH:  Denies bruises easily, enlarged lymph nodes, tender lymph nodes                                          ENDOCRINE:  Denies cold intolerance, heat intolerance, polydipsia                                                                      Vital Signs Last 24 Hrs  T(C): 36.6 (08 Nov 2024 14:29), Max: 36.7 (07 Nov 2024 18:08)  T(F): 97.9 (08 Nov 2024 14:29), Max: 98.1 (07 Nov 2024 18:08)  HR: 87 (08 Nov 2024 14:26) (73 - 87)  BP: 137/80 (08 Nov 2024 14:26) (120/69 - 153/88)  BP(mean): 101 (08 Nov 2024 14:26) (86 - 116)  RR: 21 (08 Nov 2024 14:26) (16 - 23)  SpO2: 97% (08 Nov 2024 14:26) (95% - 99%)    Parameters below as of 08 Nov 2024 14:26  Patient On (Oxygen Delivery Method): room air    Physical Exam (Need 8)  CONSTITUTIONAL:                                                                          WNL  NEURO:                                                                                             WNL                      EYES:                                                                                                  WNL  ENMT:                                                                                                WNL  CV:                                                                                                      WNL  RESPIRATORY:                                                                                  WNL  GI:                                                                                                       WNL  : OSMAN + / -                                                                                 WNL  MUSKULOSKELETAL:                                                                       WNL  SKIN / BREAST:                                                                                 WNL                                                          LABS:                        14.5   3.85  )-----------( 305      ( 08 Nov 2024 07:27 )             45.0     11-08    139  |  104  |  19  ----------------------------<  94  4.2   |  24  |  1.14    Ca    9.0      08 Nov 2024 07:27  Phos  4.0     11-08  Mg     2.2     11-08    TPro  7.9  /  Alb  4.0  /  TBili  0.5  /  DBili  x   /  AST  29  /  ALT  39  /  AlkPhos  102  11-07    PT/INR - ( 07 Nov 2024 13:02 )   PT: 14.1 sec;   INR: 1.22          PTT - ( 07 Nov 2024 13:02 )  PTT:34.6 sec  Urinalysis Basic - ( 08 Nov 2024 07:27 )    Color: x / Appearance: x / SG: x / pH: x  Gluc: 94 mg/dL / Ketone: x  / Bili: x / Urobili: x   Blood: x / Protein: x / Nitrite: x   Leuk Esterase: x / RBC: x / WBC x   Sq Epi: x / Non Sq Epi: x / Bacteria: x        RADIOLOGY & ADDITIONAL STUDIES:  LE doppler: pending    CT Scan:  < from: CT Angio Neck Stroke Protocol w/ IV Cont (11.07.24 @ 13:29) >  IMPRESSION:    CT of the brain:  No acute intracranial injury  The results were discussed with ROBSON Smith at 1:15 PM on 11/7/2024. This   study was performed on 11/7/2024 at 11: 1:05 PM    CT perfusion:    Tmax greater than 6 seconds: 77 mL.  Mismatch volume: 77 mL.  Mismatch ratio: Infinite      CTA of the intracranial circulation.  Severe narrowing/occlusion of the left M2 inferior division of the middle   cerebral artery  .  No evidence of aneurysm.  Dr. Barreto was informed of the results.    CTA of the extracranial circulation.  No evidence of carotid stenosis.    < end of copied text >    EKG:    TTE / JOHN:  < from: Echocardiogram w/ Bubble and Doppler (11.08.24 @ 10:56) >  CONCLUSIONS:     1. Normal left and right ventricular cavity size and systolic function,   LV EF 63%.   2. Mild symmetric left ventricular hypertrophy.   3. No significant valvular disease.   4. No pericardial effusion.   5. Intracardiac shunt, likely patent foramen ovale.    < end of copied text >   Surgeon: Dr. Lyons    Requesting Physician: Dr. rBar    HISTORY OF PRESENT ILLNESS:  54y Male with PMHx of scleritis and unilateral-alternating/ inflammatory arthropathy without definitive diagnosis (follows with Rheum Dr. Ferrari) on MMF/prednisone, paroxysmal atrial fibrillation/flutter s/p ablation x 3 (most recently 10/24, compliant with Eliquis 2.5 BID), HADLEY, and HTN, who presented to [facility] with dysarthria between 9-10am on 11/7. Patient reports that he woke that morning and then suddenly he felt dizzy, had difficulty understanding people, and was very slow to communicate. MRI confirmed L insular acute/subacute infarction. TTE w/ bubble done and confirmed PFO and structural heart team was consulted for evaluation.    PAST MEDICAL & SURGICAL HISTORY:  Rheumatoid arthritis      Atrial fibrillation and flutter      Paroxysmal atrial fibrillation      Scleritis      HTN (hypertension)      Benign essential hypertension      HADLEY (obstructive sleep apnea)      Childhood asthma      H/O cardiac radiofrequency ablation      H/O cardiac radiofrequency ablation          MEDICATIONS  (STANDING):  apixaban 5 milliGRAM(s) Oral every 12 hours  aspirin enteric coated 81 milliGRAM(s) Oral daily  atorvastatin 80 milliGRAM(s) Oral at bedtime  influenza   Vaccine 0.5 milliLiter(s) IntraMuscular once  mycophenolate mofetil 1000 milliGRAM(s) Oral at bedtime  mycophenolate mofetil 1500 milliGRAM(s) Oral daily  predniSONE   Tablet 6 milliGRAM(s) Oral daily    MEDICATIONS  (PRN):  acetaminophen     Tablet .. 650 milliGRAM(s) Oral every 6 hours PRN Mild Pain (1 - 3)      Allergies    losartan (Other)    Intolerances    SOCIAL HISTORY:  Smoker:  denies  ETOH use: denies  Ilicit Drug use: denies    FAMILY HISTORY:  FH: heart disease (Father)    FH: stroke (Mother)        Review of Systems:  CONSTITUTIONAL: Denies fevers / chills, sweats, fatigue, weight loss, weight gain                                       NEURO:  Denies parathesias, seizures, syncope                                                                               EYES:  Denies blurry vision, discharge, pain, loss of vision                                                                                    ENMT:  Denies difficulty hearing, vertigo, dysphagia, epistaxis, recent dental work                                       CV:  Denies chest pain, palpitations, ZHAO, orthopnea                                                                                           RESPIRATORY:  Denies wheezing, SOB, cough / sputum, hemoptysis                                                               GI:  Denies nausea, vomiting, diarrhea, constipation, melena                                                                          : Denies hematuria, dysuria, urgency, incontinence                                                                                          MUSKULOSKELETAL:  Denies arthritis, joint swelling, muscle weakness                                                             SKIN/BREAST:  Denies rash, itching, hair loss, masses                                                                                              PSYCH:  Denies depression, anxiety, suicidal ideation                                                                                                HEME/LYMPH:  Denies bruises easily, enlarged lymph nodes, tender lymph nodes                                          ENDOCRINE:  Denies cold intolerance, heat intolerance, polydipsia                                                                      Vital Signs Last 24 Hrs  T(C): 36.6 (08 Nov 2024 14:29), Max: 36.7 (07 Nov 2024 18:08)  T(F): 97.9 (08 Nov 2024 14:29), Max: 98.1 (07 Nov 2024 18:08)  HR: 87 (08 Nov 2024 14:26) (73 - 87)  BP: 137/80 (08 Nov 2024 14:26) (120/69 - 153/88)  BP(mean): 101 (08 Nov 2024 14:26) (86 - 116)  RR: 21 (08 Nov 2024 14:26) (16 - 23)  SpO2: 97% (08 Nov 2024 14:26) (95% - 99%)    Parameters below as of 08 Nov 2024 14:26  Patient On (Oxygen Delivery Method): room air    Physical Exam   General: NAD, sitting comfortably in bed  Neurological: alert and oriented  Cardiovascular: RRR  Respiratory: chest expansion symmetrical  Gastrointestinal: ND  Extremities: moving spontaneously  Vascular: warm, well perfused.   Skin: no obvious rashes noted  Incisions: none                                                          LABS:                        14.5   3.85  )-----------( 305      ( 08 Nov 2024 07:27 )             45.0     11-08    139  |  104  |  19  ----------------------------<  94  4.2   |  24  |  1.14    Ca    9.0      08 Nov 2024 07:27  Phos  4.0     11-08  Mg     2.2     11-08    TPro  7.9  /  Alb  4.0  /  TBili  0.5  /  DBili  x   /  AST  29  /  ALT  39  /  AlkPhos  102  11-07    PT/INR - ( 07 Nov 2024 13:02 )   PT: 14.1 sec;   INR: 1.22          PTT - ( 07 Nov 2024 13:02 )  PTT:34.6 sec  Urinalysis Basic - ( 08 Nov 2024 07:27 )    Color: x / Appearance: x / SG: x / pH: x  Gluc: 94 mg/dL / Ketone: x  / Bili: x / Urobili: x   Blood: x / Protein: x / Nitrite: x   Leuk Esterase: x / RBC: x / WBC x   Sq Epi: x / Non Sq Epi: x / Bacteria: x        RADIOLOGY & ADDITIONAL STUDIES:  LE doppler: pending    CT Scan:  < from: CT Angio Neck Stroke Protocol w/ IV Cont (11.07.24 @ 13:29) >  IMPRESSION:    CT of the brain:  No acute intracranial injury  The results were discussed with ROBSON Smith at 1:15 PM on 11/7/2024. This   study was performed on 11/7/2024 at 11: 1:05 PM    CT perfusion:    Tmax greater than 6 seconds: 77 mL.  Mismatch volume: 77 mL.  Mismatch ratio: Infinite      CTA of the intracranial circulation.  Severe narrowing/occlusion of the left M2 inferior division of the middle   cerebral artery  .  No evidence of aneurysm.  Dr. Barreto was informed of the results.    CTA of the extracranial circulation.  No evidence of carotid stenosis.    < end of copied text >    EKG:    TTE / JOHN:  < from: Echocardiogram w/ Bubble and Doppler (11.08.24 @ 10:56) >  CONCLUSIONS:     1. Normal left and right ventricular cavity size and systolic function,   LV EF 63%.   2. Mild symmetric left ventricular hypertrophy.   3. No significant valvular disease.   4. No pericardial effusion.   5. Intracardiac shunt, likely patent foramen ovale.    < end of copied text >

## 2024-11-09 PROBLEM — J45.909 UNSPECIFIED ASTHMA, UNCOMPLICATED: Chronic | Status: ACTIVE | Noted: 2024-10-17

## 2024-11-09 PROBLEM — G47.33 OBSTRUCTIVE SLEEP APNEA (ADULT) (PEDIATRIC): Chronic | Status: ACTIVE | Noted: 2024-10-17

## 2024-11-09 PROBLEM — H15.009 UNSPECIFIED SCLERITIS, UNSPECIFIED EYE: Chronic | Status: ACTIVE | Noted: 2024-10-17

## 2024-11-09 PROBLEM — I10 ESSENTIAL (PRIMARY) HYPERTENSION: Chronic | Status: ACTIVE | Noted: 2024-10-17

## 2024-11-09 LAB
B2 GLYCOPROT1 IGA SER QL: <2 U/ML — SIGNIFICANT CHANGE UP
B2 GLYCOPROT1 IGG SER-ACNC: <1.4 U/ML — SIGNIFICANT CHANGE UP
B2 GLYCOPROT1 IGM SER-ACNC: <1.5 U/ML — SIGNIFICANT CHANGE UP
CARDIOLIPIN IGM SER-MCNC: <1.5 MPL U/ML — SIGNIFICANT CHANGE UP
CARDIOLIPIN IGM SER-MCNC: <1.6 GPL U/ML — SIGNIFICANT CHANGE UP
DEPRECATED CARDIOLIPIN IGA SER: <2 APL U/ML — SIGNIFICANT CHANGE UP
HCYS SERPL-MCNC: 15.7 UMOL/L — HIGH

## 2024-11-11 LAB
AT III ACT/NOR PPP CHRO: 120 % — SIGNIFICANT CHANGE UP (ref 85–135)
PROT C ACT/NOR PPP: 112 % — SIGNIFICANT CHANGE UP (ref 74–150)
PROT S FREE PPP-ACNC: 127 % — SIGNIFICANT CHANGE UP (ref 63–140)

## 2024-11-12 ENCOUNTER — APPOINTMENT (OUTPATIENT)
Dept: NEUROLOGY | Facility: CLINIC | Age: 54
End: 2024-11-12
Payer: COMMERCIAL

## 2024-11-12 VITALS
WEIGHT: 239 LBS | HEIGHT: 71 IN | OXYGEN SATURATION: 97 % | SYSTOLIC BLOOD PRESSURE: 135 MMHG | BODY MASS INDEX: 33.46 KG/M2 | HEART RATE: 79 BPM | DIASTOLIC BLOOD PRESSURE: 87 MMHG

## 2024-11-12 DIAGNOSIS — Q21.12 PATENT FORAMEN OVALE: ICD-10-CM

## 2024-11-12 LAB — APCR PPP: 2.68 RATIO — SIGNIFICANT CHANGE UP

## 2024-11-12 PROCEDURE — 99205 OFFICE O/P NEW HI 60 MIN: CPT

## 2024-11-12 RX ORDER — CALCIUM CITRATE/VITAMIN D3 315MG-6.25
TABLET ORAL
Refills: 0 | Status: ACTIVE | COMMUNITY

## 2024-11-12 RX ORDER — ATORVASTATIN CALCIUM 80 MG/1
80 TABLET, FILM COATED ORAL
Qty: 90 | Refills: 1 | Status: ACTIVE | COMMUNITY
Start: 2024-11-12 | End: 1900-01-01

## 2024-11-12 RX ORDER — ASPIRIN ENTERIC COATED TABLETS 81 MG 81 MG/1
81 TABLET, DELAYED RELEASE ORAL DAILY
Qty: 90 | Refills: 3 | Status: ACTIVE | COMMUNITY
Start: 2024-11-12 | End: 1900-01-01

## 2024-11-12 RX ORDER — APIXABAN 5 MG/1
5 TABLET, FILM COATED ORAL
Qty: 180 | Refills: 3 | Status: ACTIVE | COMMUNITY
Start: 2024-11-12 | End: 1900-01-01

## 2024-11-12 RX ORDER — CHOLECALCIFEROL (VITAMIN D3) 25 MCG
TABLET ORAL
Refills: 0 | Status: ACTIVE | COMMUNITY

## 2024-11-14 ENCOUNTER — NON-APPOINTMENT (OUTPATIENT)
Age: 54
End: 2024-11-14

## 2024-11-14 ENCOUNTER — APPOINTMENT (OUTPATIENT)
Dept: ELECTROPHYSIOLOGY | Facility: CLINIC | Age: 54
End: 2024-11-14
Payer: COMMERCIAL

## 2024-11-14 VITALS
TEMPERATURE: 98.7 F | DIASTOLIC BLOOD PRESSURE: 80 MMHG | WEIGHT: 239 LBS | OXYGEN SATURATION: 97 % | BODY MASS INDEX: 33.46 KG/M2 | HEART RATE: 75 BPM | SYSTOLIC BLOOD PRESSURE: 122 MMHG | RESPIRATION RATE: 16 BRPM | HEIGHT: 71 IN

## 2024-11-14 DIAGNOSIS — Z98.890 OTHER SPECIFIED POSTPROCEDURAL STATES: ICD-10-CM

## 2024-11-14 DIAGNOSIS — I48.0 PAROXYSMAL ATRIAL FIBRILLATION: ICD-10-CM

## 2024-11-14 DIAGNOSIS — Z86.79 OTHER SPECIFIED POSTPROCEDURAL STATES: ICD-10-CM

## 2024-11-14 DIAGNOSIS — I63.512 CEREBRAL INFARCTION DUE TO UNSPECIFIED OCCLUSION OR STENOSIS OF LEFT MIDDLE CEREBRAL ARTERY: ICD-10-CM

## 2024-11-14 DIAGNOSIS — I10 ESSENTIAL (PRIMARY) HYPERTENSION: ICD-10-CM

## 2024-11-14 LAB
DNA PLOIDY SPEC FC-IMP: SIGNIFICANT CHANGE UP
PTR INTERPRETATION: SIGNIFICANT CHANGE UP

## 2024-11-14 PROCEDURE — 93000 ELECTROCARDIOGRAM COMPLETE: CPT

## 2024-11-14 PROCEDURE — 99214 OFFICE O/P EST MOD 30 MIN: CPT

## 2024-11-18 DIAGNOSIS — Z88.8 ALLERGY STATUS TO OTHER DRUGS, MEDICAMENTS AND BIOLOGICAL SUBSTANCES: ICD-10-CM

## 2024-11-18 DIAGNOSIS — Z79.52 LONG TERM (CURRENT) USE OF SYSTEMIC STEROIDS: ICD-10-CM

## 2024-11-18 DIAGNOSIS — R29.701 NIHSS SCORE 1: ICD-10-CM

## 2024-11-18 DIAGNOSIS — G47.33 OBSTRUCTIVE SLEEP APNEA (ADULT) (PEDIATRIC): ICD-10-CM

## 2024-11-18 DIAGNOSIS — R47.1 DYSARTHRIA AND ANARTHRIA: ICD-10-CM

## 2024-11-18 DIAGNOSIS — I10 ESSENTIAL (PRIMARY) HYPERTENSION: ICD-10-CM

## 2024-11-18 DIAGNOSIS — I63.512 CEREBRAL INFARCTION DUE TO UNSPECIFIED OCCLUSION OR STENOSIS OF LEFT MIDDLE CEREBRAL ARTERY: ICD-10-CM

## 2024-11-18 DIAGNOSIS — M06.9 RHEUMATOID ARTHRITIS, UNSPECIFIED: ICD-10-CM

## 2024-11-18 DIAGNOSIS — Z79.01 LONG TERM (CURRENT) USE OF ANTICOAGULANTS: ICD-10-CM

## 2024-11-18 DIAGNOSIS — H15.009 UNSPECIFIED SCLERITIS, UNSPECIFIED EYE: ICD-10-CM

## 2024-11-18 DIAGNOSIS — I48.0 PAROXYSMAL ATRIAL FIBRILLATION: ICD-10-CM

## 2024-11-18 DIAGNOSIS — Q21.12 PATENT FORAMEN OVALE: ICD-10-CM

## 2024-11-21 ENCOUNTER — APPOINTMENT (OUTPATIENT)
Dept: NEUROLOGY | Facility: CLINIC | Age: 54
End: 2024-11-21

## 2024-11-22 ENCOUNTER — APPOINTMENT (OUTPATIENT)
Dept: NEUROLOGY | Facility: CLINIC | Age: 54
End: 2024-11-22

## 2024-11-22 PROCEDURE — 93880 EXTRACRANIAL BILAT STUDY: CPT

## 2024-11-22 PROCEDURE — 93886 INTRACRANIAL COMPLETE STUDY: CPT

## 2024-11-25 ENCOUNTER — APPOINTMENT (OUTPATIENT)
Dept: CARDIOTHORACIC SURGERY | Facility: CLINIC | Age: 54
End: 2024-11-25

## 2024-12-03 ENCOUNTER — NON-APPOINTMENT (OUTPATIENT)
Age: 54
End: 2024-12-03

## 2024-12-03 ENCOUNTER — APPOINTMENT (OUTPATIENT)
Dept: OPHTHALMOLOGY | Facility: CLINIC | Age: 54
End: 2024-12-03
Payer: COMMERCIAL

## 2024-12-03 PROCEDURE — 92014 COMPRE OPH EXAM EST PT 1/>: CPT

## 2024-12-09 ENCOUNTER — NON-APPOINTMENT (OUTPATIENT)
Age: 54
End: 2024-12-09

## 2024-12-14 ENCOUNTER — APPOINTMENT (OUTPATIENT)
Dept: INTERNAL MEDICINE | Facility: CLINIC | Age: 54
End: 2024-12-14
Payer: COMMERCIAL

## 2024-12-14 VITALS
BODY MASS INDEX: 33.4 KG/M2 | OXYGEN SATURATION: 97 % | HEIGHT: 71 IN | HEART RATE: 76 BPM | WEIGHT: 238.6 LBS | TEMPERATURE: 97.1 F

## 2024-12-14 VITALS — DIASTOLIC BLOOD PRESSURE: 78 MMHG | SYSTOLIC BLOOD PRESSURE: 124 MMHG

## 2024-12-14 DIAGNOSIS — Z82.5 FAMILY HISTORY OF ASTHMA AND OTHER CHRONIC LOWER RESPIRATORY DISEASES: ICD-10-CM

## 2024-12-14 DIAGNOSIS — Z82.49 FAMILY HISTORY OF ISCHEMIC HEART DISEASE AND OTHER DISEASES OF THE CIRCULATORY SYSTEM: ICD-10-CM

## 2024-12-14 DIAGNOSIS — Z82.3 FAMILY HISTORY OF STROKE: ICD-10-CM

## 2024-12-14 DIAGNOSIS — Z86.79 OTHER SPECIFIED POSTPROCEDURAL STATES: ICD-10-CM

## 2024-12-14 DIAGNOSIS — Q21.12 PATENT FORAMEN OVALE: ICD-10-CM

## 2024-12-14 DIAGNOSIS — I10 ESSENTIAL (PRIMARY) HYPERTENSION: ICD-10-CM

## 2024-12-14 DIAGNOSIS — Z98.890 OTHER SPECIFIED POSTPROCEDURAL STATES: ICD-10-CM

## 2024-12-14 DIAGNOSIS — Z80.9 FAMILY HISTORY OF MALIGNANT NEOPLASM, UNSPECIFIED: ICD-10-CM

## 2024-12-14 DIAGNOSIS — R41.89 OTHER SYMPTOMS AND SIGNS INVOLVING COGNITIVE FUNCTIONS AND AWARENESS: ICD-10-CM

## 2024-12-14 DIAGNOSIS — I48.0 PAROXYSMAL ATRIAL FIBRILLATION: ICD-10-CM

## 2024-12-14 DIAGNOSIS — Z83.3 FAMILY HISTORY OF DIABETES MELLITUS: ICD-10-CM

## 2024-12-14 DIAGNOSIS — Z80.42 FAMILY HISTORY OF MALIGNANT NEOPLASM OF PROSTATE: ICD-10-CM

## 2024-12-14 DIAGNOSIS — H15.009 UNSPECIFIED SCLERITIS, UNSPECIFIED EYE: ICD-10-CM

## 2024-12-14 PROCEDURE — G2211 COMPLEX E/M VISIT ADD ON: CPT

## 2024-12-14 PROCEDURE — 36415 COLL VENOUS BLD VENIPUNCTURE: CPT

## 2024-12-14 PROCEDURE — 99204 OFFICE O/P NEW MOD 45 MIN: CPT

## 2024-12-16 DIAGNOSIS — E55.9 VITAMIN D DEFICIENCY, UNSPECIFIED: ICD-10-CM

## 2024-12-16 LAB
25(OH)D3 SERPL-MCNC: 23.8 NG/ML
ALBUMIN SERPL ELPH-MCNC: 4.5 G/DL
ALP BLD-CCNC: 105 U/L
ALT SERPL-CCNC: 28 U/L
ANION GAP SERPL CALC-SCNC: 14 MMOL/L
AST SERPL-CCNC: 22 U/L
BASOPHILS # BLD AUTO: 0.03 K/UL
BASOPHILS NFR BLD AUTO: 0.7 %
BILIRUB SERPL-MCNC: 0.7 MG/DL
BUN SERPL-MCNC: 22 MG/DL
CALCIUM SERPL-MCNC: 9.8 MG/DL
CHLORIDE SERPL-SCNC: 101 MMOL/L
CHOLEST SERPL-MCNC: 100 MG/DL
CO2 SERPL-SCNC: 25 MMOL/L
CREAT SERPL-MCNC: 1.04 MG/DL
EGFR: 85 ML/MIN/1.73M2
EOSINOPHIL # BLD AUTO: 0.16 K/UL
EOSINOPHIL NFR BLD AUTO: 3.8 %
FOLATE SERPL-MCNC: 11.6 NG/ML
GLUCOSE SERPL-MCNC: 101 MG/DL
HCT VFR BLD CALC: 46.7 %
HDLC SERPL-MCNC: 40 MG/DL
HGB BLD-MCNC: 14.6 G/DL
IMM GRANULOCYTES NFR BLD AUTO: 0.2 %
LDLC SERPL CALC-MCNC: 43 MG/DL
LYMPHOCYTES # BLD AUTO: 1.74 K/UL
LYMPHOCYTES NFR BLD AUTO: 41.3 %
MAN DIFF?: NORMAL
MCHC RBC-ENTMCNC: 27.4 PG
MCHC RBC-ENTMCNC: 31.3 G/DL
MCV RBC AUTO: 87.6 FL
MONOCYTES # BLD AUTO: 0.51 K/UL
MONOCYTES NFR BLD AUTO: 12.1 %
NEUTROPHILS # BLD AUTO: 1.76 K/UL
NEUTROPHILS NFR BLD AUTO: 41.9 %
NONHDLC SERPL-MCNC: 61 MG/DL
PLATELET # BLD AUTO: 317 K/UL
POTASSIUM SERPL-SCNC: 4.7 MMOL/L
PROT SERPL-MCNC: 7.6 G/DL
RBC # BLD: 5.33 M/UL
RBC # FLD: 12.8 %
SODIUM SERPL-SCNC: 140 MMOL/L
TRIGL SERPL-MCNC: 87 MG/DL
VIT B12 SERPL-MCNC: 454 PG/ML
WBC # FLD AUTO: 4.21 K/UL

## 2024-12-17 ENCOUNTER — APPOINTMENT (OUTPATIENT)
Dept: HEART AND VASCULAR | Facility: CLINIC | Age: 54
End: 2024-12-17

## 2024-12-17 ENCOUNTER — APPOINTMENT (OUTPATIENT)
Dept: HEMATOLOGY ONCOLOGY | Facility: CLINIC | Age: 54
End: 2024-12-17
Payer: COMMERCIAL

## 2024-12-17 VITALS
BODY MASS INDEX: 33.18 KG/M2 | OXYGEN SATURATION: 96 % | SYSTOLIC BLOOD PRESSURE: 112 MMHG | DIASTOLIC BLOOD PRESSURE: 75 MMHG | WEIGHT: 237 LBS | RESPIRATION RATE: 18 BRPM | TEMPERATURE: 98.6 F | HEIGHT: 71 IN | HEART RATE: 75 BPM

## 2024-12-17 VITALS
BODY MASS INDEX: 33.18 KG/M2 | HEART RATE: 76 BPM | OXYGEN SATURATION: 96 % | SYSTOLIC BLOOD PRESSURE: 121 MMHG | DIASTOLIC BLOOD PRESSURE: 89 MMHG | WEIGHT: 237 LBS | HEIGHT: 71 IN

## 2024-12-17 DIAGNOSIS — I63.512 CEREBRAL INFARCTION DUE TO UNSPECIFIED OCCLUSION OR STENOSIS OF LEFT MIDDLE CEREBRAL ARTERY: ICD-10-CM

## 2024-12-17 LAB
BASOPHILS # BLD AUTO: 0.04 K/UL
BASOPHILS NFR BLD AUTO: 0.9 %
EOSINOPHIL # BLD AUTO: 0.14 K/UL
EOSINOPHIL NFR BLD AUTO: 3.2 %
HCT VFR BLD CALC: 44.2 %
HGB BLD-MCNC: 14.1 G/DL
IMM GRANULOCYTES NFR BLD AUTO: 0.2 %
LYMPHOCYTES # BLD AUTO: 1.96 K/UL
LYMPHOCYTES NFR BLD AUTO: 44.6 %
MAN DIFF?: NORMAL
MCHC RBC-ENTMCNC: 26.8 PG
MCHC RBC-ENTMCNC: 31.9 G/DL
MCV RBC AUTO: 83.9 FL
MONOCYTES # BLD AUTO: 0.52 K/UL
MONOCYTES NFR BLD AUTO: 11.8 %
NEUTROPHILS # BLD AUTO: 1.72 K/UL
NEUTROPHILS NFR BLD AUTO: 39.3 %
PLATELET # BLD AUTO: 328 K/UL
PMV BLD AUTO: 0 /100 WBCS
RBC # BLD: 5.27 M/UL
RBC # FLD: 12.8 %
WBC # FLD AUTO: 4.39 K/UL

## 2024-12-17 PROCEDURE — 99213 OFFICE O/P EST LOW 20 MIN: CPT

## 2024-12-17 PROCEDURE — 99214 OFFICE O/P EST MOD 30 MIN: CPT

## 2024-12-19 LAB
APTT BLD: 33.6 SEC
INR PPP: 1.16
PT BLD: 13.3 SEC

## 2025-01-11 ENCOUNTER — APPOINTMENT (OUTPATIENT)
Dept: INTERNAL MEDICINE | Facility: CLINIC | Age: 55
End: 2025-01-11
Payer: COMMERCIAL

## 2025-01-11 VITALS
HEART RATE: 76 BPM | BODY MASS INDEX: 33.32 KG/M2 | TEMPERATURE: 98 F | WEIGHT: 238 LBS | HEIGHT: 71 IN | RESPIRATION RATE: 16 BRPM | OXYGEN SATURATION: 96 %

## 2025-01-11 VITALS — SYSTOLIC BLOOD PRESSURE: 110 MMHG | DIASTOLIC BLOOD PRESSURE: 72 MMHG

## 2025-01-11 DIAGNOSIS — I48.4 ATYPICAL ATRIAL FLUTTER: ICD-10-CM

## 2025-01-11 PROBLEM — Z86.69 HISTORY OF BLURRY VISION: Status: ACTIVE | Noted: 2025-01-11

## 2025-01-11 PROBLEM — R51.9 HEADACHE: Status: ACTIVE | Noted: 2025-01-11

## 2025-01-11 PROCEDURE — G2211 COMPLEX E/M VISIT ADD ON: CPT

## 2025-01-11 PROCEDURE — 99214 OFFICE O/P EST MOD 30 MIN: CPT

## 2025-01-16 ENCOUNTER — APPOINTMENT (OUTPATIENT)
Dept: NEUROLOGY | Facility: CLINIC | Age: 55
End: 2025-01-16
Payer: COMMERCIAL

## 2025-01-16 DIAGNOSIS — I48.0 PAROXYSMAL ATRIAL FIBRILLATION: ICD-10-CM

## 2025-01-16 DIAGNOSIS — I63.512 CEREBRAL INFARCTION DUE TO UNSPECIFIED OCCLUSION OR STENOSIS OF LEFT MIDDLE CEREBRAL ARTERY: ICD-10-CM

## 2025-01-16 DIAGNOSIS — E53.8 DEFICIENCY OF OTHER SPECIFIED B GROUP VITAMINS: ICD-10-CM

## 2025-01-16 DIAGNOSIS — Z86.69 PERSONAL HISTORY OF OTHER DISEASES OF THE NERVOUS SYSTEM AND SENSE ORGANS: ICD-10-CM

## 2025-01-16 DIAGNOSIS — I10 ESSENTIAL (PRIMARY) HYPERTENSION: ICD-10-CM

## 2025-01-16 DIAGNOSIS — R51.9 HEADACHE, UNSPECIFIED: ICD-10-CM

## 2025-01-16 DIAGNOSIS — Q21.12 PATENT FORAMEN OVALE: ICD-10-CM

## 2025-01-16 PROCEDURE — 99215 OFFICE O/P EST HI 40 MIN: CPT

## 2025-01-16 RX ORDER — NORTRIPTYLINE HYDROCHLORIDE 10 MG/1
10 CAPSULE ORAL
Qty: 60 | Refills: 1 | Status: ACTIVE | COMMUNITY
Start: 2025-01-16 | End: 1900-01-01

## 2025-01-24 ENCOUNTER — RESULT REVIEW (OUTPATIENT)
Age: 55
End: 2025-01-24

## 2025-01-24 ENCOUNTER — APPOINTMENT (OUTPATIENT)
Dept: CV DIAGNOSITCS | Facility: HOSPITAL | Age: 55
End: 2025-01-24

## 2025-01-24 ENCOUNTER — OUTPATIENT (OUTPATIENT)
Dept: OUTPATIENT SERVICES | Facility: HOSPITAL | Age: 55
LOS: 1 days | End: 2025-01-24
Payer: COMMERCIAL

## 2025-01-24 ENCOUNTER — TRANSCRIPTION ENCOUNTER (OUTPATIENT)
Age: 55
End: 2025-01-24

## 2025-01-24 VITALS
RESPIRATION RATE: 16 BRPM | HEART RATE: 75 BPM | OXYGEN SATURATION: 93 % | DIASTOLIC BLOOD PRESSURE: 62 MMHG | SYSTOLIC BLOOD PRESSURE: 103 MMHG | TEMPERATURE: 98 F

## 2025-01-24 VITALS
DIASTOLIC BLOOD PRESSURE: 61 MMHG | OXYGEN SATURATION: 96 % | SYSTOLIC BLOOD PRESSURE: 114 MMHG | HEART RATE: 69 BPM | RESPIRATION RATE: 15 BRPM

## 2025-01-24 DIAGNOSIS — Z98.890 OTHER SPECIFIED POSTPROCEDURAL STATES: Chronic | ICD-10-CM

## 2025-01-24 DIAGNOSIS — Q21.12 PATENT FORAMEN OVALE: ICD-10-CM

## 2025-01-24 PROCEDURE — 93320 DOPPLER ECHO COMPLETE: CPT | Mod: 26

## 2025-01-24 PROCEDURE — 93312 ECHO TRANSESOPHAGEAL: CPT

## 2025-01-24 PROCEDURE — 93320 DOPPLER ECHO COMPLETE: CPT

## 2025-01-24 PROCEDURE — 93325 DOPPLER ECHO COLOR FLOW MAPG: CPT | Mod: 26

## 2025-01-24 PROCEDURE — 93325 DOPPLER ECHO COLOR FLOW MAPG: CPT

## 2025-01-24 PROCEDURE — 93312 ECHO TRANSESOPHAGEAL: CPT | Mod: 26

## 2025-01-24 RX ORDER — ATORVASTATIN CALCIUM 80 MG/1
1 TABLET, FILM COATED ORAL
Qty: 0 | Refills: 0 | DISCHARGE
Start: 2025-01-24

## 2025-01-24 RX ORDER — PANTOPRAZOLE 20 MG/1
1 TABLET, DELAYED RELEASE ORAL
Qty: 0 | Refills: 0 | DISCHARGE
Start: 2025-01-24

## 2025-01-24 RX ORDER — ASPIRIN 81 MG/1
1 TABLET, COATED ORAL
Qty: 0 | Refills: 0 | DISCHARGE
Start: 2025-01-24

## 2025-01-24 RX ORDER — METOPROLOL SUCCINATE 25 MG
1 TABLET, EXTENDED RELEASE 24 HR ORAL
Qty: 0 | Refills: 0 | DISCHARGE
Start: 2025-01-24

## 2025-01-24 NOTE — ASU DISCHARGE PLAN (ADULT/PEDIATRIC) - ASU DC SPECIAL INSTRUCTIONSFT
1. NPO for 30 minutes post JOHN until cleared by anesthesia  2. Liquid diet as tolerated after initial 30 minutes, then advance as tolerated to previously prescribed diet   3. Vital signs as per recovery protocol  4. Bedrest till Eulogio greater or equal to 8  5. Discharg from recovery suit when recovered fgrom sedation and cleared by anesthesia

## 2025-01-24 NOTE — ASU DISCHARGE PLAN (ADULT/PEDIATRIC) - CARE PROVIDER_API CALL
Arcelia Lyons  Interventional Cardiology  [address], [address] [address]  Phone: [phone]  Fax: [phone]  Follow Up Time:

## 2025-01-24 NOTE — ASU DISCHARGE PLAN (ADULT/PEDIATRIC) - FINANCIAL ASSISTANCE
[facility] provides services at a reduced cost to those who are determined to be eligible through [facility]’s financial assistance program. Information regarding [facility]’s financial assistance program can be found by going to [email or web address] or by calling [phone].

## 2025-02-06 ENCOUNTER — APPOINTMENT (OUTPATIENT)
Dept: NEUROLOGY | Facility: CLINIC | Age: 55
End: 2025-02-06

## 2025-02-10 ENCOUNTER — APPOINTMENT (OUTPATIENT)
Dept: NEUROLOGY | Facility: CLINIC | Age: 55
End: 2025-02-10

## 2025-02-13 ENCOUNTER — APPOINTMENT (OUTPATIENT)
Dept: NEUROLOGY | Facility: CLINIC | Age: 55
End: 2025-02-13

## 2025-02-13 ENCOUNTER — NON-APPOINTMENT (OUTPATIENT)
Age: 55
End: 2025-02-13

## 2025-02-13 ENCOUNTER — APPOINTMENT (OUTPATIENT)
Dept: ELECTROPHYSIOLOGY | Facility: CLINIC | Age: 55
End: 2025-02-13
Payer: COMMERCIAL

## 2025-02-13 VITALS
OXYGEN SATURATION: 98 % | DIASTOLIC BLOOD PRESSURE: 88 MMHG | HEIGHT: 71 IN | HEART RATE: 98 BPM | BODY MASS INDEX: 33.6 KG/M2 | WEIGHT: 240 LBS | SYSTOLIC BLOOD PRESSURE: 138 MMHG

## 2025-02-13 DIAGNOSIS — I48.0 PAROXYSMAL ATRIAL FIBRILLATION: ICD-10-CM

## 2025-02-13 DIAGNOSIS — I63.512 CEREBRAL INFARCTION DUE TO UNSPECIFIED OCCLUSION OR STENOSIS OF LEFT MIDDLE CEREBRAL ARTERY: ICD-10-CM

## 2025-02-13 DIAGNOSIS — I10 ESSENTIAL (PRIMARY) HYPERTENSION: ICD-10-CM

## 2025-02-13 PROCEDURE — 93000 ELECTROCARDIOGRAM COMPLETE: CPT

## 2025-02-13 PROCEDURE — 99214 OFFICE O/P EST MOD 30 MIN: CPT

## 2025-02-13 RX ORDER — AMLODIPINE BESYLATE 5 MG/1
5 TABLET ORAL DAILY
Qty: 30 | Refills: 0 | Status: ACTIVE | COMMUNITY
Start: 1900-01-01 | End: 1900-01-01

## 2025-02-14 ENCOUNTER — NON-APPOINTMENT (OUTPATIENT)
Age: 55
End: 2025-02-14

## 2025-02-15 ENCOUNTER — APPOINTMENT (OUTPATIENT)
Dept: MRI IMAGING | Facility: CLINIC | Age: 55
End: 2025-02-15

## 2025-03-03 ENCOUNTER — APPOINTMENT (OUTPATIENT)
Dept: CARDIOTHORACIC SURGERY | Facility: CLINIC | Age: 55
End: 2025-03-03

## 2025-03-04 ENCOUNTER — NON-APPOINTMENT (OUTPATIENT)
Age: 55
End: 2025-03-04

## 2025-03-04 ENCOUNTER — TRANSCRIPTION ENCOUNTER (OUTPATIENT)
Age: 55
End: 2025-03-04

## 2025-03-11 ENCOUNTER — RX RENEWAL (OUTPATIENT)
Age: 55
End: 2025-03-11

## 2025-03-14 ENCOUNTER — APPOINTMENT (OUTPATIENT)
Dept: NEUROLOGY | Facility: CLINIC | Age: 55
End: 2025-03-14

## 2025-03-20 ENCOUNTER — LABORATORY RESULT (OUTPATIENT)
Age: 55
End: 2025-03-20

## 2025-03-20 ENCOUNTER — APPOINTMENT (OUTPATIENT)
Dept: HEART AND VASCULAR | Facility: CLINIC | Age: 55
End: 2025-03-20
Payer: OTHER GOVERNMENT

## 2025-03-20 VITALS
TEMPERATURE: 97.2 F | SYSTOLIC BLOOD PRESSURE: 118 MMHG | HEIGHT: 71 IN | BODY MASS INDEX: 32.76 KG/M2 | DIASTOLIC BLOOD PRESSURE: 79 MMHG | HEART RATE: 93 BPM | WEIGHT: 234 LBS | OXYGEN SATURATION: 96 %

## 2025-03-20 DIAGNOSIS — Q21.11 SECUNDUM ATRIAL SEPTAL DEFECT: ICD-10-CM

## 2025-03-20 DIAGNOSIS — Q21.10 ATRIAL SEPTAL DEFECT, UNSPECIFIED: ICD-10-CM

## 2025-03-20 DIAGNOSIS — I48.0 PAROXYSMAL ATRIAL FIBRILLATION: ICD-10-CM

## 2025-03-20 DIAGNOSIS — I63.512 CEREBRAL INFARCTION DUE TO UNSPECIFIED OCCLUSION OR STENOSIS OF LEFT MIDDLE CEREBRAL ARTERY: ICD-10-CM

## 2025-03-20 PROCEDURE — G2211 COMPLEX E/M VISIT ADD ON: CPT

## 2025-03-20 PROCEDURE — 36415 COLL VENOUS BLD VENIPUNCTURE: CPT

## 2025-03-20 PROCEDURE — 99215 OFFICE O/P EST HI 40 MIN: CPT

## 2025-03-20 PROCEDURE — 93000 ELECTROCARDIOGRAM COMPLETE: CPT

## 2025-03-21 ENCOUNTER — TRANSCRIPTION ENCOUNTER (OUTPATIENT)
Age: 55
End: 2025-03-21

## 2025-03-21 LAB
CHOLEST SERPL-MCNC: 108 MG/DL
HDLC SERPL-MCNC: 41 MG/DL
LDLC SERPL-MCNC: 54 MG/DL
NONHDLC SERPL-MCNC: 67 MG/DL
TRIGL SERPL-MCNC: 59 MG/DL

## 2025-03-24 ENCOUNTER — NON-APPOINTMENT (OUTPATIENT)
Age: 55
End: 2025-03-24

## 2025-03-27 ENCOUNTER — TRANSCRIPTION ENCOUNTER (OUTPATIENT)
Age: 55
End: 2025-03-27

## 2025-03-28 ENCOUNTER — TRANSCRIPTION ENCOUNTER (OUTPATIENT)
Age: 55
End: 2025-03-28

## 2025-04-01 ENCOUNTER — NON-APPOINTMENT (OUTPATIENT)
Age: 55
End: 2025-04-01

## 2025-04-01 ENCOUNTER — APPOINTMENT (OUTPATIENT)
Dept: OPHTHALMOLOGY | Facility: CLINIC | Age: 55
End: 2025-04-01
Payer: COMMERCIAL

## 2025-04-01 PROCEDURE — 92025 CPTRIZED CORNEAL TOPOGRAPHY: CPT

## 2025-04-01 PROCEDURE — 92004 COMPRE OPH EXAM NEW PT 1/>: CPT

## 2025-04-01 PROCEDURE — 92134 CPTRZ OPH DX IMG PST SGM RTA: CPT

## 2025-04-01 PROCEDURE — 92136 OPHTHALMIC BIOMETRY: CPT

## 2025-04-11 ENCOUNTER — NON-APPOINTMENT (OUTPATIENT)
Age: 55
End: 2025-04-11

## 2025-04-13 ENCOUNTER — EMERGENCY (EMERGENCY)
Facility: HOSPITAL | Age: 55
LOS: 1 days | End: 2025-04-13
Attending: STUDENT IN AN ORGANIZED HEALTH CARE EDUCATION/TRAINING PROGRAM | Admitting: STUDENT IN AN ORGANIZED HEALTH CARE EDUCATION/TRAINING PROGRAM
Payer: COMMERCIAL

## 2025-04-13 VITALS
DIASTOLIC BLOOD PRESSURE: 85 MMHG | RESPIRATION RATE: 97 BRPM | WEIGHT: 216.05 LBS | HEIGHT: 71 IN | HEART RATE: 80 BPM | TEMPERATURE: 98 F | OXYGEN SATURATION: 98 % | SYSTOLIC BLOOD PRESSURE: 129 MMHG

## 2025-04-13 VITALS
DIASTOLIC BLOOD PRESSURE: 64 MMHG | TEMPERATURE: 98 F | HEART RATE: 77 BPM | SYSTOLIC BLOOD PRESSURE: 107 MMHG | OXYGEN SATURATION: 98 % | RESPIRATION RATE: 18 BRPM

## 2025-04-13 DIAGNOSIS — Z98.890 OTHER SPECIFIED POSTPROCEDURAL STATES: Chronic | ICD-10-CM

## 2025-04-13 LAB
ALBUMIN SERPL ELPH-MCNC: 3.8 G/DL — SIGNIFICANT CHANGE UP (ref 3.3–5)
ALP SERPL-CCNC: 122 U/L — HIGH (ref 30–120)
ALT FLD-CCNC: 37 U/L — SIGNIFICANT CHANGE UP (ref 10–60)
ANION GAP SERPL CALC-SCNC: 7 MMOL/L — SIGNIFICANT CHANGE UP (ref 5–17)
APTT BLD: 35.7 SEC — HIGH (ref 24.5–35.6)
AST SERPL-CCNC: 29 U/L — SIGNIFICANT CHANGE UP (ref 10–40)
BASOPHILS # BLD AUTO: 0.04 K/UL — SIGNIFICANT CHANGE UP (ref 0–0.2)
BASOPHILS NFR BLD AUTO: 0.8 % — SIGNIFICANT CHANGE UP (ref 0–2)
BILIRUB SERPL-MCNC: 0.3 MG/DL — SIGNIFICANT CHANGE UP (ref 0.2–1.2)
BUN SERPL-MCNC: 23 MG/DL — SIGNIFICANT CHANGE UP (ref 7–23)
CALCIUM SERPL-MCNC: 9.1 MG/DL — SIGNIFICANT CHANGE UP (ref 8.4–10.5)
CHLORIDE SERPL-SCNC: 103 MMOL/L — SIGNIFICANT CHANGE UP (ref 96–108)
CO2 SERPL-SCNC: 30 MMOL/L — SIGNIFICANT CHANGE UP (ref 22–31)
CREAT SERPL-MCNC: 0.98 MG/DL — SIGNIFICANT CHANGE UP (ref 0.5–1.3)
EGFR: 92 ML/MIN/1.73M2 — SIGNIFICANT CHANGE UP
EGFR: 92 ML/MIN/1.73M2 — SIGNIFICANT CHANGE UP
EOSINOPHIL # BLD AUTO: 0.14 K/UL — SIGNIFICANT CHANGE UP (ref 0–0.5)
EOSINOPHIL NFR BLD AUTO: 2.8 % — SIGNIFICANT CHANGE UP (ref 0–6)
GLUCOSE SERPL-MCNC: 101 MG/DL — HIGH (ref 70–99)
HCT VFR BLD CALC: 42.8 % — SIGNIFICANT CHANGE UP (ref 39–50)
HGB BLD-MCNC: 13.9 G/DL — SIGNIFICANT CHANGE UP (ref 13–17)
IMM GRANULOCYTES NFR BLD AUTO: 0.2 % — SIGNIFICANT CHANGE UP (ref 0–0.9)
INR BLD: 1.2 RATIO — HIGH (ref 0.85–1.16)
LYMPHOCYTES # BLD AUTO: 2.13 K/UL — SIGNIFICANT CHANGE UP (ref 1–3.3)
LYMPHOCYTES # BLD AUTO: 42.3 % — SIGNIFICANT CHANGE UP (ref 13–44)
MAGNESIUM SERPL-MCNC: 2 MG/DL — SIGNIFICANT CHANGE UP (ref 1.6–2.6)
MCHC RBC-ENTMCNC: 27.6 PG — SIGNIFICANT CHANGE UP (ref 27–34)
MCHC RBC-ENTMCNC: 32.5 G/DL — SIGNIFICANT CHANGE UP (ref 32–36)
MCV RBC AUTO: 84.9 FL — SIGNIFICANT CHANGE UP (ref 80–100)
MONOCYTES # BLD AUTO: 0.65 K/UL — SIGNIFICANT CHANGE UP (ref 0–0.9)
MONOCYTES NFR BLD AUTO: 12.9 % — SIGNIFICANT CHANGE UP (ref 2–14)
NEUTROPHILS # BLD AUTO: 2.07 K/UL — SIGNIFICANT CHANGE UP (ref 1.8–7.4)
NEUTROPHILS NFR BLD AUTO: 41 % — LOW (ref 43–77)
NRBC BLD AUTO-RTO: 0 /100 WBCS — SIGNIFICANT CHANGE UP (ref 0–0)
NT-PROBNP SERPL-SCNC: 146 PG/ML — HIGH (ref 0–125)
PLATELET # BLD AUTO: 329 K/UL — SIGNIFICANT CHANGE UP (ref 150–400)
POTASSIUM SERPL-MCNC: 3.9 MMOL/L — SIGNIFICANT CHANGE UP (ref 3.5–5.3)
POTASSIUM SERPL-SCNC: 3.9 MMOL/L — SIGNIFICANT CHANGE UP (ref 3.5–5.3)
PROT SERPL-MCNC: 7.5 G/DL — SIGNIFICANT CHANGE UP (ref 6–8.3)
PROTHROM AB SERPL-ACNC: 13.9 SEC — HIGH (ref 9.9–13.4)
RBC # BLD: 5.04 M/UL — SIGNIFICANT CHANGE UP (ref 4.2–5.8)
RBC # FLD: 13 % — SIGNIFICANT CHANGE UP (ref 10.3–14.5)
SODIUM SERPL-SCNC: 140 MMOL/L — SIGNIFICANT CHANGE UP (ref 135–145)
TROPONIN I, HIGH SENSITIVITY RESULT: 10.2 NG/L — SIGNIFICANT CHANGE UP
WBC # BLD: 5.04 K/UL — SIGNIFICANT CHANGE UP (ref 3.8–10.5)
WBC # FLD AUTO: 5.04 K/UL — SIGNIFICANT CHANGE UP (ref 3.8–10.5)

## 2025-04-13 PROCEDURE — 99285 EMERGENCY DEPT VISIT HI MDM: CPT

## 2025-04-13 PROCEDURE — 99285 EMERGENCY DEPT VISIT HI MDM: CPT | Mod: 25

## 2025-04-13 PROCEDURE — 83880 ASSAY OF NATRIURETIC PEPTIDE: CPT

## 2025-04-13 PROCEDURE — 85730 THROMBOPLASTIN TIME PARTIAL: CPT

## 2025-04-13 PROCEDURE — 80053 COMPREHEN METABOLIC PANEL: CPT

## 2025-04-13 PROCEDURE — 83735 ASSAY OF MAGNESIUM: CPT

## 2025-04-13 PROCEDURE — 71045 X-RAY EXAM CHEST 1 VIEW: CPT

## 2025-04-13 PROCEDURE — 85610 PROTHROMBIN TIME: CPT

## 2025-04-13 PROCEDURE — 36415 COLL VENOUS BLD VENIPUNCTURE: CPT

## 2025-04-13 PROCEDURE — 84484 ASSAY OF TROPONIN QUANT: CPT

## 2025-04-13 PROCEDURE — 93010 ELECTROCARDIOGRAM REPORT: CPT

## 2025-04-13 PROCEDURE — 71045 X-RAY EXAM CHEST 1 VIEW: CPT | Mod: 26

## 2025-04-13 PROCEDURE — 93005 ELECTROCARDIOGRAM TRACING: CPT

## 2025-04-13 PROCEDURE — 85025 COMPLETE CBC W/AUTO DIFF WBC: CPT

## 2025-04-13 PROCEDURE — 96360 HYDRATION IV INFUSION INIT: CPT

## 2025-04-13 RX ADMIN — Medication 1000 MILLILITER(S): at 05:55

## 2025-04-13 RX ADMIN — Medication 1000 MILLILITER(S): at 04:54

## 2025-04-13 NOTE — ED PROVIDER NOTE - PATIENT PORTAL LINK FT
You can access the FollowMyHealth Patient Portal offered by [facility] by registering at the following website: [email or web address]. By joining [employer]’s FollowMyHealth portal, you will also be able to view your health information using other applications (apps) compatible with our system.

## 2025-04-13 NOTE — ED ADULT NURSE NOTE - NSICDXFAMILYHX_GEN_ALL_CORE_FT
FAMILY HISTORY:  Father  Still living? Unknown  FH: heart disease, Age at diagnosis: Age Unknown    Mother  Still living? Unknown  FH: stroke, Age at diagnosis: Age Unknown

## 2025-04-13 NOTE — ED ADULT NURSE NOTE - OBJECTIVE STATEMENT
54M PMH Afib s/p ablation (on Eliquis and compliant), HADLEY, RA, HTN p/w "feeling heat in my body" and SOB that started about 1 hr ago while he was sleeping. He states his HR at home when he checked it was in the 40's. Currently HR is normal. Denies recent immobilization, chest pain, leg swelling, changes to meds. Last ablation was in October with Dr. Castanon. EP Dr. Waldrop

## 2025-04-13 NOTE — ED ADULT NURSE NOTE - CHPI ED NUR AGGRAVATING FX
[facility] HOSPITALIST PROGRESS NOTE    Subjective  Patient is resting comfortably in bed. She states that she has been eating well. Denies any chest pain, abdominal pain or shortness of breath.  States that she has been up out of bed ambulating in her room, but she states that she has not yet ambulated in the hallways.    d/w RN.    Objective    Vitals  Vital Signs (last 24 hrs)_____ Last Charted___________Minimum____________ Maximum____________  Temp    98  (OCT 28 08:07) L 97.7 (OCT 27 17:39) 98  (OCT 27 20:26)  Heart Rate   97  (OCT 28 08:07) 97  (OCT 28 08:07) H 104 (OCT 27 17:39)  Resp Rate       16  (OCT 28 08:07) 16  (OCT 27 17:39) 16  (OCT 27 17:39)  SBP    H 146 (OCT 28 08:07) 127  (OCT 27 20:26) H 146 (OCT 28 08:07)  DBP    74  (OCT 28 08:07) 69  (OCT 27 20:26) 80  (OCT 27 17:39)    Physical Examination  GENERAL: Alert, no acute distress  SKIN: Warm, dry  ENT: oral mucosa moist  CARDIOVASCULAR: irregular rhythm, regular rate, murmur  RESPIRATORY: Lungs CTA, non-labored respirations  GASTROINTESTINAL: Soft, non distended, normal BS, mild RLQ TTP, no rebounding/guarding  MUSCULOSKELETAL: Trace BLE edema  NEUROLOGIC: A&O x 4, no focal neuro deficits  HEAD: Normocephalic atraumatic    Laboratory Data/Radiology/Microbiology  Labs (Last four charted values)  WBC                  5.6 (OCT 28) 5.2 (OCT 27) 4.3 (OCT 26) 6.0 (OCT 25)   Hgb                  L 8.0 (OCT 28) L 7.5 (OCT 27) L 7.4 (OCT 27) L 7.2 (OCT 26)   Hct                  L 26 (OCT 28) L 23 (OCT 27) L 23 (OCT 27) L 23 (OCT 26)   Plt                  205 (OCT 28) 176 (OCT 27) 164 (OCT 26) 195 (OCT 25)   Na                   L 132 (OCT 28) L 135 (OCT 27) L 135 (OCT 26) 138 (OCT 25)   K                    3.5 (OCT 28) L 3.3 (OCT 27) L 3.3 (OCT 26) L 2.9 (OCT 25)   CO2                  26 (OCT 28) 26 (OCT 27) 23 (OCT 26) H 30 (OCT 25)   Cl                   98 (OCT 28) 100 (OCT 27) 101 (OCT 26) 99 (OCT 25)   Cr                   H 3.63 (OCT 28) H  4.75 (OCT 27) H 3.43 (OCT 26) H 2.37 (OCT 25)   BUN                  H 25 (OCT 28) H 41 (OCT 27) H 30 (OCT 26) 17 (OCT 25)   Glucose              H 151 (OCT 28) H 157 (OCT 27) H 113 (OCT 26) H 122 (OCT 25)   Mg                   1.9 (OCT 28) 2.0 (OCT 27) 2.0 (OCT 26)   Phos                 L 2.0 (OCT 28) 3.5 (OCT 27)   Ca                   L 8.2 (OCT 28) L 8.3 (OCT 27) 9.0 (OCT 26) 9.8 (OCT 25)   PT                   H 13.2 (OCT 25)   INR                  H 1.3 (OCT 25)   PTT                  29 (OCT 25)     CT A/P   1.  Cirrhotic liver with portal hypertension as large ascites, and  diffuse varices.  There is no discrete hepatic mass.  2.  Small right pleural effusion and right lung base subpleural  atelectasis.  Please correlate with chest study.  3.  Enlarged cardiac size.  4.  Mild circumferential wall thickening/edema of the right colon is  most likely reactive.  There is mild to moderate diverticulosis of  sigmoid colon.  5.  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  6.  Numerous nonenlarged lymph nodes in the mesentery and  retroperitoneal spaces are most likely reactive.    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, and mild portal hypertensive gastropahty; hemostasis achieved with APC ablation   Continue with IV Protonix, PO Carafate, Zofran prn and pain control as ordered prn   Diet: soft GI/fiber restricted   Monitoring clinically    GI following     Acute on chronic normocytic anemia d/t acute blood loss 2/2 above and underlying end stage renal disease   Hgb 7.4 --> 8.0 today s/p 1 unit of pRBCs on 10/25   Started on epoetin  darlene Tues/Thurs/Saturday   Monitoring H&H, transfuse for Hgb < 7.0      Chronic liver cirrhosis of unclear etiology with portal hypertension and diffuse varices   CT A/P as above   Plan for liver biopsy on Monday    GI following    End stage renal disease on HD   Creatinine 4.75 --> 3.63 today    Continue with HD on Tues/Thurs/Sat, Nephrology managing   Monitoring renal function, avoid nephrotoxins as able   Nephrology following     Atrial fibrillation   HR remains controlled on Coreg   Not on chronic AC due to recurrent bleeding   Monitoring on telemetry      Primary hypertension  Hypotension - resolved   BP intermittently slightly elevated   Continue with Coreg and Imdur    Monitor VS closely     Diabetes Mellitus Type 2   BS remains well controlled; HbA1c 5.5 on previous admission   Hold home Lantus   Monitoring with AccuCheks, cover with SSI     Hypophosphatemia   Phos 3.5 --> 2.0 today    Repleting with potassium phosphate-sodium phosphate   Monitor Phos closely      Hyponatremia    Na 135 --> 132 today    Monitoring BMP     Hypokalemia - resolved s/p repletion    Coronary artery disease with hx/o MI s/p CABG - Continue with Coreg and Imdur; hold ASA  Chronic heart failure (type unknown) - no recent echocardiogram on file, continue Coreg; monitor I&Os and daily weights  Hypothyroidism - continue levothyroxine  Hyperlipidemia - Patient not on home statin therapy  Hx/o breast cancer    DVT PPx: Pt refusing SCDs (No chemical AC d/t above GI bleed). D/w RN to ambulate pt    d/w Dr. Saez  PCP: NOS    Disposition: Pending further workup, clinical improvement in the above, and input from GI. Timing uncertain. Planning for liver biopsy on Monday for further evaluation of liver cirrhosis.    All labs and imaging studies have been reviewed.  All patient questions have been answered.    Charting performed by luann Hernandez for Dr. Yolande Gómez    All medical record entries made by the luann were at my  direction. I have reviewed the chart and agree that the record accurately reflects my personal performance of the history, physical exam, hospital course, and assessment and plan.             Electronically Signed On 10.28.2018 12:12  ___________________________________________________   Rico CHAVEZ, Yolande Armstrong     none

## 2025-04-13 NOTE — ED PROVIDER NOTE - CLINICAL SUMMARY MEDICAL DECISION MAKING FREE TEXT BOX
54M PMH Afib s/p ablation (on Eliquis and compliant), HADLEY, RA, HTN p/w "feeling heat in my body" and SOB that started about 1 hr ago while he was sleeping. He states his HR at home when he checked it was in the 40's. Currently HR is normal. Denies recent immobilization, chest pain, leg swelling, changes to meds. Last ablation was in October with Dr. Castanon. EP Dr. Waldrop    Gen: NAD, non-toxic appearing, awake alert   HEENT: normal conjunctiva, oral mucosa moist  Lung: CTAB, no respiratory distress, no wheezes/rhonchi/rales B/L, speaking in full sentences  CV: RRR  Abd: soft, NT, ND, no guarding, no rigidity, no rebound tenderness  MSK: no visible deformities, ROM normal in UE/LE  Neuro: No focal sensory or motor deficits  Skin: Warm, well perfused, no leg swelling    DDx includes but not limited to: low concern for PE as pt has normal HR and normal O2 sat. Will eval for ACS, HENRIETTA, lyte derangements, arrhythmia  Plan: cardiac labs, cxr, ivf, reassess symptoms    See Progress Notes for further updates on ED Course 54M PMH Afib s/p ablation (on Eliquis and compliant), HADLEY, RA, HTN p/w "feeling heat in my body" and SOB that started about 1 hr ago while he was sleeping. He states his HR at home when he checked it was in the 40's. Currently HR is normal. Denies recent immobilization, chest pain, leg swelling, changes to meds. Last ablation was in October with Dr. Castanon. EP Dr. Waldrop    Gen: NAD, non-toxic appearing, awake alert   HEENT: normal conjunctiva, oral mucosa moist  Lung: CTAB, no respiratory distress, no wheezes/rhonchi/rales B/L, speaking in full sentences  CV: RRR  Abd: soft, NT, ND, no guarding, no rigidity, no rebound tenderness  MSK: no visible deformities, ROM normal in UE/LE  Neuro: No focal sensory or motor deficits, no facial droop, ambulating w/o difficulty  Skin: Warm, well perfused, no leg swelling    DDx includes but not limited to: low concern for PE as pt has normal HR and normal O2 sat. Will eval for ACS, HENRIETTA, lyte derangements, arrhythmia  Plan: cardiac labs, cxr, ivf, reassess symptoms    See Progress Notes for further updates on ED Course

## 2025-04-13 NOTE — ED PROVIDER NOTE - NSFOLLOWUPINSTRUCTIONS_ED_ALL_ED_FT
You were seen today in the emergency room for shortness of breath    You need to follow up with your primary care doctor and cardiologist in the next 48-72 hours    If you develop any new or worsening symptoms you need to return immediately to the emergency department. If you experience any of the following please come right back to the emergency room: chest pain that becomes much worse with walking up stairs or exercising, uncontrollable nausea and vomiting, severe chest pain that will not go away, passing out, new persistent numbness and/or weakness You were seen today in the emergency room for shortness of breath    You need to follow up with your cardiologist and electrophysiologist in the next 48-72 hours    If you develop any new or worsening symptoms you need to return immediately to the emergency department. If you experience any of the following please come right back to the emergency room: chest pain that becomes much worse with walking up stairs or exercising, uncontrollable nausea and vomiting, severe chest pain that will not go away, passing out, new persistent numbness and/or weakness

## 2025-04-13 NOTE — ED PROVIDER NOTE - PROGRESS NOTE DETAILS
labs/imaging nonactionable. explained results of w/u to pt and all quests answered. I informed pt of the pvc's seen on ECG and the cardiac monitor. told him he needs to f/u with his EP and cardiologist very urgently. strict return precautions given and lengthy discussion was had with pt. will dc to home; all quests answered

## 2025-04-28 ENCOUNTER — NON-APPOINTMENT (OUTPATIENT)
Age: 55
End: 2025-04-28

## 2025-04-28 ENCOUNTER — APPOINTMENT (OUTPATIENT)
Dept: ELECTROPHYSIOLOGY | Facility: CLINIC | Age: 55
End: 2025-04-28
Payer: COMMERCIAL

## 2025-04-28 VITALS
TEMPERATURE: 97.5 F | DIASTOLIC BLOOD PRESSURE: 73 MMHG | BODY MASS INDEX: 32.94 KG/M2 | SYSTOLIC BLOOD PRESSURE: 120 MMHG | OXYGEN SATURATION: 95 % | WEIGHT: 235.25 LBS | HEART RATE: 90 BPM | HEIGHT: 71 IN | RESPIRATION RATE: 17 BRPM

## 2025-04-28 DIAGNOSIS — Z86.79 OTHER SPECIFIED POSTPROCEDURAL STATES: ICD-10-CM

## 2025-04-28 DIAGNOSIS — Z98.890 OTHER SPECIFIED POSTPROCEDURAL STATES: ICD-10-CM

## 2025-04-28 DIAGNOSIS — I63.512 CEREBRAL INFARCTION DUE TO UNSPECIFIED OCCLUSION OR STENOSIS OF LEFT MIDDLE CEREBRAL ARTERY: ICD-10-CM

## 2025-04-28 DIAGNOSIS — I10 ESSENTIAL (PRIMARY) HYPERTENSION: ICD-10-CM

## 2025-04-28 DIAGNOSIS — K21.9 GASTRO-ESOPHAGEAL REFLUX DISEASE W/OUT ESOPHAGITIS: ICD-10-CM

## 2025-04-28 DIAGNOSIS — I48.0 PAROXYSMAL ATRIAL FIBRILLATION: ICD-10-CM

## 2025-04-28 PROCEDURE — 93000 ELECTROCARDIOGRAM COMPLETE: CPT

## 2025-04-28 PROCEDURE — G2211 COMPLEX E/M VISIT ADD ON: CPT

## 2025-04-28 PROCEDURE — 99214 OFFICE O/P EST MOD 30 MIN: CPT

## 2025-05-08 ENCOUNTER — NON-APPOINTMENT (OUTPATIENT)
Age: 55
End: 2025-05-08

## 2025-05-08 ENCOUNTER — APPOINTMENT (OUTPATIENT)
Dept: HEART AND VASCULAR | Facility: CLINIC | Age: 55
End: 2025-05-08
Payer: COMMERCIAL

## 2025-05-08 VITALS
BODY MASS INDEX: 32.76 KG/M2 | WEIGHT: 234 LBS | SYSTOLIC BLOOD PRESSURE: 134 MMHG | OXYGEN SATURATION: 97 % | DIASTOLIC BLOOD PRESSURE: 80 MMHG | TEMPERATURE: 97.4 F | HEART RATE: 81 BPM | HEIGHT: 71 IN

## 2025-05-08 DIAGNOSIS — R00.2 PALPITATIONS: ICD-10-CM

## 2025-05-08 PROCEDURE — 93246 EXT ECG>7D<15D RECORDING: CPT

## 2025-05-08 PROCEDURE — 99214 OFFICE O/P EST MOD 30 MIN: CPT

## 2025-05-08 PROCEDURE — 93000 ELECTROCARDIOGRAM COMPLETE: CPT | Mod: 59

## 2025-05-17 ENCOUNTER — APPOINTMENT (OUTPATIENT)
Dept: INTERNAL MEDICINE | Facility: CLINIC | Age: 55
End: 2025-05-17
Payer: COMMERCIAL

## 2025-05-17 VITALS
HEIGHT: 71 IN | BODY MASS INDEX: 33.04 KG/M2 | HEART RATE: 74 BPM | TEMPERATURE: 97.5 F | WEIGHT: 236 LBS | OXYGEN SATURATION: 98 % | RESPIRATION RATE: 17 BRPM

## 2025-05-17 VITALS — DIASTOLIC BLOOD PRESSURE: 74 MMHG | SYSTOLIC BLOOD PRESSURE: 110 MMHG

## 2025-05-17 DIAGNOSIS — H15.009 UNSPECIFIED SCLERITIS, UNSPECIFIED EYE: ICD-10-CM

## 2025-05-17 DIAGNOSIS — Z01.818 ENCOUNTER FOR OTHER PREPROCEDURAL EXAMINATION: ICD-10-CM

## 2025-05-17 DIAGNOSIS — I48.0 PAROXYSMAL ATRIAL FIBRILLATION: ICD-10-CM

## 2025-05-17 DIAGNOSIS — I63.512 CEREBRAL INFARCTION DUE TO UNSPECIFIED OCCLUSION OR STENOSIS OF LEFT MIDDLE CEREBRAL ARTERY: ICD-10-CM

## 2025-05-17 DIAGNOSIS — I48.4 ATYPICAL ATRIAL FLUTTER: ICD-10-CM

## 2025-05-17 DIAGNOSIS — I10 ESSENTIAL (PRIMARY) HYPERTENSION: ICD-10-CM

## 2025-05-17 DIAGNOSIS — Z86.79 OTHER SPECIFIED POSTPROCEDURAL STATES: ICD-10-CM

## 2025-05-17 DIAGNOSIS — Q21.12 PATENT FORAMEN OVALE: ICD-10-CM

## 2025-05-17 DIAGNOSIS — R51.9 HEADACHE, UNSPECIFIED: ICD-10-CM

## 2025-05-17 DIAGNOSIS — Z98.890 OTHER SPECIFIED POSTPROCEDURAL STATES: ICD-10-CM

## 2025-05-17 DIAGNOSIS — Q21.11 SECUNDUM ATRIAL SEPTAL DEFECT: ICD-10-CM

## 2025-05-17 DIAGNOSIS — H26.9 UNSPECIFIED CATARACT: ICD-10-CM

## 2025-05-17 PROCEDURE — 36415 COLL VENOUS BLD VENIPUNCTURE: CPT

## 2025-05-17 PROCEDURE — 99214 OFFICE O/P EST MOD 30 MIN: CPT

## 2025-05-17 PROCEDURE — G2211 COMPLEX E/M VISIT ADD ON: CPT

## 2025-05-19 LAB
APTT BLD: 35.6 SEC
HCT VFR BLD CALC: 43.1 %
HGB BLD-MCNC: 13.9 G/DL
INR PPP: 1.09 RATIO
MCHC RBC-ENTMCNC: 27.9 PG
MCHC RBC-ENTMCNC: 32.3 G/DL
MCV RBC AUTO: 86.4 FL
PLATELET # BLD AUTO: 344 K/UL
PT BLD: 13 SEC
RBC # BLD: 4.99 M/UL
RBC # FLD: 13.2 %
WBC # FLD AUTO: 4.22 K/UL

## 2025-05-29 ENCOUNTER — APPOINTMENT (OUTPATIENT)
Dept: OPHTHALMOLOGY | Facility: CLINIC | Age: 55
End: 2025-05-29
Payer: COMMERCIAL

## 2025-05-29 ENCOUNTER — NON-APPOINTMENT (OUTPATIENT)
Age: 55
End: 2025-05-29

## 2025-05-29 PROCEDURE — 92014 COMPRE OPH EXAM EST PT 1/>: CPT

## 2025-05-31 ENCOUNTER — EMERGENCY (EMERGENCY)
Facility: HOSPITAL | Age: 55
LOS: 1 days | End: 2025-05-31
Attending: EMERGENCY MEDICINE | Admitting: EMERGENCY MEDICINE
Payer: COMMERCIAL

## 2025-05-31 VITALS
WEIGHT: 235.01 LBS | OXYGEN SATURATION: 98 % | HEART RATE: 74 BPM | TEMPERATURE: 98 F | HEIGHT: 71 IN | RESPIRATION RATE: 20 BRPM | DIASTOLIC BLOOD PRESSURE: 87 MMHG | SYSTOLIC BLOOD PRESSURE: 133 MMHG

## 2025-05-31 VITALS
DIASTOLIC BLOOD PRESSURE: 85 MMHG | SYSTOLIC BLOOD PRESSURE: 131 MMHG | HEART RATE: 77 BPM | TEMPERATURE: 98 F | OXYGEN SATURATION: 98 % | RESPIRATION RATE: 18 BRPM

## 2025-05-31 DIAGNOSIS — Z98.890 OTHER SPECIFIED POSTPROCEDURAL STATES: Chronic | ICD-10-CM

## 2025-05-31 LAB
ALBUMIN SERPL ELPH-MCNC: 3.9 G/DL — SIGNIFICANT CHANGE UP (ref 3.3–5)
ALP SERPL-CCNC: 99 U/L — SIGNIFICANT CHANGE UP (ref 30–120)
ALT FLD-CCNC: 43 U/L — SIGNIFICANT CHANGE UP (ref 10–60)
ANION GAP SERPL CALC-SCNC: 7 MMOL/L — SIGNIFICANT CHANGE UP (ref 5–17)
AST SERPL-CCNC: 36 U/L — SIGNIFICANT CHANGE UP (ref 10–40)
BASOPHILS # BLD AUTO: 0.03 K/UL — SIGNIFICANT CHANGE UP (ref 0–0.2)
BASOPHILS NFR BLD AUTO: 0.8 % — SIGNIFICANT CHANGE UP (ref 0–2)
BILIRUB SERPL-MCNC: 0.9 MG/DL — SIGNIFICANT CHANGE UP (ref 0.2–1.2)
BUN SERPL-MCNC: 18 MG/DL — SIGNIFICANT CHANGE UP (ref 7–23)
CALCIUM SERPL-MCNC: 9.3 MG/DL — SIGNIFICANT CHANGE UP (ref 8.4–10.5)
CHLORIDE SERPL-SCNC: 102 MMOL/L — SIGNIFICANT CHANGE UP (ref 96–108)
CO2 SERPL-SCNC: 30 MMOL/L — SIGNIFICANT CHANGE UP (ref 22–31)
CREAT SERPL-MCNC: 1.02 MG/DL — SIGNIFICANT CHANGE UP (ref 0.5–1.3)
EGFR: 87 ML/MIN/1.73M2 — SIGNIFICANT CHANGE UP
EGFR: 87 ML/MIN/1.73M2 — SIGNIFICANT CHANGE UP
EOSINOPHIL # BLD AUTO: 0.18 K/UL — SIGNIFICANT CHANGE UP (ref 0–0.5)
EOSINOPHIL NFR BLD AUTO: 4.9 % — SIGNIFICANT CHANGE UP (ref 0–6)
GLUCOSE SERPL-MCNC: 106 MG/DL — HIGH (ref 70–99)
HCT VFR BLD CALC: 41.5 % — SIGNIFICANT CHANGE UP (ref 39–50)
HCV AB S/CO SERPL IA: 0.17 S/CO — SIGNIFICANT CHANGE UP (ref 0–0.79)
HCV AB SERPL-IMP: SIGNIFICANT CHANGE UP
HGB BLD-MCNC: 13.4 G/DL — SIGNIFICANT CHANGE UP (ref 13–17)
HIV 1 & 2 AB SERPL IA.RAPID: SIGNIFICANT CHANGE UP
IMM GRANULOCYTES NFR BLD AUTO: 0.3 % — SIGNIFICANT CHANGE UP (ref 0–0.9)
LYMPHOCYTES # BLD AUTO: 1.44 K/UL — SIGNIFICANT CHANGE UP (ref 1–3.3)
LYMPHOCYTES # BLD AUTO: 39.2 % — SIGNIFICANT CHANGE UP (ref 13–44)
MCHC RBC-ENTMCNC: 27.5 PG — SIGNIFICANT CHANGE UP (ref 27–34)
MCHC RBC-ENTMCNC: 32.3 G/DL — SIGNIFICANT CHANGE UP (ref 32–36)
MCV RBC AUTO: 85.2 FL — SIGNIFICANT CHANGE UP (ref 80–100)
MONOCYTES # BLD AUTO: 0.49 K/UL — SIGNIFICANT CHANGE UP (ref 0–0.9)
MONOCYTES NFR BLD AUTO: 13.4 % — SIGNIFICANT CHANGE UP (ref 2–14)
NEUTROPHILS # BLD AUTO: 1.52 K/UL — LOW (ref 1.8–7.4)
NEUTROPHILS NFR BLD AUTO: 41.4 % — LOW (ref 43–77)
NRBC BLD AUTO-RTO: 0 /100 WBCS — SIGNIFICANT CHANGE UP (ref 0–0)
PLATELET # BLD AUTO: 288 K/UL — SIGNIFICANT CHANGE UP (ref 150–400)
POTASSIUM SERPL-MCNC: 4.6 MMOL/L — SIGNIFICANT CHANGE UP (ref 3.5–5.3)
POTASSIUM SERPL-SCNC: 4.6 MMOL/L — SIGNIFICANT CHANGE UP (ref 3.5–5.3)
PROT SERPL-MCNC: 7.5 G/DL — SIGNIFICANT CHANGE UP (ref 6–8.3)
RBC # BLD: 4.87 M/UL — SIGNIFICANT CHANGE UP (ref 4.2–5.8)
RBC # FLD: 12.8 % — SIGNIFICANT CHANGE UP (ref 10.3–14.5)
SODIUM SERPL-SCNC: 139 MMOL/L — SIGNIFICANT CHANGE UP (ref 135–145)
WBC # BLD: 3.67 K/UL — LOW (ref 3.8–10.5)
WBC # FLD AUTO: 3.67 K/UL — LOW (ref 3.8–10.5)

## 2025-05-31 PROCEDURE — 80053 COMPREHEN METABOLIC PANEL: CPT

## 2025-05-31 PROCEDURE — 36415 COLL VENOUS BLD VENIPUNCTURE: CPT

## 2025-05-31 PROCEDURE — 85025 COMPLETE CBC W/AUTO DIFF WBC: CPT

## 2025-05-31 PROCEDURE — 86803 HEPATITIS C AB TEST: CPT

## 2025-05-31 PROCEDURE — 99284 EMERGENCY DEPT VISIT MOD MDM: CPT

## 2025-05-31 PROCEDURE — 99283 EMERGENCY DEPT VISIT LOW MDM: CPT

## 2025-05-31 PROCEDURE — 86703 HIV-1/HIV-2 1 RESULT ANTBDY: CPT

## 2025-05-31 RX ORDER — DIPHENHYDRAMINE HCL 12.5MG/5ML
50 ELIXIR ORAL ONCE
Refills: 0 | Status: COMPLETED | OUTPATIENT
Start: 2025-05-31 | End: 2025-05-31

## 2025-05-31 RX ORDER — PREDNISONE 20 MG/1
40 TABLET ORAL ONCE
Refills: 0 | Status: COMPLETED | OUTPATIENT
Start: 2025-05-31 | End: 2025-05-31

## 2025-05-31 RX ORDER — PREDNISONE 20 MG/1
2 TABLET ORAL
Qty: 10 | Refills: 0
Start: 2025-05-31 | End: 2025-06-04

## 2025-05-31 RX ADMIN — Medication 20 MILLIGRAM(S): at 10:14

## 2025-05-31 RX ADMIN — Medication 50 MILLIGRAM(S): at 10:15

## 2025-05-31 RX ADMIN — PREDNISONE 40 MILLIGRAM(S): 20 TABLET ORAL at 10:15

## 2025-05-31 NOTE — ED ADULT NURSE NOTE - OBJECTIVE STATEMENT
Pt comes in ambulatory complains of rash and mild itchiness all over his torso ( anterior and posterior ) and upper extremities started this past Thursday . Pt denies any SOB,  vomiting, nausea, abdominal pain , diarrhea , fever or chills.

## 2025-05-31 NOTE — ED PROVIDER NOTE - DIFFERENTIAL DIAGNOSIS
Differential Diagnosis Differential including but not limited to contact dermatitis allergic reaction no evidence of anaphylaxis

## 2025-05-31 NOTE — ED PROVIDER NOTE - OBJECTIVE STATEMENT
Await xray and lab Patient complaining of generalized itchy rash for the past few days.  Patient denies new medications foods soap shampoo or any other known allergen.  Patient reports no difficulty breathing or swallowing no swelling or itching of the lips mouth tongue or throat no chest pain shortness of breath coughing wheezing abdominal pain nausea vomiting or any other complaints.  PMD Lyla

## 2025-05-31 NOTE — ED PROVIDER NOTE - NSFOLLOWUPINSTRUCTIONS_ED_ALL_ED_FT
ACUTE RASH - AfterCare(R) Instructions(ER/ED)    Acute Rash    WHAT YOU NEED TO KNOW:    A rash is irritated, red, or itchy skin or mucus membranes, such as the lining of your nose or throat. Acute means the rash starts suddenly, worsens quickly, and lasts a short time. Common causes include a disease or infection, a reaction to something you are allergic to, or certain medicines.    DISCHARGE INSTRUCTIONS:    Return to the emergency department if:    You have sudden trouble breathing or chest pain.    You are vomiting, have a headache or muscle aches, and your throat hurts.  Call your doctor or dermatologist if:    You have a fever.    You get open wounds from scratching your skin, or you have a wound that is red, swollen, or painful.    Your rash lasts longer than 3 months.    You have swelling or pain in your joints.    You have questions or concerns about your condition or care.  Medicines: If your rash does not go away on its own, you may need the following medicines:    Antihistamines may be given to help decrease itching.    Steroids may be given to decrease inflammation.    Antibiotics help fight or prevent a bacterial infection.    Take your medicine as directed. Contact your healthcare provider if you think your medicine is not helping or if you have side effects. Tell your provider if you are allergic to any medicine. Keep a list of the medicines, vitamins, and herbs you take. Include the amounts, and when and why you take them. Bring the list or the pill bottles to follow-up visits. Carry your medicine list with you in case of an emergency.  Prevent a rash or care for your skin when you have a rash: Dry skin can lead to more problems. Do not scratch your skin if it itches. You may cause a skin infection by scratching. The following may prevent dry skin, and help your skin look better:    Help soothe your rash. Apply thick cream lotions or petroleum jelly. Cool compresses may also soothe your skin. Apply a cool compress or a cool, wet towel, and then cover it with a dry towel.    Use lukewarm water when you bathe. Hot water may damage your skin more. Pat your skin dry. Do not rub your skin with a towel.    Use detergents, soaps, shampoos, and bubble baths made for sensitive skin.    Wear clothes made of cotton instead of nylon or wool. Cotton is softer, so it will not hurt your skin as much.  Follow up with your healthcare providers as directed: A dermatologist may help find the cause of your rash or help plan or change treatment. A dietitian may help with meal planning if you have a food allergy. Write down your questions so you remember to ask them during your visits.            Pruritus  Pruritus is an itchy feeling on the skin. One of the most common causes is dry skin, but many different things can cause itching. Most cases of itching do not require medical attention. Sometimes itchy skin can turn into a rash or a secondary infection.    Follow these instructions at home:  Skin care    A person applying skin lotion moisturizer to the hands.  Do not use scented soaps, detergents, perfumes, and cosmetic products. Instead, use gentle, unscented versions of these items.  Apply moisturizing creams to your skin frequently, at least twice daily. Apply immediately after bathing while skin is still wet.  Take medicines or apply medicated creams only as told by your health care provider. This may include:  Corticosteroid cream or topical calcineurin inhibitor.  Anti-itch lotions containing urea, camphor, or menthol.  Oral antihistamines.  Do not take hot showers or baths, which can make itching worse. A short, cool shower may help with itching as long as you apply moisturizing lotion after the shower.  Apply a cool, wet cloth (cool compress) to the affected areas.  You may take lukewarm baths with one of the following:  Epsom salts. You can get these at your local pharmacy or grocery store. Follow the instructions on the packaging.  Baking soda. Pour a small amount into the bath as told by your health care provider.  Colloidal oatmeal. You can get this at your local pharmacy or grocery store. Follow the instructions on the packaging.  Do not scratch your skin.  General instructions    Avoid wearing tight clothes.  Keep a journal to help find out what is causing your itching. Write down:  What you eat and drink.  What cosmetic products you use.  What soaps or detergents you use.  What you wear, including jewelry.  Use a humidifier. This keeps the air moist, which helps to prevent dry skin.  Be aware of any changes in your itchiness. Tell your health care provider about any changes.  Contact a health care provider if:  The itching does not go away after several days.  You notice redness, warmth, or drainage on the skin where you have scratched.  You are unusually thirsty or urinating more than normal.  Your skin tingles or feels numb.  Your skin or the white parts of your eyes turn yellow (jaundice).  You feel weak.  You have any of the following:  Night sweats.  Tiredness (fatigue).  Weight loss.  Abdominal pain.  Summary  Pruritus is an itchy feeling on the skin. One of the most common causes is dry skin, but many different conditions and factors can cause itching.  Apply moisturizing creams to your skin frequently, at least twice daily. Apply immediately after bathing while skin is still wet.  Take medicines or apply medicated creams only as told by your health care provider.  Do not take hot showers or baths. Do not use scented soaps, detergents, perfumes, or cosmetic products.  Keep a journal to help find out what is causing your itching.  This information is not intended to replace advice given to you by your health care provider. Make sure you discuss any questions you have with your health care provider.

## 2025-05-31 NOTE — ED PROVIDER NOTE - CLINICAL SUMMARY MEDICAL DECISION MAKING FREE TEXT BOX
Patient complaining of generalized itchy rash for the past few days.  Patient denies new medications foods soap shampoo or any other known allergen.  Patient reports no difficulty breathing or swallowing no swelling or itching of the lips mouth tongue or throat no chest pain shortness of breath coughing wheezing abdominal pain nausea vomiting or any other complaints.  PMD Lyla    Plan Benadryl Pepcid prednisone    Differential including but not limited to contact dermatitis allergic reaction no evidence of anaphylaxis

## 2025-05-31 NOTE — ED PROVIDER NOTE - PATIENT PORTAL LINK FT
You can access the FollowMyHealth Patient Portal offered by [facility] by registering at the following website: [email or web address]. By joining [employer]’s FollowMyHealth portal, you will also be able to view your health information using other applications (apps) compatible with our system.

## 2025-05-31 NOTE — ED PROVIDER NOTE - CARE PROVIDER_API CALL
Jeronimo Miller  Internal Medicine  [address], [address]  [place] [address]  Phone: [phone]  Fax: [phone]  Follow Up Time: 1-3 Days

## 2025-05-31 NOTE — ED PROVIDER NOTE - NPI NUMBER (FOR SYSADMIN USE ONLY) :
"Routine visit with BRETT Tay, and baby Indigo. Family had requested lactation help with positioning infant. At time of visit, Indigo had infant in football hold on R breast with a shield. Infant is fussy and bopping her head around.  assists with maternal hand positioning but infant is not cooperating. We switch infant to cross cradle, teaching good maternal hand positioning to support infant along with how to \"sandwich\" the breast. Infant latches on well in cross cradle on L breast, nutritive suck pattern observed. Infant nurses for a solid 15 minutes and then begins to slow down, discussed \"how to know when infant is done at the breast\".  Assisted to transfer infant to R breast in cross cradle hold. It took infant a couple of minutes to settle in to nurse but once she latched, she continued on to have a strong BF session on R side as well.     We reviewed breastfeeding positions and techniques to obtaining/maintaining a deep latch (including nose to nipple alignment and supporting infant's shoulder blades vs head when bringing infant in to latch). Discussed BF should feel like a strong \"tug or pull\" when infant is suckling and if mother experiences a \"pinching or biting\" sensation, how to un-latch infant properly, assess nipple shape and make any necessary adjustments with positioning before re-latching. Discussed physiology of milk production from colostrum through milk coming in and how the breasts should begin to feel \"heavy or full\" between day 3-5. Encouraged reviewing the provided \"Guide to Postpartum and [facility] Care\" handbook for topics including engorgement, plugged milk ducts, mastitis, safe sleep, and safety of baby. Discussed normal infant weight loss and when infant should be back to birth weight.     Answered dad's questions regarding \"how to  infant and change positions with her\". Appreciative of visit.    Daxa Combs RN  Lactation Educator            " [[phone]]

## 2025-06-14 ENCOUNTER — APPOINTMENT (OUTPATIENT)
Dept: INTERNAL MEDICINE | Facility: CLINIC | Age: 55
End: 2025-06-14
Payer: COMMERCIAL

## 2025-06-14 VITALS — SYSTOLIC BLOOD PRESSURE: 112 MMHG | DIASTOLIC BLOOD PRESSURE: 72 MMHG

## 2025-06-14 VITALS
TEMPERATURE: 97.6 F | RESPIRATION RATE: 16 BRPM | WEIGHT: 238 LBS | HEIGHT: 71 IN | BODY MASS INDEX: 33.32 KG/M2 | HEART RATE: 78 BPM | OXYGEN SATURATION: 97 %

## 2025-06-14 PROBLEM — H26.9 CATARACT, BILATERAL: Status: RESOLVED | Noted: 2025-05-17 | Resolved: 2025-06-14

## 2025-06-14 PROBLEM — R53.83 FATIGUE: Status: ACTIVE | Noted: 2025-06-14

## 2025-06-14 PROCEDURE — G2211 COMPLEX E/M VISIT ADD ON: CPT

## 2025-06-14 PROCEDURE — 99213 OFFICE O/P EST LOW 20 MIN: CPT

## 2025-06-14 PROCEDURE — 36415 COLL VENOUS BLD VENIPUNCTURE: CPT

## 2025-06-16 ENCOUNTER — APPOINTMENT (OUTPATIENT)
Dept: OPHTHALMOLOGY | Facility: AMBULATORY SURGERY CENTER | Age: 55
End: 2025-06-16

## 2025-06-16 LAB
25(OH)D3 SERPL-MCNC: 20.7 NG/ML
ALBUMIN SERPL ELPH-MCNC: 4.6 G/DL
ALP BLD-CCNC: 109 U/L
ALT SERPL-CCNC: 47 U/L
ANION GAP SERPL CALC-SCNC: 12 MMOL/L
AST SERPL-CCNC: 35 U/L
BASOPHILS # BLD AUTO: 0.04 K/UL
BASOPHILS NFR BLD AUTO: 0.9 %
BILIRUB SERPL-MCNC: 0.6 MG/DL
BUN SERPL-MCNC: 18 MG/DL
CALCIUM SERPL-MCNC: 9.7 MG/DL
CHLORIDE SERPL-SCNC: 103 MMOL/L
CHOLEST SERPL-MCNC: 106 MG/DL
CO2 SERPL-SCNC: 26 MMOL/L
CREAT SERPL-MCNC: 1.04 MG/DL
EGFRCR SERPLBLD CKD-EPI 2021: 85 ML/MIN/1.73M2
EOSINOPHIL # BLD AUTO: 0.15 K/UL
EOSINOPHIL NFR BLD AUTO: 3.5 %
ESTIMATED AVERAGE GLUCOSE: 117 MG/DL
FOLATE SERPL-MCNC: >20 NG/ML
GLUCOSE SERPL-MCNC: 103 MG/DL
HBA1C MFR BLD HPLC: 5.7 %
HCT VFR BLD CALC: 44.7 %
HDLC SERPL-MCNC: 37 MG/DL
HGB BLD-MCNC: 14.2 G/DL
IMM GRANULOCYTES NFR BLD AUTO: 0.2 %
LDLC SERPL-MCNC: 50 MG/DL
LYMPHOCYTES # BLD AUTO: 1.69 K/UL
LYMPHOCYTES NFR BLD AUTO: 39 %
MAN DIFF?: NORMAL
MCHC RBC-ENTMCNC: 27.6 PG
MCHC RBC-ENTMCNC: 31.8 G/DL
MCV RBC AUTO: 87 FL
MONOCYTES # BLD AUTO: 0.58 K/UL
MONOCYTES NFR BLD AUTO: 13.4 %
NEUTROPHILS # BLD AUTO: 1.86 K/UL
NEUTROPHILS NFR BLD AUTO: 43 %
NONHDLC SERPL-MCNC: 70 MG/DL
PLATELET # BLD AUTO: 308 K/UL
POTASSIUM SERPL-SCNC: 4.8 MMOL/L
PROT SERPL-MCNC: 7.2 G/DL
RBC # BLD: 5.14 M/UL
RBC # FLD: 13.1 %
SODIUM SERPL-SCNC: 141 MMOL/L
TRIGL SERPL-MCNC: 104 MG/DL
TSH SERPL-ACNC: 0.91 UIU/ML
VIT B12 SERPL-MCNC: 568 PG/ML
WBC # FLD AUTO: 4.33 K/UL

## 2025-06-16 PROCEDURE — 66984 XCAPSL CTRC RMVL W/O ECP: CPT | Mod: RT

## 2025-06-16 NOTE — ED ADULT NURSE NOTE - NS TRANSFER PATIENT BELONGINGS
Mr Rausch was notified on Friday that despite labs/EKG being completed, he still needed to present for his pre-op visit in office today. His pre-op clearance was due 6/3 according to his paperwork, and his cataract surgery is scheduled for tomorrow, 6/17.    I called [facility] office this morning to confirm that he does require cardiac clearance based on his cardiac history.    Spoke with MEL mcfarlane - he did not follow up 1 week after stenting in March as instructed. They cannot clear him without seeing him in the office.    He was a no-show for his pre-op visit today.  
Cell Phone/PDA (specify)/Clothing

## 2025-06-17 ENCOUNTER — NON-APPOINTMENT (OUTPATIENT)
Age: 55
End: 2025-06-17

## 2025-06-17 ENCOUNTER — APPOINTMENT (OUTPATIENT)
Dept: OPHTHALMOLOGY | Facility: CLINIC | Age: 55
End: 2025-06-17
Payer: COMMERCIAL

## 2025-06-17 PROCEDURE — 99024 POSTOP FOLLOW-UP VISIT: CPT

## 2025-06-18 ENCOUNTER — TRANSCRIPTION ENCOUNTER (OUTPATIENT)
Age: 55
End: 2025-06-18

## 2025-06-18 PROCEDURE — 93248 EXT ECG>7D<15D REV&INTERPJ: CPT

## 2025-06-18 NOTE — OCCUPATIONAL THERAPY INITIAL EVALUATION ADULT - LEVEL OF INDEPENDENCE: TOILET, REHAB EVAL
Called and left a vm for the pt making them aware that it was ok to hold off on an appt for now.   independent

## 2025-06-24 ENCOUNTER — NON-APPOINTMENT (OUTPATIENT)
Age: 55
End: 2025-06-24

## 2025-06-24 ENCOUNTER — APPOINTMENT (OUTPATIENT)
Dept: OPHTHALMOLOGY | Facility: CLINIC | Age: 55
End: 2025-06-24
Payer: COMMERCIAL

## 2025-06-24 PROCEDURE — 99024 POSTOP FOLLOW-UP VISIT: CPT

## 2025-06-30 ENCOUNTER — APPOINTMENT (OUTPATIENT)
Dept: INTERNAL MEDICINE | Facility: CLINIC | Age: 55
End: 2025-06-30
Payer: COMMERCIAL

## 2025-06-30 ENCOUNTER — TRANSCRIPTION ENCOUNTER (OUTPATIENT)
Age: 55
End: 2025-06-30

## 2025-06-30 PROBLEM — H26.9 CATARACT: Status: ACTIVE | Noted: 2025-06-30

## 2025-06-30 PROBLEM — G47.33 OSA (OBSTRUCTIVE SLEEP APNEA): Status: ACTIVE | Noted: 2025-06-14

## 2025-06-30 PROBLEM — E66.811 CLASS 1 OBESITY: Status: ACTIVE | Noted: 2025-06-14

## 2025-06-30 PROCEDURE — 99213 OFFICE O/P EST LOW 20 MIN: CPT | Mod: 95

## 2025-06-30 PROCEDURE — G2211 COMPLEX E/M VISIT ADD ON: CPT | Mod: 95

## 2025-07-07 ENCOUNTER — APPOINTMENT (OUTPATIENT)
Dept: OPHTHALMOLOGY | Facility: AMBULATORY SURGERY CENTER | Age: 55
End: 2025-07-07
Payer: COMMERCIAL

## 2025-07-07 PROCEDURE — 66984 XCAPSL CTRC RMVL W/O ECP: CPT | Mod: LT,79

## 2025-07-08 ENCOUNTER — TRANSCRIPTION ENCOUNTER (OUTPATIENT)
Age: 55
End: 2025-07-08

## 2025-07-08 ENCOUNTER — RESULT REVIEW (OUTPATIENT)
Age: 55
End: 2025-07-08

## 2025-07-08 ENCOUNTER — NON-APPOINTMENT (OUTPATIENT)
Age: 55
End: 2025-07-08

## 2025-07-08 ENCOUNTER — APPOINTMENT (OUTPATIENT)
Dept: OPHTHALMOLOGY | Facility: CLINIC | Age: 55
End: 2025-07-08
Payer: COMMERCIAL

## 2025-07-08 ENCOUNTER — INPATIENT (INPATIENT)
Facility: HOSPITAL | Age: 55
LOS: 1 days | Discharge: HOME | DRG: 815 | End: 2025-07-10
Attending: HOSPITALIST | Admitting: HOSPITALIST
Payer: COMMERCIAL

## 2025-07-08 VITALS
TEMPERATURE: 99 F | DIASTOLIC BLOOD PRESSURE: 80 MMHG | OXYGEN SATURATION: 98 % | HEIGHT: 71 IN | SYSTOLIC BLOOD PRESSURE: 132 MMHG | RESPIRATION RATE: 19 BRPM | HEART RATE: 76 BPM | WEIGHT: 203.49 LBS

## 2025-07-08 DIAGNOSIS — Z98.890 OTHER SPECIFIED POSTPROCEDURAL STATES: Chronic | ICD-10-CM

## 2025-07-08 DIAGNOSIS — D73.5 INFARCTION OF SPLEEN: ICD-10-CM

## 2025-07-08 LAB
ALBUMIN SERPL ELPH-MCNC: 4 G/DL — SIGNIFICANT CHANGE UP (ref 3.3–5)
ALP SERPL-CCNC: 98 U/L — SIGNIFICANT CHANGE UP (ref 30–120)
ALT FLD-CCNC: 62 U/L — HIGH (ref 10–60)
ANION GAP SERPL CALC-SCNC: 7 MMOL/L — SIGNIFICANT CHANGE UP (ref 5–17)
APPEARANCE UR: CLEAR — SIGNIFICANT CHANGE UP
APTT BLD: 141.7 SEC — CRITICAL HIGH (ref 26.1–36.8)
APTT BLD: 28.1 SEC — SIGNIFICANT CHANGE UP (ref 26.1–36.8)
AST SERPL-CCNC: 54 U/L — HIGH (ref 10–40)
BASOPHILS # BLD AUTO: 0.02 K/UL — SIGNIFICANT CHANGE UP (ref 0–0.2)
BASOPHILS NFR BLD AUTO: 0.2 % — SIGNIFICANT CHANGE UP (ref 0–2)
BILIRUB SERPL-MCNC: 0.7 MG/DL — SIGNIFICANT CHANGE UP (ref 0.2–1.2)
BILIRUB UR-MCNC: NEGATIVE — SIGNIFICANT CHANGE UP
BUN SERPL-MCNC: 21 MG/DL — SIGNIFICANT CHANGE UP (ref 7–23)
CALCIUM SERPL-MCNC: 9.6 MG/DL — SIGNIFICANT CHANGE UP (ref 8.4–10.5)
CHLORIDE SERPL-SCNC: 101 MMOL/L — SIGNIFICANT CHANGE UP (ref 96–108)
CO2 SERPL-SCNC: 33 MMOL/L — HIGH (ref 22–31)
COLOR SPEC: YELLOW — SIGNIFICANT CHANGE UP
CREAT SERPL-MCNC: 1.06 MG/DL — SIGNIFICANT CHANGE UP (ref 0.5–1.3)
DIFF PNL FLD: NEGATIVE — SIGNIFICANT CHANGE UP
EGFR: 83 ML/MIN/1.73M2 — SIGNIFICANT CHANGE UP
EGFR: 83 ML/MIN/1.73M2 — SIGNIFICANT CHANGE UP
EOSINOPHIL # BLD AUTO: 0.06 K/UL — SIGNIFICANT CHANGE UP (ref 0–0.5)
EOSINOPHIL NFR BLD AUTO: 0.6 % — SIGNIFICANT CHANGE UP (ref 0–6)
GLUCOSE SERPL-MCNC: 116 MG/DL — HIGH (ref 70–99)
GLUCOSE UR QL: NEGATIVE MG/DL — SIGNIFICANT CHANGE UP
HCT VFR BLD CALC: 43.2 % — SIGNIFICANT CHANGE UP (ref 39–50)
HCT VFR BLD CALC: 47.4 % — SIGNIFICANT CHANGE UP (ref 39–50)
HGB BLD-MCNC: 13.9 G/DL — SIGNIFICANT CHANGE UP (ref 13–17)
HGB BLD-MCNC: 14.9 G/DL — SIGNIFICANT CHANGE UP (ref 13–17)
IMM GRANULOCYTES # BLD AUTO: 0.02 K/UL — SIGNIFICANT CHANGE UP (ref 0–0.07)
IMM GRANULOCYTES NFR BLD AUTO: 0.2 % — SIGNIFICANT CHANGE UP (ref 0–0.9)
INR BLD: 1.06 RATIO — SIGNIFICANT CHANGE UP (ref 0.85–1.16)
KETONES UR QL: ABNORMAL MG/DL
LEUKOCYTE ESTERASE UR-ACNC: NEGATIVE — SIGNIFICANT CHANGE UP
LIDOCAIN IGE QN: 27 U/L — SIGNIFICANT CHANGE UP (ref 16–77)
LYMPHOCYTES # BLD AUTO: 1.95 K/UL — SIGNIFICANT CHANGE UP (ref 1–3.3)
LYMPHOCYTES NFR BLD AUTO: 20.1 % — SIGNIFICANT CHANGE UP (ref 13–44)
MCHC RBC-ENTMCNC: 27.2 PG — SIGNIFICANT CHANGE UP (ref 27–34)
MCHC RBC-ENTMCNC: 27.5 PG — SIGNIFICANT CHANGE UP (ref 27–34)
MCHC RBC-ENTMCNC: 31.4 G/DL — LOW (ref 32–36)
MCHC RBC-ENTMCNC: 32.2 G/DL — SIGNIFICANT CHANGE UP (ref 32–36)
MCV RBC AUTO: 85.5 FL — SIGNIFICANT CHANGE UP (ref 80–100)
MCV RBC AUTO: 86.7 FL — SIGNIFICANT CHANGE UP (ref 80–100)
MONOCYTES # BLD AUTO: 1.02 K/UL — HIGH (ref 0–0.9)
MONOCYTES NFR BLD AUTO: 10.5 % — SIGNIFICANT CHANGE UP (ref 2–14)
NEUTROPHILS # BLD AUTO: 6.64 K/UL — SIGNIFICANT CHANGE UP (ref 1.8–7.4)
NEUTROPHILS NFR BLD AUTO: 68.4 % — SIGNIFICANT CHANGE UP (ref 43–77)
NITRITE UR-MCNC: NEGATIVE — SIGNIFICANT CHANGE UP
NRBC # BLD AUTO: 0 K/UL — SIGNIFICANT CHANGE UP (ref 0–0)
NRBC # BLD AUTO: 0 K/UL — SIGNIFICANT CHANGE UP (ref 0–0)
NRBC # FLD: 0 K/UL — SIGNIFICANT CHANGE UP (ref 0–0)
NRBC # FLD: 0 K/UL — SIGNIFICANT CHANGE UP (ref 0–0)
NRBC BLD AUTO-RTO: 0 /100 WBCS — SIGNIFICANT CHANGE UP (ref 0–0)
NRBC BLD AUTO-RTO: 0 /100 WBCS — SIGNIFICANT CHANGE UP (ref 0–0)
PH UR: 5 — SIGNIFICANT CHANGE UP (ref 5–8)
PLATELET # BLD AUTO: 254 K/UL — SIGNIFICANT CHANGE UP (ref 150–400)
PLATELET # BLD AUTO: 323 K/UL — SIGNIFICANT CHANGE UP (ref 150–400)
PMV BLD: 8.6 FL — SIGNIFICANT CHANGE UP (ref 7–13)
PMV BLD: 8.9 FL — SIGNIFICANT CHANGE UP (ref 7–13)
POTASSIUM SERPL-MCNC: 4.1 MMOL/L — SIGNIFICANT CHANGE UP (ref 3.5–5.3)
POTASSIUM SERPL-SCNC: 4.1 MMOL/L — SIGNIFICANT CHANGE UP (ref 3.5–5.3)
PROT SERPL-MCNC: 7.8 G/DL — SIGNIFICANT CHANGE UP (ref 6–8.3)
PROT UR-MCNC: NEGATIVE MG/DL — SIGNIFICANT CHANGE UP
PROTHROM AB SERPL-ACNC: 12.3 SEC — SIGNIFICANT CHANGE UP (ref 9.9–13.4)
RBC # BLD: 5.05 M/UL — SIGNIFICANT CHANGE UP (ref 4.2–5.8)
RBC # BLD: 5.47 M/UL — SIGNIFICANT CHANGE UP (ref 4.2–5.8)
RBC # FLD: 13.2 % — SIGNIFICANT CHANGE UP (ref 10.3–14.5)
RBC # FLD: 13.4 % — SIGNIFICANT CHANGE UP (ref 10.3–14.5)
SODIUM SERPL-SCNC: 141 MMOL/L — SIGNIFICANT CHANGE UP (ref 135–145)
SP GR SPEC: 1.02 — SIGNIFICANT CHANGE UP (ref 1–1.03)
UROBILINOGEN FLD QL: 1 MG/DL — SIGNIFICANT CHANGE UP (ref 0.2–1)
WBC # BLD: 8.94 K/UL — SIGNIFICANT CHANGE UP (ref 3.8–10.5)
WBC # BLD: 9.71 K/UL — SIGNIFICANT CHANGE UP (ref 3.8–10.5)
WBC # FLD AUTO: 8.94 K/UL — SIGNIFICANT CHANGE UP (ref 3.8–10.5)
WBC # FLD AUTO: 9.71 K/UL — SIGNIFICANT CHANGE UP (ref 3.8–10.5)

## 2025-07-08 PROCEDURE — 74177 CT ABD & PELVIS W/CONTRAST: CPT | Mod: 26

## 2025-07-08 PROCEDURE — 99285 EMERGENCY DEPT VISIT HI MDM: CPT

## 2025-07-08 PROCEDURE — 83690 ASSAY OF LIPASE: CPT

## 2025-07-08 PROCEDURE — 99024 POSTOP FOLLOW-UP VISIT: CPT

## 2025-07-08 PROCEDURE — 85730 THROMBOPLASTIN TIME PARTIAL: CPT

## 2025-07-08 PROCEDURE — 93970 EXTREMITY STUDY: CPT | Mod: 26

## 2025-07-08 PROCEDURE — 93970 EXTREMITY STUDY: CPT

## 2025-07-08 PROCEDURE — 85027 COMPLETE CBC AUTOMATED: CPT

## 2025-07-08 PROCEDURE — 81003 URINALYSIS AUTO W/O SCOPE: CPT

## 2025-07-08 PROCEDURE — 85025 COMPLETE CBC W/AUTO DIFF WBC: CPT

## 2025-07-08 PROCEDURE — 80053 COMPREHEN METABOLIC PANEL: CPT

## 2025-07-08 PROCEDURE — 93010 ELECTROCARDIOGRAM REPORT: CPT

## 2025-07-08 PROCEDURE — 85610 PROTHROMBIN TIME: CPT

## 2025-07-08 PROCEDURE — 93356 MYOCRD STRAIN IMG SPCKL TRCK: CPT

## 2025-07-08 PROCEDURE — 99223 1ST HOSP IP/OBS HIGH 75: CPT

## 2025-07-08 PROCEDURE — 74177 CT ABD & PELVIS W/CONTRAST: CPT

## 2025-07-08 PROCEDURE — 93306 TTE W/DOPPLER COMPLETE: CPT | Mod: 26

## 2025-07-08 PROCEDURE — 36415 COLL VENOUS BLD VENIPUNCTURE: CPT

## 2025-07-08 RX ORDER — PIPERACILLIN-TAZO-DEXTROSE,ISO 3.375G/5
3.38 IV SOLUTION, PIGGYBACK PREMIX FROZEN(ML) INTRAVENOUS EVERY 8 HOURS
Refills: 0 | Status: DISCONTINUED | OUTPATIENT
Start: 2025-07-08 | End: 2025-07-09

## 2025-07-08 RX ORDER — ONDANSETRON HCL/PF 4 MG/2 ML
4 VIAL (ML) INJECTION EVERY 8 HOURS
Refills: 0 | Status: DISCONTINUED | OUTPATIENT
Start: 2025-07-08 | End: 2025-07-10

## 2025-07-08 RX ORDER — HEPARIN SODIUM 1000 [USP'U]/ML
3500 INJECTION INTRAVENOUS; SUBCUTANEOUS EVERY 6 HOURS
Refills: 0 | Status: DISCONTINUED | OUTPATIENT
Start: 2025-07-08 | End: 2025-07-10

## 2025-07-08 RX ORDER — ACETAMINOPHEN 500 MG/5ML
650 LIQUID (ML) ORAL EVERY 6 HOURS
Refills: 0 | Status: DISCONTINUED | OUTPATIENT
Start: 2025-07-08 | End: 2025-07-10

## 2025-07-08 RX ORDER — PIPERACILLIN-TAZO-DEXTROSE,ISO 3.375G/5
3.38 IV SOLUTION, PIGGYBACK PREMIX FROZEN(ML) INTRAVENOUS ONCE
Refills: 0 | Status: COMPLETED | OUTPATIENT
Start: 2025-07-08 | End: 2025-07-08

## 2025-07-08 RX ORDER — VANCOMYCIN HCL IN 5 % DEXTROSE 1.5G/250ML
1500 PLASTIC BAG, INJECTION (ML) INTRAVENOUS EVERY 12 HOURS
Refills: 0 | Status: DISCONTINUED | OUTPATIENT
Start: 2025-07-09 | End: 2025-07-09

## 2025-07-08 RX ORDER — MELATONIN 5 MG
3 TABLET ORAL AT BEDTIME
Refills: 0 | Status: DISCONTINUED | OUTPATIENT
Start: 2025-07-08 | End: 2025-07-10

## 2025-07-08 RX ORDER — MYCOPHENOLATE MOFETIL 500 MG/1
1000 TABLET, FILM COATED ORAL AT BEDTIME
Refills: 0 | Status: DISCONTINUED | OUTPATIENT
Start: 2025-07-08 | End: 2025-07-10

## 2025-07-08 RX ORDER — AMLODIPINE BESYLATE 10 MG/1
5 TABLET ORAL DAILY
Refills: 0 | Status: DISCONTINUED | OUTPATIENT
Start: 2025-07-08 | End: 2025-07-10

## 2025-07-08 RX ORDER — HEPARIN SODIUM 1000 [USP'U]/ML
7500 INJECTION INTRAVENOUS; SUBCUTANEOUS EVERY 6 HOURS
Refills: 0 | Status: DISCONTINUED | OUTPATIENT
Start: 2025-07-08 | End: 2025-07-10

## 2025-07-08 RX ORDER — VANCOMYCIN HCL IN 5 % DEXTROSE 1.5G/250ML
1500 PLASTIC BAG, INJECTION (ML) INTRAVENOUS ONCE
Refills: 0 | Status: COMPLETED | OUTPATIENT
Start: 2025-07-08 | End: 2025-07-08

## 2025-07-08 RX ORDER — HEPARIN SODIUM 1000 [USP'U]/ML
7500 INJECTION INTRAVENOUS; SUBCUTANEOUS ONCE
Refills: 0 | Status: COMPLETED | OUTPATIENT
Start: 2025-07-08 | End: 2025-07-08

## 2025-07-08 RX ORDER — ACETAMINOPHEN 500 MG/5ML
1000 LIQUID (ML) ORAL ONCE
Refills: 0 | Status: COMPLETED | OUTPATIENT
Start: 2025-07-08 | End: 2025-07-08

## 2025-07-08 RX ORDER — VANCOMYCIN HCL IN 5 % DEXTROSE 1.5G/250ML
PLASTIC BAG, INJECTION (ML) INTRAVENOUS
Refills: 0 | Status: DISCONTINUED | OUTPATIENT
Start: 2025-07-08 | End: 2025-07-09

## 2025-07-08 RX ORDER — AMLODIPINE BESYLATE 10 MG/1
1 TABLET ORAL
Refills: 0 | DISCHARGE

## 2025-07-08 RX ORDER — HEPARIN SODIUM 1000 [USP'U]/ML
INJECTION INTRAVENOUS; SUBCUTANEOUS
Qty: 25000 | Refills: 0 | Status: DISCONTINUED | OUTPATIENT
Start: 2025-07-08 | End: 2025-07-10

## 2025-07-08 RX ORDER — MAGNESIUM, ALUMINUM HYDROXIDE 200-200 MG
30 TABLET,CHEWABLE ORAL EVERY 4 HOURS
Refills: 0 | Status: DISCONTINUED | OUTPATIENT
Start: 2025-07-08 | End: 2025-07-10

## 2025-07-08 RX ORDER — ATORVASTATIN CALCIUM 80 MG/1
80 TABLET, FILM COATED ORAL AT BEDTIME
Refills: 0 | Status: DISCONTINUED | OUTPATIENT
Start: 2025-07-08 | End: 2025-07-10

## 2025-07-08 RX ORDER — OFLOXACIN 3 MG/ML
1 SOLUTION OPHTHALMIC
Refills: 0 | Status: DISCONTINUED | OUTPATIENT
Start: 2025-07-08 | End: 2025-07-10

## 2025-07-08 RX ORDER — MYCOPHENOLATE MOFETIL 500 MG/1
1500 TABLET, FILM COATED ORAL DAILY
Refills: 0 | Status: DISCONTINUED | OUTPATIENT
Start: 2025-07-08 | End: 2025-07-10

## 2025-07-08 RX ADMIN — OFLOXACIN 1 DROP(S): 3 SOLUTION OPHTHALMIC at 19:17

## 2025-07-08 RX ADMIN — Medication 200 GRAM(S): at 22:31

## 2025-07-08 RX ADMIN — Medication 2 MILLIGRAM(S): at 16:58

## 2025-07-08 RX ADMIN — Medication 1000 MILLIGRAM(S): at 12:00

## 2025-07-08 RX ADMIN — HEPARIN SODIUM 1700 UNIT(S)/HR: 1000 INJECTION INTRAVENOUS; SUBCUTANEOUS at 21:37

## 2025-07-08 RX ADMIN — HEPARIN SODIUM 1700 UNIT(S)/HR: 1000 INJECTION INTRAVENOUS; SUBCUTANEOUS at 13:59

## 2025-07-08 RX ADMIN — Medication 300 MILLIGRAM(S): at 22:31

## 2025-07-08 RX ADMIN — Medication 400 MILLIGRAM(S): at 11:40

## 2025-07-08 RX ADMIN — Medication 1 DROP(S): at 23:22

## 2025-07-08 RX ADMIN — Medication 2 MILLIGRAM(S): at 23:30

## 2025-07-08 RX ADMIN — Medication 2 MILLIGRAM(S): at 16:28

## 2025-07-08 RX ADMIN — Medication 3 MILLIGRAM(S): at 21:09

## 2025-07-08 RX ADMIN — HEPARIN SODIUM 1700 UNIT(S)/HR: 1000 INJECTION INTRAVENOUS; SUBCUTANEOUS at 19:10

## 2025-07-08 RX ADMIN — AMLODIPINE BESYLATE 5 MILLIGRAM(S): 10 TABLET ORAL at 18:19

## 2025-07-08 RX ADMIN — Medication 1000 MILLIGRAM(S): at 12:15

## 2025-07-08 RX ADMIN — Medication 1000 MILLILITER(S): at 11:39

## 2025-07-08 RX ADMIN — Medication 2 MILLIGRAM(S): at 22:31

## 2025-07-08 RX ADMIN — MYCOPHENOLATE MOFETIL 1000 MILLIGRAM(S): 500 TABLET, FILM COATED ORAL at 21:09

## 2025-07-08 RX ADMIN — Medication 1000 MILLILITER(S): at 12:59

## 2025-07-08 RX ADMIN — HEPARIN SODIUM 7500 UNIT(S): 1000 INJECTION INTRAVENOUS; SUBCUTANEOUS at 13:58

## 2025-07-08 RX ADMIN — ATORVASTATIN CALCIUM 80 MILLIGRAM(S): 80 TABLET, FILM COATED ORAL at 21:09

## 2025-07-08 RX ADMIN — Medication 1 DROP(S): at 19:17

## 2025-07-08 RX ADMIN — MYCOPHENOLATE MOFETIL 1500 MILLIGRAM(S): 500 TABLET, FILM COATED ORAL at 18:09

## 2025-07-08 RX ADMIN — HEPARIN SODIUM 1700 UNIT(S)/HR: 1000 INJECTION INTRAVENOUS; SUBCUTANEOUS at 15:05

## 2025-07-08 RX ADMIN — HEPARIN SODIUM 0 UNIT(S)/HR: 1000 INJECTION INTRAVENOUS; SUBCUTANEOUS at 22:38

## 2025-07-08 RX ADMIN — OFLOXACIN 1 DROP(S): 3 SOLUTION OPHTHALMIC at 23:22

## 2025-07-08 NOTE — ED PROVIDER NOTE - OBJECTIVE STATEMENT
Patient complaining of left lower quadrant abdominal pain since yesterday.  Patient denies fevers chills nausea vomiting diarrhea dysuria hematuria frequency.  Patient relates he had cataract surgery on his left eye yesterday symptoms began after he was home from the surgery.  STANLEY Arita

## 2025-07-08 NOTE — ED PROVIDER NOTE - ABDOMINAL TENDER
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 Admitted for sepsis w/u and treatment   Sepsis unclear source , plan to IR removal mediport   C/w Atb , f/w cultures   Sepsis protocol   F/w labs   Correct electrolytes PRN  DVT px  D/w daughter at bedside 9/26.

## 2025-07-08 NOTE — ED PROVIDER NOTE - DIFFERENTIAL DIAGNOSIS
Differential Diagnosis Differential including but not limited to UTI pyelonephritis diverticulitis pancreatitis enteritis colitis

## 2025-07-08 NOTE — ED PROVIDER NOTE - EYES, MLM
Clear bilaterally, pupils equal, round and reactive to light. no scleral icterus Curvilinear Excision Additional Text (Leave Blank If You Do Not Want): The margin was drawn around the clinically apparent lesion.  A curvilinear shape was then drawn on the skin incorporating the lesion and margins.  Incisions were then made along these lines to the appropriate tissue plane and the lesion was extirpated.

## 2025-07-08 NOTE — H&P ADULT - HISTORY OF PRESENT ILLNESS
Patient is a 55y Male with a hx iof HTN, and Afib s/p multiple ablations, atrial flutter s/p ablation, CVA, ostium secundum ASD, hx of RA and scleritis complaining of left lower quadrant abdominal pain since yesterday.  Patient denies fevers chills nausea vomiting diarrhea dysuria hematuria frequency.  Patient relates he had cataract surgery on his left eye yesterday symptoms began after he was home from the surgery.  Pt's CT done in ER shows  Mild splenomegaly.  Wedge-shaped subcapsular hypodensity in the spleen, suggestive of  infarction. Wedge-shaped subcapsular hypodensities in the left kidney, suggestive of infarction.    Pt was started on IV Heparin, hematology and Cardiology consulted. Pt admitted to medicine service for further mgmt.

## 2025-07-08 NOTE — H&P ADULT - NSHPPHYSICALEXAM_GEN_ALL_CORE
T(C): 36.7 (07-08-25 @ 15:00), Max: 37.2 (07-08-25 @ 10:29)  HR: 78 (07-08-25 @ 15:00) (76 - 78)  BP: 139/82 (07-08-25 @ 15:00) (132/80 - 139/82)  RR: 18 (07-08-25 @ 15:00) (18 - 19)  SpO2: 97% (07-08-25 @ 15:00) (97% - 98%)    CONSTITUTIONAL: Well groomed, no apparent distress  EYES: PERRLA and symmetric, EOMI, No conjunctival or scleral injection, non-icteric  ENMT: Oral mucosa with moist membranes. Normal dentition; no pharyngeal injection or exudates             NECK: Supple, symmetric and without tracheal deviation   RESP: No respiratory distress, no use of accessory muscles; CTA b/l, no WRR  CV: RRR, +S1S2, no MRG; no JVD; no peripheral edema  GI: Soft, NT, ND, no rebound, no guarding; no palpable masses; no hepatosplenomegaly; no hernia palpated  LYMPH: No cervical LAD or tenderness; no axillary LAD or tenderness; no inguinal LAD or tenderness  MSK: Normal gait; No digital clubbing or cyanosis; examination of the (head/neck/spine/ribs/pelvis, RUE, LUE, RLE, LLE) without misalignment,            Normal ROM without pain, no spinal tenderness, normal muscle strength/tone  SKIN: No rashes or ulcers noted; no subcutaneous nodules or induration palpable  NEURO: CN II-XII intact; normal reflexes in upper and lower extremities, sensation intact in upper and lower extremities b/l to light touch   PSYCH: Appropriate insight/judgment; A+O x 3, mood and affect appropriate, recent/remote memory intact

## 2025-07-08 NOTE — ED PROVIDER NOTE - CLINICAL SUMMARY MEDICAL DECISION MAKING FREE TEXT BOX
Patient complaining of left lower quadrant abdominal pain since yesterday.  Patient denies fevers chills nausea vomiting diarrhea dysuria hematuria frequency.  Patient relates he had cataract surgery on his left eye yesterday symptoms began after he was home from the surgery.  PMD Lyla    Plan labs CT abdomen pelvis IV fluids Ofirmev    Differential including but not limited to UTI pyelonephritis diverticulitis pancreatitis enteritis colitis

## 2025-07-08 NOTE — CHART NOTE - NSCHARTNOTEFT_GEN_A_CORE
Patient requested to see house physician.  Evaluated patient at bedside, he noted ongoing LUQ pain and significant chills.      Temp 102 oral, 120/80 60 16 98% on RA  Patient appears comfortable  Lungs CTAB  Heart no murmur heard  Abd +tenderness LUQ without rebound or rigidity  Ext no edema    A/P: Abd pain due to splenic and kidney infarctions  - now with fever up to 102  - blood cultures x 2 sets  - ? developing splenic abscess  - IV Zosyn and Vanco  - ID eval - I will notify in AM Patient requested to see house physician.  Evaluated patient at bedside, he noted ongoing LUQ pain and significant chills.      Temp 102 oral, 120/80 80 16 98% on RA  Patient appears comfortable currently  Lungs CTAB  Heart no murmur heard  Abd +tenderness LUQ without rebound or rigidity  Ext no edema    A/P: Abd pain due to splenic and kidney infarctions  - now with fever up to 102, HR only 80 - on Calcium Channel Blocker  - blood cultures x 2 sets  - ? developing splenic abscess  - IV Zosyn and Vanco  - ID eval - I will notify in AM

## 2025-07-08 NOTE — ED ADULT NURSE NOTE - OBJECTIVE STATEMENT
55yr old male walked into ED c/o left side abdominal pain since yesterday; denies n/v/d 55yr old male walked into ED c/o left side abdominal pain since yesterday; denies n/v/d. pt states he had cataract surgery yesterday at [facility]. report he was told by his cardiologist to stop is Eliquis 2 days prior. pt tastes immediately after he procedure he felt sharp abd pains.

## 2025-07-08 NOTE — H&P ADULT - ASSESSMENT
Patient is a 55y Male with a hx iof HTN, and Afib s/p multiple ablations, atrial flutter s/p ablation, CVA, ostium secundum ASD, hx of RA and scleritis complaining of left lower quadrant abdominal pain since yesterday.  Patient denies fevers chills nausea vomiting diarrhea dysuria hematuria frequency.  Patient relates he had cataract surgery on his left eye yesterday symptoms began after he was home from the surgery.  Pt's CT done in ER shows  Mild splenomegaly.  Wedge-shaped subcapsular hypodensity in the spleen, suggestive of  infarction. Wedge-shaped subcapsular hypodensities in the left kidney, suggestive of infarction.    Pt was started on IV Heparin, hematology and Cardiology consulted. Pt admitted to medicine service for further mgmt.      Splenic and Renal Infarcts  Pt started on IV heparin  Hematology consulted  morphine 2gm prn for moderate pain  Cont tylenol for mild to moderate pain    Hx of Afib  currently in sinus rythm  Cardiology input appreciated as per Card" Unclear if the ASD is congential or iatrogenic from prior ablation  - Regardless, there is a high possibility that the patient's recent thromboembolic issues in multiple vascular territories (brain, spleen, kidney) are related to this.  Echo ordered  LE venous doppler will be ordered    Hx of HTN  cont home meds    d/w pt       Patient is a 55y Male with a hx iof HTN, and Afib s/p multiple ablations, atrial flutter s/p ablation, CVA, ostium secundum ASD, hx of RA and scleritis complaining of left lower quadrant abdominal pain since yesterday.  Patient denies fevers chills nausea vomiting diarrhea dysuria hematuria frequency.  Patient relates he had cataract surgery on his left eye yesterday symptoms began after he was home from the surgery.  Pt's CT done in ER shows  Mild splenomegaly.  Wedge-shaped subcapsular hypodensity in the spleen, suggestive of  infarction. Wedge-shaped subcapsular hypodensities in the left kidney, suggestive of infarction.    Pt was started on IV Heparin, hematology and Cardiology consulted. Pt admitted to medicine service for further mgmt.      Splenic and Renal Infarcts  Pt started on IV heparin  Hematology consulted  morphine 2gm prn for moderate pain  Cont tylenol for mild to moderate pain    Hx of Afib  currently in sinus rythm  Cardiology input appreciated as per Card" Unclear if the ASD is congential or iatrogenic from prior ablation  - Regardless, there is a high possibility that the patient's recent thromboembolic issues in multiple vascular territories (brain, spleen, kidney) are related to this.  Echo ordered  LE venous doppler will be ordered    Hx of HTN  cont home meds    Hx of RA  takes  Mycophenolate 1500mg in the morning and 1000 at night time.      d/w pt

## 2025-07-08 NOTE — PATIENT PROFILE ADULT - HAVE YOU BEEN EATING POORLY BECAUSE OF A DECREASED APPETITE?
PRINCIPAL PROCEDURE  Procedure: Transthoracic echocardiography (TTE)  Findings and Treatment: CONCLUSIONS:      1. Left ventricular cavity is normal in size. Left ventricular wall thickness is normal. Left ventricular systolic function is normal with an ejection fraction of 63 % by 3D with an ejection fraction visually estimated at 60 to 65 %.   2. Normal left ventricular diastolic function.  3. Normal right ventricular cavity size, with normal wall thickness, and normal systolic function.   4. Trace mitral regurgitation.   5. Trace tricuspid regurgitation.   6. Estimated pulmonary artery systolic pressure is 16 mmHg.   7. Interatrial septum is aneurysmal.   8. Interatrial septum is stretched and displaced to the right, consistent with left atrial volume overload.   9. Trace pulmonic regurgitation.      SECONDARY PROCEDURE  Procedure: Brain MRI  Findings and Treatment: Findings:  There is no acute infarct. There is no evidence of mass or intracranial   hemorrhage. Ventricles and sulci are normal in size and configuration for   the patient's stated age. One or two scatteredtiny ventral and   subcortical white matter FLAIR hyperintensities are entirely nonspecific,   although likely due to chronic microangiopathy. No midline shift or other   significant mass effect is noted. Gray-white differentiation is   maintained throughout. Normal flow voids are maintained in the dominant   arterial and venous structures.  There is trace layering secretions in the left sphenoid sinus, the   remaining paranasal sinuses and tympanomastoid spaces are clear. Orbits   and orbital contents are unremarkable.  IMPRESSION:  No acute infarct.       No (0)

## 2025-07-08 NOTE — ED ADULT TRIAGE NOTE - CHIEF COMPLAINT QUOTE
I had cataract surgery yesterday and when I got home yesterday I started having abdominal pain; denies v/d

## 2025-07-08 NOTE — ED PROVIDER NOTE - PROGRESS NOTE DETAILS
Discussed with Dr. Ramachandran (attending hematologist) recommends IV heparin and admit  Discussed with Dr. Spencer (attending cardiologist) he will consult  Discussed with Dr. Bhagat (attending hospitalist) she will admit patient

## 2025-07-08 NOTE — ED PROVIDER NOTE - INTERPRETATION
Gen: NAD, AOx3, non-toxic //            Head: NCAT //            HEENT: EOMI, oral mucosa moist, normal conjunctiva //            Lung: CTAB, no respiratory distress, no wheezes/rhonchi/rales B/L, speaking in full sentences. //            CV: RRR, no murmurs appreciated//            Abd: soft, NTND, no guarding, no pulsatile mass appreciated, no CVA tenderness //            MSK: R sided lower lumbar tender to palpation, unable to perform ROM testing on LE 2/2 to pain. Tender to palpation along entire leg, LLE chronic appearing wound w/ sero sanguinous discharge on dressing. no visible deformities elsewhere //            Neuro: No focal sensory or motor deficits //            Skin: Warm, well perfused//            Psych: normal affect. normal sinus rhythm

## 2025-07-08 NOTE — H&P ADULT - NSHPLABSRESULTS_GEN_ALL_CORE
CBC with Auto Diff (Reflex to Man Diff) (07.08.25 @ 11:33)    WBC Count: 9.71 K/uL    RBC Count: 5.47 M/uL    Hemoglobin: 14.9 g/dL    Hematocrit: 47.4 %    Mean Cell Volume: 86.7 fl    Mean Cell Hemoglobin: 27.2 pg    Mean Cell Hemoglobin Conc: 31.4 g/dL    Red Cell Distrib Width: 13.2 %    Platelet Count - Automated: 323 K/uL    MPV: 8.9 fL    Auto NRBC: 0 /100 WBCs    Auto Nucleated RBC #: 0.00 K/uL    Auto Neutrophil %: 68.4: Differential percentages must be correlated with absolute numbers for  clinical significance. %    Auto Lymphocyte %: 20.1 %    Auto Monocyte %: 10.5 %    Auto Eosinophil %: 0.6 %    Auto Basophil %: 0.2 %    Auto Immature Granulocyte %: 0.2: (Includes meta, myelo and promyelocytes). Mild elevations in immature  granulocytes may be seen with many inflammatory processes and pregnancy;  clinical correlation suggested. %    Auto Neutrophil #: 6.64 K/uL    Auto Lymphocyte #: 1.95 K/uL    Auto Monocyte #: 1.02 K/uL    Auto Eosinophil #: 0.06 K/uL    Auto Basophil #: 0.02 K/uL    Auto Immature Granulocyte #: 0.02 K/uL    Comprehensive Metabolic Panel (07.08.25 @ 11:33)    Sodium: 141 mmol/L    Potassium: 4.1 mmol/L    Chloride: 101 mmol/L    Carbon Dioxide: 33 mmol/L    Anion Gap: 7 mmol/L    Blood Urea Nitrogen: 21 mg/dL    Creatinine: 1.06 mg/dL    Glucose: 116 mg/dL    Calcium: 9.6 mg/dL    Protein Total: 7.8 g/dL    Albumin: 4.0 g/dL    Bilirubin Total: 0.7 mg/dL    Alkaline Phosphatase: 98 U/L    Aspartate Aminotransferase (AST/SGOT): 54 U/L    Alanine Aminotransferase (ALT/SGPT): 62 U/L    eGFR: 83: The estimated glomerular filtration rate (eGFR) calculation is based on  the 2021 CKD-EPI creatinine equation, which is validated in male and  female population 18 years of age and older (N Engl J Med 2021;  [id number]). mL/min/1.73m2

## 2025-07-09 ENCOUNTER — TRANSCRIPTION ENCOUNTER (OUTPATIENT)
Age: 55
End: 2025-07-09

## 2025-07-09 LAB
ANION GAP SERPL CALC-SCNC: 5 MMOL/L — SIGNIFICANT CHANGE UP (ref 5–17)
APTT BLD: 76.6 SEC — HIGH (ref 26.1–36.8)
APTT BLD: 83 SEC — HIGH (ref 26.1–36.8)
APTT BLD: 83.3 SEC — HIGH (ref 26.1–36.8)
BUN SERPL-MCNC: 18 MG/DL — SIGNIFICANT CHANGE UP (ref 7–23)
CALCIUM SERPL-MCNC: 8.9 MG/DL — SIGNIFICANT CHANGE UP (ref 8.4–10.5)
CHLORIDE SERPL-SCNC: 99 MMOL/L — SIGNIFICANT CHANGE UP (ref 96–108)
CO2 SERPL-SCNC: 31 MMOL/L — SIGNIFICANT CHANGE UP (ref 22–31)
CREAT SERPL-MCNC: 1.23 MG/DL — SIGNIFICANT CHANGE UP (ref 0.5–1.3)
EGFR: 69 ML/MIN/1.73M2 — SIGNIFICANT CHANGE UP
EGFR: 69 ML/MIN/1.73M2 — SIGNIFICANT CHANGE UP
GLUCOSE SERPL-MCNC: 110 MG/DL — HIGH (ref 70–99)
HCT VFR BLD CALC: 42.5 % — SIGNIFICANT CHANGE UP (ref 39–50)
HGB BLD-MCNC: 13.7 G/DL — SIGNIFICANT CHANGE UP (ref 13–17)
MCHC RBC-ENTMCNC: 27.8 PG — SIGNIFICANT CHANGE UP (ref 27–34)
MCHC RBC-ENTMCNC: 32.2 G/DL — SIGNIFICANT CHANGE UP (ref 32–36)
MCV RBC AUTO: 86.2 FL — SIGNIFICANT CHANGE UP (ref 80–100)
NRBC # BLD AUTO: 0 K/UL — SIGNIFICANT CHANGE UP (ref 0–0)
NRBC # FLD: 0 K/UL — SIGNIFICANT CHANGE UP (ref 0–0)
NRBC BLD AUTO-RTO: 0 /100 WBCS — SIGNIFICANT CHANGE UP (ref 0–0)
PLATELET # BLD AUTO: 259 K/UL — SIGNIFICANT CHANGE UP (ref 150–400)
PMV BLD: 8.6 FL — SIGNIFICANT CHANGE UP (ref 7–13)
POTASSIUM SERPL-MCNC: 4.4 MMOL/L — SIGNIFICANT CHANGE UP (ref 3.5–5.3)
POTASSIUM SERPL-SCNC: 4.4 MMOL/L — SIGNIFICANT CHANGE UP (ref 3.5–5.3)
RBC # BLD: 4.93 M/UL — SIGNIFICANT CHANGE UP (ref 4.2–5.8)
RBC # FLD: 13.2 % — SIGNIFICANT CHANGE UP (ref 10.3–14.5)
SODIUM SERPL-SCNC: 135 MMOL/L — SIGNIFICANT CHANGE UP (ref 135–145)
WBC # BLD: 9.64 K/UL — SIGNIFICANT CHANGE UP (ref 3.8–10.5)
WBC # FLD AUTO: 9.64 K/UL — SIGNIFICANT CHANGE UP (ref 3.8–10.5)

## 2025-07-09 PROCEDURE — 87040 BLOOD CULTURE FOR BACTERIA: CPT

## 2025-07-09 PROCEDURE — 85027 COMPLETE CBC AUTOMATED: CPT

## 2025-07-09 PROCEDURE — 36415 COLL VENOUS BLD VENIPUNCTURE: CPT

## 2025-07-09 PROCEDURE — 93005 ELECTROCARDIOGRAM TRACING: CPT

## 2025-07-09 PROCEDURE — 85610 PROTHROMBIN TIME: CPT

## 2025-07-09 PROCEDURE — 93970 EXTREMITY STUDY: CPT

## 2025-07-09 PROCEDURE — 83690 ASSAY OF LIPASE: CPT

## 2025-07-09 PROCEDURE — 80048 BASIC METABOLIC PNL TOTAL CA: CPT

## 2025-07-09 PROCEDURE — 85025 COMPLETE CBC W/AUTO DIFF WBC: CPT

## 2025-07-09 PROCEDURE — 74177 CT ABD & PELVIS W/CONTRAST: CPT

## 2025-07-09 PROCEDURE — 81003 URINALYSIS AUTO W/O SCOPE: CPT

## 2025-07-09 PROCEDURE — 85730 THROMBOPLASTIN TIME PARTIAL: CPT

## 2025-07-09 PROCEDURE — 80053 COMPREHEN METABOLIC PANEL: CPT

## 2025-07-09 PROCEDURE — 99233 SBSQ HOSP IP/OBS HIGH 50: CPT

## 2025-07-09 RX ORDER — POLYETHYLENE GLYCOL 3350 17 G/17G
17 POWDER, FOR SOLUTION ORAL
Refills: 0 | Status: DISCONTINUED | OUTPATIENT
Start: 2025-07-09 | End: 2025-07-10

## 2025-07-09 RX ORDER — PREDNISONE 20 MG/1
30 TABLET ORAL DAILY
Refills: 0 | Status: DISCONTINUED | OUTPATIENT
Start: 2025-07-09 | End: 2025-07-10

## 2025-07-09 RX ORDER — CEFTRIAXONE 500 MG/1
2000 INJECTION, POWDER, FOR SOLUTION INTRAMUSCULAR; INTRAVENOUS EVERY 24 HOURS
Refills: 0 | Status: DISCONTINUED | OUTPATIENT
Start: 2025-07-09 | End: 2025-07-10

## 2025-07-09 RX ADMIN — Medication 1 DROP(S): at 12:51

## 2025-07-09 RX ADMIN — HEPARIN SODIUM 1400 UNIT(S)/HR: 1000 INJECTION INTRAVENOUS; SUBCUTANEOUS at 17:02

## 2025-07-09 RX ADMIN — POLYETHYLENE GLYCOL 3350 17 GRAM(S): 17 POWDER, FOR SOLUTION ORAL at 21:45

## 2025-07-09 RX ADMIN — CEFTRIAXONE 100 MILLIGRAM(S): 500 INJECTION, POWDER, FOR SOLUTION INTRAMUSCULAR; INTRAVENOUS at 18:13

## 2025-07-09 RX ADMIN — OFLOXACIN 1 DROP(S): 3 SOLUTION OPHTHALMIC at 12:52

## 2025-07-09 RX ADMIN — Medication 25 GRAM(S): at 06:19

## 2025-07-09 RX ADMIN — ATORVASTATIN CALCIUM 80 MILLIGRAM(S): 80 TABLET, FILM COATED ORAL at 21:36

## 2025-07-09 RX ADMIN — Medication 1 DROP(S): at 06:20

## 2025-07-09 RX ADMIN — OFLOXACIN 1 DROP(S): 3 SOLUTION OPHTHALMIC at 18:00

## 2025-07-09 RX ADMIN — Medication 1 DROP(S): at 18:01

## 2025-07-09 RX ADMIN — HEPARIN SODIUM 1400 UNIT(S)/HR: 1000 INJECTION INTRAVENOUS; SUBCUTANEOUS at 07:30

## 2025-07-09 RX ADMIN — MYCOPHENOLATE MOFETIL 1000 MILLIGRAM(S): 500 TABLET, FILM COATED ORAL at 21:35

## 2025-07-09 RX ADMIN — PREDNISONE 30 MILLIGRAM(S): 20 TABLET ORAL at 12:52

## 2025-07-09 RX ADMIN — AMLODIPINE BESYLATE 5 MILLIGRAM(S): 10 TABLET ORAL at 06:19

## 2025-07-09 RX ADMIN — HEPARIN SODIUM 1400 UNIT(S)/HR: 1000 INJECTION INTRAVENOUS; SUBCUTANEOUS at 10:55

## 2025-07-09 RX ADMIN — MYCOPHENOLATE MOFETIL 1500 MILLIGRAM(S): 500 TABLET, FILM COATED ORAL at 12:53

## 2025-07-09 RX ADMIN — POLYETHYLENE GLYCOL 3350 17 GRAM(S): 17 POWDER, FOR SOLUTION ORAL at 12:50

## 2025-07-09 RX ADMIN — OFLOXACIN 1 DROP(S): 3 SOLUTION OPHTHALMIC at 06:20

## 2025-07-09 RX ADMIN — Medication 300 MILLIGRAM(S): at 11:06

## 2025-07-09 RX ADMIN — HEPARIN SODIUM 1400 UNIT(S)/HR: 1000 INJECTION INTRAVENOUS; SUBCUTANEOUS at 04:02

## 2025-07-09 NOTE — CARE COORDINATION ASSESSMENT. - NSPASTMEDSURGHISTORY_GEN_ALL_CORE_FT
PAST MEDICAL & SURGICAL HISTORY:  Atrial fibrillation and flutter      Rheumatoid arthritis      H/O cardiac radiofrequency ablation      Childhood asthma      HADLEY (obstructive sleep apnea)      Benign essential hypertension      HTN (hypertension)      Scleritis      Paroxysmal atrial fibrillation      H/O cardiac radiofrequency ablation

## 2025-07-09 NOTE — CONSULT NOTE ADULT - ASSESSMENT
56 yo man adm w acute onset progressive left flank pain, started while in recovery room post left cataract surgery Monday, came to ER Tuesday, CT a/p Tues 7/8 sig for wedge infarct spleen and subcapsular left kidney, also mild splenomegaly 13.6cm  Pt w Rheum Arthritis on Celcept x ~2yrs(had been on MTX) and ~4yrs low dose prednisone for eye scleritis (recent incr dose for cataract surgery), Afib/A flutter post multi ablations 2016--2023 and on Eliquis since last ablation, known atrial septal defect for planned repair after the cataract surgery, hx CVA.  Pt has been off Eliquis since Saturday due to Cataract surgery plan and restarted Monday(after already left flank pain)  Had right cataract surgery one month ago no complications  No family hx thromboembolism  Not on exogenous androgen or other supplements  Normal CBC  Duplex legs no DVT  CXR megative 4/2025  JOHN-intact Tricuspid/aortic/mitral valves, Pulm valve not well seen. Atrial setum aneurysm c/w hx PFO  Started on IV Heparin, left flank pain improved    -appear provoked left renal and splenic infarct as with known ASD, cardiac arrythmia, CVA hx and off Eliquis x 48+hours  -not appear Eliquis failure as was off   -however, will check Lupus Anticoagulant and antiphospholipids, as w Rheum Arthritis hx, and if positive the better anticoagulant would be Coumadin  -continue IV Heparin for now  -speedy ASD repair may be best therapy    discussed w pt and wife  thank you, will follow w you
The patient is a 55 year old male with a history of HTN, atrial fibrillation s/p multiple ablations, atrial flutter s/p ablation, CVA, ostium secundum ASD who presents with abdominal pain, found to have splenic and renal infarcts.    Plan:  - ECG with sinus rhythm and no evidence of ischemia/infarction  - No atrial fibrillation noted in ED; the patient also reportedly had a recent cardiac monitor showing no atrial fibrillation as well  - Check echo  - The patient had a JOHN 11/24 showing no SHANKAR clot. There was an ostium secundum ASD and concomitant PFO.  - Unclear if the ASD is congential or iatrogenic from prior ablation  - Regardless, there is a high possibility that the patient's recent thromboembolic issues in multiple vascular territories (brain, spleen, kidney) are related to this  - Check LE venous duplex  - Started on heparin drip  - Consider vascular surgery eval  - Transition back to apixaban 5 mg bid if no further inpatient procedures planned  - He will need to follow-up back up with his structural heart doctor for ASD closure

## 2025-07-09 NOTE — CONSULT NOTE ADULT - SUBJECTIVE AND OBJECTIVE BOX
HPI:  54YO M PMH HTN, and Afib s/p multiple ablations, atrial flutter s/p ablation, CVA, ostium secundum ASD, hx of RA and scleritis on cellcept complaining of left lower quadrant abdominal pain since yesterday.  Patient denies fevers chills nausea vomiting diarrhea dysuria hematuria frequency.  Patient relates he had cataract surgery on his left eye yesterday symptoms began after he was home from the surgery.  CT done in ER showsMild splenomegaly. Wedge-shaped subcapsular hypodensity in the spleen, suggestive of  infarction. Wedge-shaped subcapsular hypodensities in the left kidney, suggestive of infarction. Pt was started on IV Heparin, hematology and Cardiology consulted. Spiked fever to 102.2 with chills last night. Denies n/v/d CP SOB.    Infectious Disease consult was called to evaluate pt and for antibiotic management.      Past Medical & Surgical Hx:  PAST MEDICAL & SURGICAL HISTORY:  Rheumatoid arthritis  Atrial fibrillation and flutter  Paroxysmal atrial fibrillation  Scleritis  HTN (hypertension)  Benign essential hypertension  HADLEY (obstructive sleep apnea)  Childhood asthma  H/O cardiac radiofrequency ablation  H/O cardiac radiofrequency ablation      Social History--  EtOH: denies   Tobacco: denies   Drug Use: denies    FAMILY HISTORY:  FH: heart disease (Father)    FH: stroke (Mother)      Allergies  losartan (Other)    Intolerances  NONE      Home Medications:  amLODIPine 5 mg oral tablet: 1 tab(s) orally once a day (08 Jul 2025 14:21)  atorvastatin 80 mg oral tablet: 1 tab(s) orally once a day (at bedtime) (08 Jul 2025 14:21)  metoprolol succinate 25 mg oral tablet, extended release: 1 tab(s) orally once a day (08 Jul 2025 14:21)  mycophenolate mofetil 500 mg oral tablet: 3 tab(s) orally once a day (08 Jul 2025 14:21)  mycophenolate mofetil 500 mg oral tablet: 2 tab(s) orally once a day (at bedtime) (08 Jul 2025 14:21)      Current Inpatient Medications :    ANTIBIOTICS:   piperacillin/tazobactam IVPB.. 3.375 Gram(s) IV Intermittent every 8 hours  vancomycin  IVPB      vancomycin  IVPB 1500 milliGRAM(s) IV Intermittent every 12 hours      OTHER RELEVANT MEDICATIONS :  acetaminophen     Tablet .. 650 milliGRAM(s) Oral every 6 hours PRN  aluminum hydroxide/magnesium hydroxide/simethicone Suspension 30 milliLiter(s) Oral every 4 hours PRN  amLODIPine   Tablet 5 milliGRAM(s) Oral daily  atorvastatin 80 milliGRAM(s) Oral at bedtime  heparin   Injectable 7500 Unit(s) IV Push every 6 hours PRN  heparin   Injectable 3500 Unit(s) IV Push every 6 hours PRN  heparin  Infusion.  Unit(s)/Hr IV Continuous <Continuous>  melatonin 3 milliGRAM(s) Oral at bedtime PRN  morphine  - Injectable 2 milliGRAM(s) IV Push every 6 hours PRN  mycophenolate mofetil 1500 milliGRAM(s) Oral daily  mycophenolate mofetil 1000 milliGRAM(s) Oral at bedtime  ofloxacin 0.3% Solution 1 Drop(s) Left EYE four times a day  ondansetron Injectable 4 milliGRAM(s) IV Push every 8 hours PRN  polyethylene glycol 3350 17 Gram(s) Oral two times a day PRN  prednisoLONE acetate 1% Suspension 1 Drop(s) Left EYE four times a day  predniSONE   Tablet 30 milliGRAM(s) Oral daily      ROS:  CONSTITUTIONAL: +fever or chills  EYES:  Negative  blurry vision or double vision  CARDIOVASCULAR:  Negative for chest pain or palpitations  RESPIRATORY:  Negative for cough, wheezing, or SOB   GASTROINTESTINAL:  Negative for nausea, vomiting, diarrhea, constipation, +abdominal pain  GENITOURINARY:  Negative frequency, urgency , dysuria or hematuria   NEUROLOGIC:  No headache, confusion, dizziness, lightheadedness  All other systems were reviewed and are negative      I&O's Detail    08 Jul 2025 07:01  -  09 Jul 2025 07:00  --------------------------------------------------------  IN:    Heparin Infusion: 56 mL    IV PiggyBack: 100 mL    Oral Fluid: 120 mL  Total IN: 276 mL    OUT:    Voided (mL): 750 mL  Total OUT: 750 mL    Total NET: -474 mL      Physical Exam:  Vital Signs Last 24 Hrs  T(C): 37.3 (09 Jul 2025 08:33), Max: 39 (08 Jul 2025 22:00)  T(F): 99.2 (09 Jul 2025 08:33), Max: 102.2 (08 Jul 2025 22:00)  HR: 73 (09 Jul 2025 13:05) (68 - 86)  BP: 128/77 (09 Jul 2025 13:05) (111/92 - 139/82)  BP(mean): 93 (09 Jul 2025 13:05) (80 - 100)  RR: 20 (09 Jul 2025 13:05) (15 - 20)  SpO2: 96% (09 Jul 2025 13:05) (94% - 97%)    Parameters below as of 09 Jul 2025 08:00  Patient On (Oxygen Delivery Method): room air      General: no acute distress Left eye guard in place  Neck: supple, trachea midline  Lungs: clear, no wheeze/rhonchi  Cardiovascular: regular rate and rhythm, S1 S2  Abdomen: soft, nontender, ND, bowel sounds normal  Neurological:  alert and oriented x3  Skin: no rash  Extremities: no edema    Labs:                         13.7   9.64  )-----------( 259      ( 09 Jul 2025 05:30 )             42.5   07-09    135  |  99  |  18  ----------------------------<  110[H]  4.4   |  31  |  1.23    Ca    8.9      09 Jul 2025 05:30    TPro  7.8  /  Alb  4.0  /  TBili  0.7  /  DBili  x   /  AST  54[H]  /  ALT  62[H]  /  AlkPhos  98  07-08      RECENT CULTURES:      RADIOLOGY & ADDITIONAL STUDIES:    ACC: [id number] EXAM:  CT ABDOMEN AND PELVIS IC   ORDERED BY: JOHNNY VINCENT     PROCEDURE DATE:  07/08/2025          INTERPRETATION:  CLINICAL INFORMATION: Left lower quadrant abdominal pain    COMPARISON: None.    CONTRAST/COMPLICATIONS:  IV Contrast: Omnipaque 350  90 cc administered   10 cc discarded  Oral Contrast: NONE.    PROCEDURE:  CT of the Abdomen and Pelvis was performed.  Sagittal and coronal reformats were performed.    FINDINGS:  LOWER CHEST: Small bilateral atelectasis    LIVER: Within normal limits.  BILE DUCTS: Normal caliber.  GALLBLADDER: Within normal limits.  SPLEEN: Wedge-shaped subcapsular hypodensity in the spleen, suggestive of   infarction. Mild splenomegaly measuring 13.6 cm.  PANCREAS: Within normal limits.  ADRENALS: Within normal limits.  KIDNEYS/URETERS: A few small hypodense lesions in the kidneys   bilaterally, too small to characterize. Wedge-shaped subcapsular   hypodensities in the left kidney, suggestive of infarction.    BLADDER: Underdistended.  REPRODUCTIVE ORGANS: The prostate and seminal vesicles appear grossly   unremarkable.    BOWEL: No bowel obstruction or grossly thickened bowel wall. Colonic   diverticulosis without evidence for diverticulitis. Appendix within   normal limits. Moderate amount of stool in the ascending and transverse   colon.  PERITONEUM/RETROPERITONEUM: Within normal limits.  VESSELS: Within normal limits.  LYMPH NODES: No lymphadenopathy.  ABDOMINAL WALL: Fat-containing inguinal hernias bilaterally.  BONES: Within normal limits.    IMPRESSION:  Mild splenomegaly.    Wedge-shaped subcapsular hypodensity in the spleen, suggestive of   infarction. Wedge-shaped subcapsular hypodensities in the left kidney,   suggestive of infarction.    Assessment :   54YO M PMH HTN, and Afib s/p multiple ablations, atrial flutter s/p ablation, CVA, ostium secundum ASD, hx of RA and scleritis on cellcept complaining of left lower quadrant abdominal pain since yesterday.  Patient denies fevers chills nausea vomiting diarrhea dysuria hematuria frequency.  Patient relates he had cataract surgery on his left eye yesterday symptoms began after he was home from the surgery.  CT done in ER showsMild splenomegaly. Wedge-shaped subcapsular hypodensity in the spleen, suggestive of  infarction. Wedge-shaped subcapsular hypodensities in the left kidney, suggestive of infarction. Pt was started on IV Heparin, hematology and Cardiology consulted. Spiked fever to 102.2 with chills last night.   Fevers likely sec splenic/renal infarction  Rule out endocarditis     Plan :   Change to Rocephin 2gms IV daily   Fu cultures  Trend temps and cbc  Fu echo  Heme eval    Advance Directives- Full code  Current Medications are documented.   Drug-drug interactions reviewed.    Continue with present regiment .  Approptiate use of antibiotics and adverse effects reviewed.      I have discussed the above plan of care with patient/wife in detail. They expressed understanding of the treatment plan . Risks, benefits and alternatives discussed in detail. I have asked if they have any questions or concerns and appropriately addressed them to the best of my ability .      > 45 minutes spent in direct patient care reviewing  the notes, lab data/ imaging , discussion with multidisciplinary team. All questions were addressed and answered to the best of my capacity .    Thank you for allowing me to participate in the care of your patient .      Roselyn Hawley MD  Infectious Disease  [phone]    Individualized infection control protocols for an individual patient based on their diagnosis and risks in order to reduce risk of disease transmission followed. Coordinating with  infection prevention and control team members to enable healthcare facility staff to safely care for patient.  Managing infection prevention and treatment protocols associated with transitions of care for complex patients.  In-depth patient chart review that entails going back farther in time and assessing the complete breadth of all health care interactions, with higher-level synthesis for complex diagnoses.  Engaging in complex medical decision-making associated with antimicrobial prescribing including considerations such as antimicrobial resistance patterns, emergence of new variants/strains, recent antibiotic exposure, interactions/complications from comorbidities including concurrent infections, public health considerations to minimize development of antimicrobial resistance, and emerging and re-emerging infections.         
All records reviewed.    seen w wife present    HPI:  56 yo man adm w acute onset progressive left flank pain, started while in recovery room post left cataract surgery Monday, came to ER Tuesday, CT a/p Tues 7/8 sig for wedge infarct spleen and subcapsular left kidney, also mild splenomegaly 13.6cm    Pt w Rheum Arthritis on Celcept x ~2yrs(had been on MTX) and ~4yrs low dose prednisone for eye scleritis (recent incr dose for cataract surgery), Afib/A flutter post multi ablations 2016--2023 and on Eliquis since last ablation, known atrial septal defect for planned repair after the cataract surgery, hx CVA.  Pt has been off Eliquis since Saturday due to Cataract surgery plan and restarted Monday(after already left flank pain)  Had right cataract surgery one month ago no complications  No family hx thromboembolism  Not on exogenous androgen or other supplements    Normal CBC  Duplex legs no DVT  CXR megative 4/2025  JOHN-intact Tricuspid/aortic/mitral valves, Pulm valve not well seen. Atrial setum aneurysm c/w hx PFO    Started on IV Heparin, left flank pain improved          PAST MEDICAL & SURGICAL HISTORY:  Rheumatoid arthritis      Atrial fibrillation and flutter      Paroxysmal atrial fibrillation      Scleritis      HTN (hypertension)      Benign essential hypertension      HADLEY (obstructive sleep apnea)      Childhood asthma      H/O cardiac radiofrequency ablation      H/O cardiac radiofrequency ablation          Review of System:  no fever chills, SOB    MEDICATIONS  (STANDING):  amLODIPine   Tablet 5 milliGRAM(s) Oral daily  atorvastatin 80 milliGRAM(s) Oral at bedtime  cefTRIAXone   IVPB 2000 milliGRAM(s) IV Intermittent every 24 hours  heparin  Infusion.  Unit(s)/Hr (17 mL/Hr) IV Continuous <Continuous>  mycophenolate mofetil 1500 milliGRAM(s) Oral daily  mycophenolate mofetil 1000 milliGRAM(s) Oral at bedtime  ofloxacin 0.3% Solution 1 Drop(s) Left EYE four times a day  prednisoLONE acetate 1% Suspension 1 Drop(s) Left EYE four times a day  predniSONE   Tablet 30 milliGRAM(s) Oral daily    MEDICATIONS  (PRN):  acetaminophen     Tablet .. 650 milliGRAM(s) Oral every 6 hours PRN Temp greater or equal to 38C (100.4F), Mild Pain (1 - 3)  aluminum hydroxide/magnesium hydroxide/simethicone Suspension 30 milliLiter(s) Oral every 4 hours PRN Dyspepsia  heparin   Injectable 7500 Unit(s) IV Push every 6 hours PRN For aPTT less than 40  heparin   Injectable 3500 Unit(s) IV Push every 6 hours PRN For aPTT between 40 - 57  melatonin 3 milliGRAM(s) Oral at bedtime PRN Insomnia  morphine  - Injectable 2 milliGRAM(s) IV Push every 6 hours PRN Moderate Pain (4 - 6)  ondansetron Injectable 4 milliGRAM(s) IV Push every 8 hours PRN Nausea and/or Vomiting  polyethylene glycol 3350 17 Gram(s) Oral two times a day PRN Constipation      Allergies    losartan (Other)    Intolerances        SOCIAL HISTORY:  denies Etoh or tobacco    FAMILY HISTORY:  FH: heart disease (Father)    FH: stroke (Mother)        Vital Signs Last 24 Hrs  T(C): 37.6 (09 Jul 2025 14:48), Max: 39 (08 Jul 2025 22:00)  T(F): 99.7 (09 Jul 2025 14:48), Max: 102.2 (08 Jul 2025 22:00)  HR: 73 (09 Jul 2025 13:05) (68 - 86)  BP: 128/77 (09 Jul 2025 13:05) (111/92 - 128/77)  BP(mean): 93 (09 Jul 2025 13:05) (80 - 100)  RR: 20 (09 Jul 2025 13:05) (15 - 20)  SpO2: 96% (09 Jul 2025 13:05) (94% - 97%)    Parameters below as of 09 Jul 2025 08:00  Patient On (Oxygen Delivery Method): room air        PHYSICAL EXAM:      General: in no acute distress  Eyes:sclera anicteric, pupils equal and EOMI. Left eye w protective cover and bandage  ENMT:buccal mucosa moist, pharynx not injected  Neck:supple, trachea midline  Lungs:clear, no wheeze/rhonchi  Cardiovascular:regular rate and rhythm, S1 S2  Abdomen:soft, nontender, no organomegaly present, bowel sounds normal  Neurological:alert and oriented x3, Cranial Nerves II-XII grossly intact  Skin:no increased ecchymosis/petechiae/purpura  Lymph Nodes:no palpable cervical/supraclavicular lymph nodes enlargements  Extremities:no cyanosis/clubbing/edema        LABS:                        13.7   9.64  )-----------( 259      ( 09 Jul 2025 05:30 )             42.5     07-09 @ 05:30  WBC9.64  RBC4.93 Hgb13.7 Hct42.5  MCV86.2  [id number]  Auto Neutro-- Band-- Auto Lymph-- Atypical Lymph-- Reactive Lymph-- Auto Mono-- Auto Eos-- Auto Baso--        07-08 @ 21:02  WBC8.94  RBC5.05 Hgb13.9 Hct43.2  MCV85.5  Pyst160  Auto Neutro-- Band-- Auto Lymph-- Atypical Lymph-- Reactive Lymph-- Auto Mono-- Auto Eos-- Auto Baso--        07-08 @ 11:33  WBC9.71  RBC5.47 Hgb14.9 Hct47.4  MCV86.7  Lgmd773  Auto Iihaty10.4 Band-- Auto Lymph20.1 Atypical Lymph-- Reactive Lymph-- Auto Mono10.5 Auto Eos0.6 Auto Baso0.2          07-09    135  |  99  |  18  ----------------------------<  110[H]  4.4   |  31  |  1.23    Ca    8.9      09 Jul 2025 05:30    TPro  7.8  /  Alb  4.0  /  TBili  0.7  /  DBili  x   /  AST  54[H]  /  ALT  62[H]  /  AlkPhos  98  07-08 07-09 @ 09:49  PT-- INR--  PTT83.0  07-08 @ 21:02  PT-- INR--  NCZ375.7  07-08 @ 11:33  PT12.3 INR1.06  PTT28.1    Urinalysis Basic - ( 09 Jul 2025 05:30 )    Color: x / Appearance: x / SG: x / pH: x  Gluc: 110 mg/dL / Ketone: x  / Bili: x / Urobili: x   Blood: x / Protein: x / Nitrite: x   Leuk Esterase: x / RBC: x / WBC x   Sq Epi: x / Non Sq Epi: x / Bacteria: x        PERIPHERAL BLOOD SMEAR REVIEW:      RADIOLOGY & ADDITIONAL STUDIES:    < from: CT Abdomen and Pelvis w/ IV Cont (07.08.25 @ 11:58) >    ACC: [id number] EXAM:  CT ABDOMEN AND PELVIS IC   ORDERED BY: JOHNNY VINCENT     PROCEDURE DATE:  07/08/2025          INTERPRETATION:  CLINICAL INFORMATION: Left lower quadrant abdominal pain    COMPARISON: None.    CONTRAST/COMPLICATIONS:  IV Contrast: Omnipaque 350  90 cc administered   10 cc discarded  Oral Contrast: NONE.    PROCEDURE:  CT of the Abdomen and Pelvis was performed.  Sagittal and coronal reformats were performed.    FINDINGS:  LOWER CHEST: Small bilateral atelectasis    LIVER: Within normal limits.  BILE DUCTS: Normal caliber.  GALLBLADDER: Within normal limits.  SPLEEN: Wedge-shaped subcapsular hypodensity in the spleen, suggestive of   infarction. Mild splenomegaly measuring 13.6 cm.  PANCREAS: Within normal limits.  ADRENALS: Within normal limits.  KIDNEYS/URETERS: A few small hypodense lesions in the kidneys   bilaterally, too small to characterize. Wedge-shaped subcapsular   hypodensities in the left kidney, suggestive of infarction.    BLADDER: Underdistended.  REPRODUCTIVE ORGANS: The prostate and seminal vesicles appear grossly   unremarkable.    BOWEL: No bowel obstruction or grossly thickened bowel wall. Colonic   diverticulosis without evidence for diverticulitis. Appendix within   normal limits. Moderate amount of stool in the ascending and transverse   colon.  PERITONEUM/RETROPERITONEUM: Within normal limits.  VESSELS: Within normal limits.  LYMPH NODES: No lymphadenopathy.  ABDOMINAL WALL: Fat-containing inguinal hernias bilaterally.  BONES: Within normal limits.    IMPRESSION:  Mild splenomegaly.    Wedge-shaped subcapsular hypodensity in the spleen, suggestive of   infarction. Wedge-shaped subcapsular hypodensities in the left kidney,   suggestive of infarction.    Dr. JOHNNY VINCENT was informed with read back.    --- End of Report ---            JAQUI CANSECO MD; Attending Radiologist  This document has been electronically signed. Jul 8 2025  1:11PM    < end of copied text >  < from: US Duplex Venous Lower Ext Complete, Bilateral (07.08.25 @ 14:56) >  ACC: [id number] EXAM:  US DPLX LWR EXT VEINS COMPL BI   ORDERED BY: NY BARTLETT     PROCEDURE DATE:  07/08/2025          INTERPRETATION:  CLINICAL INFORMATION: Edema    COMPARISON: Duplex sonogram lower extremity 11/8/2024    TECHNIQUE: Duplex sonography of the BILATERAL LOWER extremity veins with   color and spectral Doppler, with and without compression.    FINDINGS:    RIGHT:  Normal compressibility of the RIGHT common femoral, femoral and popliteal   veins.  Doppler examination shows normalspontaneous and phasic flow.  No RIGHT calf vein thrombosis is detected.    LEFT:  Normal compressibility of the LEFT common femoral, femoral and popliteal   veins.  Doppler examination shows normal spontaneous and phasic flow.  No LEFT calf vein thrombosis is detected.    IMPRESSION:  No evidence of deep venous thrombosis in either lower extremity.            --- End of Report ---    < end of copied text >  
History of Present Illness: The patient is a 55 year old male with a history of HTN, atrial fibrillation s/p multiple ablations, atrial flutter s/p ablation, CVA, ostium secundum ASD who presents with abdominal pain. He underwent cataract surgery yesterday and shortly after developed left-sided abdominal discomfort, worse with inspiration. He stopped taking his apixaban 2 days prior to the surgery. He had a CVA 11/24 and was diagnosed with an ASD at that time. He notes that there is no current plan for closure of the ASD.    Past Medical/Surgical History:  HTN, atrial fibrillation s/p multiple ablations, atrial flutter s/p ablation, CVA, ostium secundum ASD    Medications:    Family History: Non-contributory family history of premature cardiovascular atherosclerotic disease    Social History: No tobacco, alcohol or drug use    Review of Systems:  General: No fevers, chills, weight gain  Skin: No rashes, color changes  Cardiovascular: No chest pain, orthopnea  Respiratory: No shortness of breath, cough  Gastrointestinal: No nausea, abdominal pain  Genitourinary: No incontinence, pain with urination  Musculoskeletal: No pain, swelling, decreased range of motion  Neurological: No headache, weakness  Psychiatric: No depression, anxiety  Endocrine: No weight gain, increased thirst  All other systems are comprehensively negative.    Physical Exam:  Vitals:        Vital Signs Last 24 Hrs  T(C): 37.2 (08 Jul 2025 10:29), Max: 37.2 (08 Jul 2025 10:29)  T(F): 99 (08 Jul 2025 10:29), Max: 99 (08 Jul 2025 10:29)  HR: 76 (08 Jul 2025 10:29) (76 - 76)  BP: 132/80 (08 Jul 2025 10:29) (132/80 - 132/80)  BP(mean): --  RR: 19 (08 Jul 2025 10:29) (19 - 19)  SpO2: 98% (08 Jul 2025 10:29) (98% - 98%)    Parameters below as of 08 Jul 2025 10:29  Patient On (Oxygen Delivery Method): room air      General: NAD  HEENT: MMM  Neck: No JVD, no carotid bruit  Lungs: CTAB  CV: RRR, nl S1/S2, no M/R/G  Abdomen: S/NT/ND, +BS  Extremities: No LE edema, no cyanosis  Neuro: AAOx3, non-focal  Skin: No rash    Labs:                        14.9   9.71  )-----------( 323      ( 08 Jul 2025 11:33 )             47.4     07-08    141  |  101  |  21  ----------------------------<  116[H]  4.1   |  33[H]  |  1.06    Ca    9.6      08 Jul 2025 11:33    TPro  7.8  /  Alb  4.0  /  TBili  0.7  /  DBili  x   /  AST  54[H]  /  ALT  62[H]  /  AlkPhos  98  07-08        PT/INR - ( 08 Jul 2025 11:33 )   PT: 12.3 sec;   INR: 1.06 ratio         PTT - ( 08 Jul 2025 11:33 )  PTT:28.1 sec    ECG/Telemetry: NSR, 1st deg AVB, normal axis, no ST abnormality

## 2025-07-09 NOTE — DIETITIAN INITIAL EVALUATION ADULT - REASON FOR ADMISSION
abdominal pain    per HPI: Patient is a 55y Male with a hx iof HTN, and Afib s/p multiple ablations, atrial flutter s/p ablation, CVA, ostium secundum ASD, hx of RA and scleritis complaining of left lower quadrant abdominal pain since yesterday.  Patient denies fevers chills nausea vomiting diarrhea dysuria hematuria frequency.  Patient relates he had cataract surgery on his left eye yesterday symptoms began after he was home from the surgery.  Pt's CT done in ER shows  Mild splenomegaly. Wedge-shaped subcapsular hypodensity in the spleen, suggestive of  infarction. Wedge-shaped subcapsular hypodensities in the left kidney, suggestive of infarction. Pt was started on IV Heparin, hematology and Cardiology consulted. Pt admitted to medicine service for further mgmt.   (08 Jul 2025 15:48)

## 2025-07-09 NOTE — CARE COORDINATION ASSESSMENT. - NSDCPLANSERVICES_GEN_ALL_CORE
Per H&P/ EMR   55y Male with a hx iof HTN, and Afib s/p multiple ablations, atrial flutter s/p ablation, CVA, ostium secundum ASD, hx of RA and scleritis complaining of left lower quadrant abdominal pain since yesterday.  Patient denies fevers chills nausea vomiting diarrhea dysuria hematuria frequency.  Patient relates he had cataract surgery on his left eye yesterday symptoms began after he was home from the surgery.  Pt's CT done in ER shows  Mild splenomegaly.  Wedge-shaped subcapsular hypodensity in the spleen, suggestive of  infarction. Wedge-shaped subcapsular hypodensities in the left kidney, suggestive of infarction.    Pt was started on IV Heparin, hematology and Cardiology consulted. Pt admitted to medicine service for further mgmt.    ---Per morning treatment team rounds:  Splenic and Renal Infarcts on a  Heparin drip  admit with  abd pain , tele monitor NSR- Hx ablation 10/2024, RA, voiding, T 102.2 yesterday cx work up done and is  on Zosyn and Vanco .LE doppler negative, scleritis on po steroids  Pt. is S/P Cataract surgery wearing an eye shield. seen by cardio.  Heme consult./Anticipated Needs Unclear at Present/No Anticipated Discharge Needs

## 2025-07-09 NOTE — PATIENT CHOICE NOTE. - NSPTCHOICENOTES_GEN_ALL_CORE
Need to be determined  pending pt. progress and workup.  No anticipated home care needs at this time.  CM follow.

## 2025-07-09 NOTE — DIETITIAN INITIAL EVALUATION ADULT - PERTINENT MEDS FT
MEDICATIONS  (STANDING):  amLODIPine   Tablet 5 milliGRAM(s) Oral daily  atorvastatin 80 milliGRAM(s) Oral at bedtime  heparin  Infusion.  Unit(s)/Hr (17 mL/Hr) IV Continuous <Continuous>  mycophenolate mofetil 1500 milliGRAM(s) Oral daily  mycophenolate mofetil 1000 milliGRAM(s) Oral at bedtime  ofloxacin 0.3% Solution 1 Drop(s) Left EYE four times a day  piperacillin/tazobactam IVPB.. 3.375 Gram(s) IV Intermittent every 8 hours  prednisoLONE acetate 1% Suspension 1 Drop(s) Left EYE four times a day  vancomycin  IVPB      vancomycin  IVPB 1500 milliGRAM(s) IV Intermittent every 12 hours    MEDICATIONS  (PRN):  acetaminophen     Tablet .. 650 milliGRAM(s) Oral every 6 hours PRN Temp greater or equal to 38C (100.4F), Mild Pain (1 - 3)  aluminum hydroxide/magnesium hydroxide/simethicone Suspension 30 milliLiter(s) Oral every 4 hours PRN Dyspepsia  heparin   Injectable 7500 Unit(s) IV Push every 6 hours PRN For aPTT less than 40  heparin   Injectable 3500 Unit(s) IV Push every 6 hours PRN For aPTT between 40 - 57  melatonin 3 milliGRAM(s) Oral at bedtime PRN Insomnia  morphine  - Injectable 2 milliGRAM(s) IV Push every 6 hours PRN Moderate Pain (4 - 6)  ondansetron Injectable 4 milliGRAM(s) IV Push every 8 hours PRN Nausea and/or Vomiting  polyethylene glycol 3350 17 Gram(s) Oral two times a day PRN Constipation

## 2025-07-09 NOTE — DIETITIAN INITIAL EVALUATION ADULT - ADD RECOMMEND
1) Continue DASH diet  2) Continue to monitor PO intake, tolerance to diet prescription, weights, labs, GI tolerance, and skin integrity. MD notified via teams regarding any change/changes to diet order.

## 2025-07-09 NOTE — DIETITIAN INITIAL EVALUATION ADULT - OTHER INFO
Visited patient in room, pt able/unable to answer questions. presents with  appetite/po intake, consuming >% of DASH meals. Denies n/v/d/c, last BM  / . No reported difficulty chewing or swallowing. NKFA. Current adm weight 98kg likely inaccurate given stability of HIE wts and reports no recent wt loss, weight appears to be stable, will continue to monitor weight trends as able.    Pertinent labs/meds reviewed: receiving zofran, miralax PRN; A1c 5.6%    Education provided/not provided at this time. RD to remain available per protocol.  Visited patient in room, pt unable to answer questions at this time due to clinical presentation. Discussed at [facility] team rounds and performed extensive chart review. Pt advanced to DASH diet yesterday, consuming 50% of meals. No GI events, WDL per flowsheet. No reported difficulty chewing or swallowing. NKFA. Current adm weight 98kg likely inaccurate given stability of HIE wts and reports no recent wt loss, weight appears to be stable, will continue to monitor weight trends as able.    Pertinent labs/meds reviewed: receiving zofran, miralax PRN; A1c 5.6%    Education not appropriate at this time.  Recommend continue DASH diet as tolerated. RD to remain available per protocol.

## 2025-07-09 NOTE — PROGRESS NOTE ADULT - ASSESSMENT
The patient is a 55 year old male with a history of HTN, atrial fibrillation s/p multiple ablations, atrial flutter s/p ablation, CVA, ostium secundum ASD who presents with abdominal pain, found to have splenic and renal infarcts.    Plan:  - ECG with sinus rhythm and no evidence of ischemia/infarction  - No atrial fibrillation noted in ED; the patient also reportedly had a recent cardiac monitor showing no atrial fibrillation as well  - Echo with normal LV systolic function, no significant valve issues, RV enlargement  - The patient had a JOHN 11/24 showing no SHANKAR clot. There was an ostium secundum ASD and concomitant PFO.  - Unclear if the ASD is congential or iatrogenic from prior ablation  - Regardless, there is a high possibility that the patient's recent thromboembolic issues in multiple vascular territories (brain, spleen, kidney) are related to this  - LE venous duplex negative for DVT  - On heparin drip  - Consider vascular surgery eval  - Transition back to apixaban 5 mg bid if no further inpatient procedures planned  - He will need to follow-up back up with his structural heart doctor for ASD closure given multiple thromboembolic events and right-sided enlargement

## 2025-07-09 NOTE — PROGRESS NOTE ADULT - ASSESSMENT
Patient is a 55y Male with a hx iof HTN, and Afib s/p multiple ablations, atrial flutter s/p ablation, CVA, ostium secundum ASD, hx of RA and scleritis complaining of left lower quadrant abdominal pain since yesterday.   Pt's CT done in ER shows Mild splenomegaly.  Wedge-shaped subcapsular hypodensity in the spleen, suggestive of  infarction. Wedge-shaped subcapsular hypodensities in the left kidney, suggestive of infarction.  Pt was started on IV Heparin, hematology and Cardiology consulted. Pt admitted to medicine service for further mgmt.      Fever 102 overnight  could be related to multiple infarcts  BC sent, will follow up  Cont IV pip-larissa and vancomycin, pending BC results  CT did not show any abscesses  Echo with no vegetations  ID consulted        Splenic and Renal Infarcts  Pt started on IV heparin, will cont pending vascular surgery and hematology eval  Hematology consulted  cont morphine 2gm prn for moderate pain  Cont tylenol for mild to moderate pain  Cardiology input noted, rec vascular SURGERY Eval, contacted Dr. Richardson.    Hx of Afib  currently in sinus rythm  Cardiology input appreciated as per Card" Unclear if the ASD is congential or iatrogenic from prior ablation  - Regardless, there is a high possibility that the patient's recent thromboembolic issues in multiple vascular territories (brain, spleen, kidney) are related to this. Rec to follow up with pt's structural Heart Cardiologist as outpatient to fix the ASD and PFO in view of multiple infarcts  Echo result noted  LE venous doppler negative for DVT      Hx of HTN  cont home meds    Hx of RA  cont  Mycophenolate 1500mg in the morning and 1000 at night time.    Hx of Scleritis  On prednisone 30mg x 1 week followed by 20mg x 1 week and tapering like this dose as outpatient rec by his ophth.    Hx of recent cataract left eye  Cont eye drops    Constipation  Started on Miralax      d/w pt and pt's wife, D/w Nursing staff and ICU PA   Patient is a 55y Male with a hx iof HTN, and Afib s/p multiple ablations, atrial flutter s/p ablation, CVA, ostium secundum ASD, hx of RA and scleritis complaining of left lower quadrant abdominal pain since yesterday.   Pt's CT done in ER shows Mild splenomegaly.  Wedge-shaped subcapsular hypodensity in the spleen, suggestive of  infarction. Wedge-shaped subcapsular hypodensities in the left kidney, suggestive of infarction.  Pt was started on IV Heparin, hematology and Cardiology consulted. Pt admitted to medicine service for further mgmt.      Fever 102 overnight  could be related to multiple infarcts  BC sent, will follow up  Cont IV pip-larissa and vancomycin, pending BC results  CT did not show any abscesses  Echo with no vegetations  ID consulted        Splenic and Renal Infarcts  Pt started on IV heparin, will cont pending vascular surgery and hematology eval  Hematology consulted  cont morphine 2gm prn for moderate pain  Cont tylenol for mild to moderate pain  Cardiology input noted, rec vascular SURGERY Eval, contacted Dr. Richardson. and discussed the case, as per Dr. Richardson, no surgical intervention needed based on current CT findings, and pt will need to be on IV heparin.    Hx of Afib  currently in sinus rythm  Cardiology input appreciated as per Card" Unclear if the ASD is congential or iatrogenic from prior ablation  - Regardless, there is a high possibility that the patient's recent thromboembolic issues in multiple vascular territories (brain, spleen, kidney) are related to this. Rec to follow up with pt's structural Heart Cardiologist as outpatient to fix the ASD and PFO in view of multiple infarcts  Echo result noted  LE venous doppler negative for DVT      Hx of HTN  cont home meds    Hx of RA  cont  Mycophenolate 1500mg in the morning and 1000 at night time.  D/w pt and pt's wife to follow up with his Rheumatologist as outpatient for possible rheumatolgic causes of splenic and renal infarcts too     Hx of Scleritis  On prednisone 30mg x 1 week followed by 20mg x 1 week and tapering like this dose as outpatient rec by his ophth.    Hx of recent cataract left eye  Cont eye drops    Constipation  Started on Miralax      d/w pt and pt's wife, D/w Nursing staff and ICU PA   Patient is a 55y Male with a hx iof HTN, and Afib s/p multiple ablations, atrial flutter s/p ablation, CVA, ostium secundum ASD, hx of RA and scleritis complaining of left lower quadrant abdominal pain since yesterday.   Pt's CT done in ER shows Mild splenomegaly.  Wedge-shaped subcapsular hypodensity in the spleen, suggestive of  infarction. Wedge-shaped subcapsular hypodensities in the left kidney, suggestive of infarction.  Pt was started on IV Heparin, hematology and Cardiology consulted. Pt admitted to medicine service for further mgmt.      Fever 102 overnight  could be related to multiple infarcts  BC sent, will follow up  Cont IV pip-larissa and vancomycin, pending BC results  CT did not show any abscesses  Echo with no vegetations  ID consulted        Splenic and Renal Infarcts  Pt started on IV heparin, will cont pending vascular surgery and hematology eval  Hematology consulted  cont morphine 2gm prn for moderate pain  Cont tylenol for mild to moderate pain  Cardiology input noted, rec vascular SURGERY Eval, contacted Dr. Richardson. and discussed the case, as per Dr. Richardson, no surgical intervention needed based on current CT findings, and pt will need to be on IV heparin.    Hx of Afib  currently in sinus rythm  Cardiology input appreciated as per Card" Unclear if the ASD is congential or iatrogenic from prior ablation  - Regardless, there is a high possibility that the patient's recent thromboembolic issues in multiple vascular territories (brain, spleen, kidney) are related to this. Rec to follow up with pt's structural Heart Cardiologist as outpatient to fix the ASD and PFO in view of multiple infarcts  Echo result noted  LE venous doppler negative for DVT      Hx of HTN  cont home meds    Hx of RA  cont  Mycophenolate 1500mg in the morning and 1000 at night time.      Hx of Scleritis  On prednisone 30mg x 1 week followed by 20mg x 1 week and tapering like this dose as outpatient rec by his ophth.    Hx of recent cataract left eye  Cont eye drops    Constipation  Started on Miralax      d/w pt , D/w Nursing staff and ICU PA and hematology

## 2025-07-09 NOTE — CAREGIVER ENGAGEMENT NOTE - CAREGIVER OUTREACH NOTES - FREE TEXT
spouse Maria A Gore [phone]  confirmed address for home post acute :  [address] [place], [place] [id number]

## 2025-07-09 NOTE — CARE COORDINATION ASSESSMENT. - NSCAREPROVIDERS_GEN_ALL_CORE_FT
CARE PROVIDERS:  Accepting Physician: Ayden Bhagat  Access Services: Melissa Gar  Administration: Booker Terrazas  Administration: Lorie Porter  Administration: Maria Guadalupe Strickland  Administration: Maryellen Doll  Admitting: Kenny Bhagathal  Attending: Ayden Bhagat  Case Management: Agata Mendez  Consultant: Melody Albert  Consultant: Roselyn Hawley  Consultant: Efren Tovar  Consultant: Palma Manzanares  Consultant: Angelo Ag  Consultant: Logan Richardson  Consultant: Jay Spencer  Covering Team: Ayden Bhagat  Covering Team: Kamar Parekh  ED Attending: Angelo Lugo  ED Nurse: Brody Richmond  Nurse: Tawana Juárez  Nurse: Diana Millan  Nurse: Juan Manuel Farley  Nurse: Adriana Spring  Override: Diana Millan  PCA/Nursing Assistant: Pati Acuna  PCA/Nursing Assistant: Kevon Russo  Primary Team: Shar East  Registered Dietitian: Haydee Avalos  Respiratory Therapy: Jeny Alcazar  Respiratory Therapy: Vu Whipple  : Louie Moraes  Team: CARMEN  Hospitalists, Team  Team: Team, Critical Care Non Applicable  UR// Supp. Assoc.: Alexa Mcfadden

## 2025-07-09 NOTE — CONSULT NOTE ADULT - CONSULT REASON
Fever
Splenic/renal infarcts, history of atrial fibrillation, stroke
splenic and left renal infarcts

## 2025-07-09 NOTE — DIETITIAN INITIAL EVALUATION ADULT - PERTINENT LABORATORY DATA
07-09    135  |  99  |  18  ----------------------------<  110[H]  4.4   |  31  |  1.23    Ca    8.9      09 Jul 2025 05:30    TPro  7.8  /  Alb  4.0  /  TBili  0.7  /  DBili  x   /  AST  54[H]  /  ALT  62[H]  /  AlkPhos  98  07-08  A1C with Estimated Average Glucose Result: 5.6 % (11-08-24 @ 07:27)

## 2025-07-09 NOTE — CARE COORDINATION ASSESSMENT. - PRO ARRIVE FROM
CM met pt. and spouse Maria A with pt. permission at bedside, pt. A&O x 4 resting comfortably offering no complaints at this time.  CM explained role of CM and availability to assist  with transition to home planning throughout  stay.   Provided CM contact information, transition planning folder and pt. / spouse verbalized understanding.        Pt. States Transition plan is to go home and follow up with his medical team as directed.    CM explained home care expectations, process, insurance provisions and home safety.   Offered list of [facility] and pt declined does not think he will need home care at this time.      Pt. is Self directing and independent PTA lives with spouse and Dtr with one step to enter and 1 step inside.  Pt. spouse here and supportive can assist if needed. Pt. s/p cataract surgery and has an eye shield follow up optho appt on 7/15/25.    States he has the following Doctors:    PCP Dr Lyla DEAL   [phone] seen for surgery pre op  Cardio Terrell seen 2 mo ago for pre op and has appt in sept.  Neuro Dr Brar has appt in sept.  Hematologist Dr. Vazquez   seen dec. 2024.    Pharmacy is [facility] [facility] [address], [place]  DME:  None    Pt. verbalizing understanding of CM role  and in agreement with Transition post acute plans to follow up with medical outpatient team.  CM offered to assist with making follow up appts if needed.     CM to follow./home

## 2025-07-09 NOTE — DIETITIAN INITIAL EVALUATION ADULT - PHYSCIAL ASSESSMENT
overweight no wasting, pt appears obese at this time, not overweight/obese no wasting, pt appears obese at this time, not overweight, monitor wts/obese

## 2025-07-09 NOTE — CARE COORDINATION ASSESSMENT. - MET/SPOKE WITH
With pt. permission met with pt. and spouse Maria A Gore [phone].  Supportive spouse./patient/spouse

## 2025-07-09 NOTE — DIETITIAN INITIAL EVALUATION ADULT - ORAL INTAKE PTA/DIET HISTORY
HIE wts:  108kg Jan 2025  108kg June 2025     HIE wts:  108kg Jan 2025  108kg June 2025 Unable to obtain diet hx at this time, pt is in pain, lethargic, sleeping. Interview deferred at this time.    HIE wts:  108kg Jan 2025  108kg June 2025

## 2025-07-10 ENCOUNTER — TRANSCRIPTION ENCOUNTER (OUTPATIENT)
Age: 55
End: 2025-07-10

## 2025-07-10 VITALS
DIASTOLIC BLOOD PRESSURE: 86 MMHG | SYSTOLIC BLOOD PRESSURE: 141 MMHG | RESPIRATION RATE: 17 BRPM | OXYGEN SATURATION: 98 % | HEART RATE: 81 BPM | TEMPERATURE: 98 F

## 2025-07-10 LAB
ANION GAP SERPL CALC-SCNC: 6 MMOL/L — SIGNIFICANT CHANGE UP (ref 5–17)
APTT 50/50 2HOUR INCUB: 42.2 SEC — HIGH (ref 26.1–37.8)
APTT BLD: 37.4 SEC — SIGNIFICANT CHANGE UP (ref 26.1–37.8)
APTT BLD: 51.4 SEC — HIGH (ref 26.1–36.8)
APTT BLD: 67.7 SEC — HIGH (ref 26.1–36.8)
B2 GLYCOPROT1 IGA SER QL: <2 U/ML — SIGNIFICANT CHANGE UP
B2 GLYCOPROT1 IGG SER-ACNC: <1.4 U/ML — SIGNIFICANT CHANGE UP
B2 GLYCOPROT1 IGM SER-ACNC: <1.5 U/ML — SIGNIFICANT CHANGE UP
BUN SERPL-MCNC: 20 MG/DL — SIGNIFICANT CHANGE UP (ref 7–23)
CALCIUM SERPL-MCNC: 9.2 MG/DL — SIGNIFICANT CHANGE UP (ref 8.4–10.5)
CARDIOLIPIN IGM SER-MCNC: <1.5 MPL U/ML — SIGNIFICANT CHANGE UP
CARDIOLIPIN IGM SER-MCNC: <1.6 GPL U/ML — SIGNIFICANT CHANGE UP
CHLORIDE SERPL-SCNC: 103 MMOL/L — SIGNIFICANT CHANGE UP (ref 96–108)
CO2 SERPL-SCNC: 30 MMOL/L — SIGNIFICANT CHANGE UP (ref 22–31)
CREAT SERPL-MCNC: 0.99 MG/DL — SIGNIFICANT CHANGE UP (ref 0.5–1.3)
DEPRECATED CARDIOLIPIN IGA SER: <2 APL U/ML — SIGNIFICANT CHANGE UP
DRVVT RATIO: 0.93 RATIO — SIGNIFICANT CHANGE UP (ref 0–1.21)
DRVVT SCREEN TO CONFIRM RATIO: SIGNIFICANT CHANGE UP
EGFR: 90 ML/MIN/1.73M2 — SIGNIFICANT CHANGE UP
EGFR: 90 ML/MIN/1.73M2 — SIGNIFICANT CHANGE UP
GLUCOSE SERPL-MCNC: 100 MG/DL — HIGH (ref 70–99)
HCT VFR BLD CALC: 44.3 % — SIGNIFICANT CHANGE UP (ref 39–50)
HGB BLD-MCNC: 14.3 G/DL — SIGNIFICANT CHANGE UP (ref 13–17)
MCHC RBC-ENTMCNC: 27.4 PG — SIGNIFICANT CHANGE UP (ref 27–34)
MCHC RBC-ENTMCNC: 32.3 G/DL — SIGNIFICANT CHANGE UP (ref 32–36)
MCV RBC AUTO: 84.9 FL — SIGNIFICANT CHANGE UP (ref 80–100)
NORMALIZED SCT PPP-RTO: 1 RATIO — SIGNIFICANT CHANGE UP (ref 0–1.16)
NORMALIZED SCT PPP-RTO: SIGNIFICANT CHANGE UP
NRBC # BLD AUTO: 0 K/UL — SIGNIFICANT CHANGE UP (ref 0–0)
NRBC # FLD: 0 K/UL — SIGNIFICANT CHANGE UP (ref 0–0)
NRBC BLD AUTO-RTO: 0 /100 WBCS — SIGNIFICANT CHANGE UP (ref 0–0)
PAT CTL 2H: 42.2 SEC — HIGH (ref 26.1–37.8)
PLATELET # BLD AUTO: 241 K/UL — SIGNIFICANT CHANGE UP (ref 150–400)
PMV BLD: 8.9 FL — SIGNIFICANT CHANGE UP (ref 7–13)
POTASSIUM SERPL-MCNC: 4.1 MMOL/L — SIGNIFICANT CHANGE UP (ref 3.5–5.3)
POTASSIUM SERPL-SCNC: 4.1 MMOL/L — SIGNIFICANT CHANGE UP (ref 3.5–5.3)
RBC # BLD: 5.22 M/UL — SIGNIFICANT CHANGE UP (ref 4.2–5.8)
RBC # FLD: 12.9 % — SIGNIFICANT CHANGE UP (ref 10.3–14.5)
SODIUM SERPL-SCNC: 139 MMOL/L — SIGNIFICANT CHANGE UP (ref 135–145)
WBC # BLD: 7.37 K/UL — SIGNIFICANT CHANGE UP (ref 3.8–10.5)
WBC # FLD AUTO: 7.37 K/UL — SIGNIFICANT CHANGE UP (ref 3.8–10.5)

## 2025-07-10 PROCEDURE — 93356 MYOCRD STRAIN IMG SPCKL TRCK: CPT

## 2025-07-10 PROCEDURE — 93005 ELECTROCARDIOGRAM TRACING: CPT

## 2025-07-10 PROCEDURE — 80048 BASIC METABOLIC PNL TOTAL CA: CPT

## 2025-07-10 PROCEDURE — 85025 COMPLETE CBC W/AUTO DIFF WBC: CPT

## 2025-07-10 PROCEDURE — 85613 RUSSELL VIPER VENOM DILUTED: CPT

## 2025-07-10 PROCEDURE — 86146 BETA-2 GLYCOPROTEIN ANTIBODY: CPT

## 2025-07-10 PROCEDURE — 85732 THROMBOPLASTIN TIME PARTIAL: CPT

## 2025-07-10 PROCEDURE — 80053 COMPREHEN METABOLIC PANEL: CPT

## 2025-07-10 PROCEDURE — 93970 EXTREMITY STUDY: CPT

## 2025-07-10 PROCEDURE — 96365 THER/PROPH/DIAG IV INF INIT: CPT

## 2025-07-10 PROCEDURE — 85730 THROMBOPLASTIN TIME PARTIAL: CPT

## 2025-07-10 PROCEDURE — 83690 ASSAY OF LIPASE: CPT

## 2025-07-10 PROCEDURE — 86147 CARDIOLIPIN ANTIBODY EA IG: CPT

## 2025-07-10 PROCEDURE — 93306 TTE W/DOPPLER COMPLETE: CPT

## 2025-07-10 PROCEDURE — 81003 URINALYSIS AUTO W/O SCOPE: CPT

## 2025-07-10 PROCEDURE — 99285 EMERGENCY DEPT VISIT HI MDM: CPT

## 2025-07-10 PROCEDURE — 74177 CT ABD & PELVIS W/CONTRAST: CPT

## 2025-07-10 PROCEDURE — 87040 BLOOD CULTURE FOR BACTERIA: CPT

## 2025-07-10 PROCEDURE — 76376 3D RENDER W/INTRP POSTPROCES: CPT

## 2025-07-10 PROCEDURE — 85027 COMPLETE CBC AUTOMATED: CPT

## 2025-07-10 PROCEDURE — 85610 PROTHROMBIN TIME: CPT

## 2025-07-10 PROCEDURE — 99232 SBSQ HOSP IP/OBS MODERATE 35: CPT

## 2025-07-10 PROCEDURE — 36415 COLL VENOUS BLD VENIPUNCTURE: CPT

## 2025-07-10 RX ORDER — POLYETHYLENE GLYCOL 3350 17 G/17G
17 POWDER, FOR SOLUTION ORAL
Qty: 0 | Refills: 0 | DISCHARGE
Start: 2025-07-10

## 2025-07-10 RX ORDER — PREDNISONE 20 MG/1
3 TABLET ORAL
Qty: 0 | Refills: 0 | DISCHARGE
Start: 2025-07-10

## 2025-07-10 RX ORDER — CEFUROXIME SODIUM 1.5 G
1 VIAL (EA) INJECTION
Qty: 14 | Refills: 0
Start: 2025-07-10 | End: 2025-07-16

## 2025-07-10 RX ORDER — APIXABAN 5 MG/1
1 TABLET, FILM COATED ORAL
Qty: 60 | Refills: 0
Start: 2025-07-10 | End: 2025-08-08

## 2025-07-10 RX ORDER — OFLOXACIN 3 MG/ML
1 SOLUTION OPHTHALMIC
Qty: 0 | Refills: 0 | DISCHARGE
Start: 2025-07-10

## 2025-07-10 RX ORDER — APIXABAN 5 MG/1
5 TABLET, FILM COATED ORAL EVERY 12 HOURS
Refills: 0 | Status: DISCONTINUED | OUTPATIENT
Start: 2025-07-10 | End: 2025-07-10

## 2025-07-10 RX ADMIN — AMLODIPINE BESYLATE 5 MILLIGRAM(S): 10 TABLET ORAL at 05:57

## 2025-07-10 RX ADMIN — MYCOPHENOLATE MOFETIL 1000 MILLIGRAM(S): 500 TABLET, FILM COATED ORAL at 21:35

## 2025-07-10 RX ADMIN — OFLOXACIN 1 DROP(S): 3 SOLUTION OPHTHALMIC at 12:22

## 2025-07-10 RX ADMIN — PREDNISONE 30 MILLIGRAM(S): 20 TABLET ORAL at 05:56

## 2025-07-10 RX ADMIN — MYCOPHENOLATE MOFETIL 1500 MILLIGRAM(S): 500 TABLET, FILM COATED ORAL at 12:22

## 2025-07-10 RX ADMIN — HEPARIN SODIUM 1400 UNIT(S)/HR: 1000 INJECTION INTRAVENOUS; SUBCUTANEOUS at 07:21

## 2025-07-10 RX ADMIN — Medication 650 MILLIGRAM(S): at 08:15

## 2025-07-10 RX ADMIN — Medication 1 DROP(S): at 05:56

## 2025-07-10 RX ADMIN — APIXABAN 5 MILLIGRAM(S): 5 TABLET, FILM COATED ORAL at 18:43

## 2025-07-10 RX ADMIN — Medication 1 DROP(S): at 12:23

## 2025-07-10 RX ADMIN — CEFTRIAXONE 100 MILLIGRAM(S): 500 INJECTION, POWDER, FOR SOLUTION INTRAMUSCULAR; INTRAVENOUS at 18:43

## 2025-07-10 RX ADMIN — Medication 650 MILLIGRAM(S): at 00:31

## 2025-07-10 RX ADMIN — Medication 650 MILLIGRAM(S): at 00:05

## 2025-07-10 RX ADMIN — OFLOXACIN 1 DROP(S): 3 SOLUTION OPHTHALMIC at 05:56

## 2025-07-10 RX ADMIN — Medication 650 MILLIGRAM(S): at 09:15

## 2025-07-10 RX ADMIN — Medication 1 DROP(S): at 17:33

## 2025-07-10 RX ADMIN — Medication 1 DROP(S): at 00:09

## 2025-07-10 RX ADMIN — HEPARIN SODIUM 1400 UNIT(S)/HR: 1000 INJECTION INTRAVENOUS; SUBCUTANEOUS at 00:06

## 2025-07-10 RX ADMIN — OFLOXACIN 1 DROP(S): 3 SOLUTION OPHTHALMIC at 00:09

## 2025-07-10 RX ADMIN — OFLOXACIN 1 DROP(S): 3 SOLUTION OPHTHALMIC at 17:33

## 2025-07-10 RX ADMIN — ATORVASTATIN CALCIUM 80 MILLIGRAM(S): 80 TABLET, FILM COATED ORAL at 21:36

## 2025-07-10 NOTE — PROGRESS NOTE ADULT - ASSESSMENT
Patient is a 55y Male with a hx iof HTN, and Afib s/p multiple ablations, atrial flutter s/p ablation, CVA, ostium secundum ASD, hx of RA and scleritis complaining of left lower quadrant abdominal pain since yesterday.   Pt's CT done in ER shows Mild splenomegaly.  Wedge-shaped subcapsular hypodensity in the spleen, suggestive of spleen  infarction, started on iv heparin ,     acute splenic infarction   -appear provoked left renal and splenic infarct as with known ASD, cardiac arrythmia, CVA hx and off Eliquis x 48+hours  --however, will check Lupus Anticoagulant and antiphospholipids, as w Rheum Arthritis hx, and if positive the better anticoagulant would be Coumadin  -continue IV Heparin for now  -speedy ASD repair may be best therapy  f/y hemonc     Afib   rate controlled    sepsis  fever of 103  rocephin      Hx of HTN  cont home meds    Hx of RA  cont  Mycophenolate 1500mg in the morning and 1000 at night time.      Hx of Scleritis  On prednisone 30mg x 1 week followed by 20mg x 1 week and tapering like this dose as outpatient rec by his ophth.    Hx of recent cataract left eye  Cont eye drops    Constipation  Started on Miralax      d/w pt , D/w Nursing staff and ICU PA and hematology    time spent 64 miunutes

## 2025-07-10 NOTE — DISCHARGE NOTE PROVIDER - NSDCFUSCHEDAPPT_GEN_ALL_CORE_FT
Katarzyna Gruber  [facility] Physician [facility]  OPHTHALM [address]  Scheduled Appointment: 07/15/2025    Jacky Eid  [facility]  CTSURG [address]  Scheduled Appointment: 07/21/2025    Chayo Tejada  [facility]  HEARTVASC [address]  Scheduled Appointment: 09/25/2025    [facility]  OPHTHALM [address]  Scheduled Appointment: 10/02/2025

## 2025-07-10 NOTE — PROGRESS NOTE ADULT - ASSESSMENT
54 yo man adm w acute onset progressive left flank pain, started while in recovery room post left cataract surgery Monday, came to ER Tuesday, CT a/p Tues 7/8 sig for wedge infarct spleen and subcapsular left kidney, also mild splenomegaly 13.6cm  Pt w Rheum Arthritis on Celcept x ~2yrs(had been on MTX) and ~4yrs low dose prednisone for eye scleritis (recent incr dose for cataract surgery), Afib/A flutter post multi ablations 2016--2023 and on Eliquis since last ablation, known atrial septal defect for planned repair after the cataract surgery, hx CVA.  Pt has been off Eliquis since Saturday due to Cataract surgery plan and restarted Monday(after already left flank pain)  Had right cataract surgery one month ago no complications  No family hx thromboembolism  Not on exogenous androgen or other supplements  Normal CBC  Duplex legs no DVT  CXR megative 4/2025  JOHN-intact Tricuspid/aortic/mitral valves, Pulm valve not well seen. Atrial setum aneurysm c/w hx PFO  Started on IV Heparin, left flank pain improved    -appear provoked left renal and splenic infarct as with known ASD, cardiac arrythmia, CVA hx and off Eliquis x 48+hours  -not appear Eliquis failure as was off   lab workup for antiphospholipid ab negative   -however, will check Lupus Anticoagulant and antiphospholipids, as w Rheum Arthritis hx, and if positive the better anticoagulant would be Coumadin  -no indication for coumadin and can be bridged back to eliquis for further cardiology workup as an outpatient  -speedy ASD repair may be best therapy    please call if additional evaluation required.

## 2025-07-10 NOTE — PROGRESS NOTE ADULT - ASSESSMENT
The patient is a 55 year old male with a history of HTN, atrial fibrillation s/p multiple ablations, atrial flutter s/p ablation, CVA, ostium secundum ASD who presents with abdominal pain, found to have splenic and renal infarcts.    Plan:  - ECG with sinus rhythm and no evidence of ischemia/infarction  - No atrial fibrillation noted in ED; the patient also reportedly had a recent cardiac monitor showing no atrial fibrillation as well  - Echo with normal LV systolic function, no significant valve issues, RV enlargement  - The patient had a JOHN 11/24 showing no SHANKAR clot. There was an ostium secundum ASD and concomitant PFO.  - Unclear if the ASD is congential or iatrogenic from prior ablation  - Regardless, there is a high possibility that the patient's recent thromboembolic issues in multiple vascular territories (brain, spleen, kidney) are related to this  - LE venous duplex negative for DVT  - Recommend restarting apixaban 5 mg bid and discontinuing heparin  - He will need to follow-up back up with his structural heart doctor for ASD closure given multiple thromboembolic events and right-sided enlargement

## 2025-07-10 NOTE — DISCHARGE NOTE NURSING/CASE MANAGEMENT/SOCIAL WORK - PATIENT PORTAL LINK FT
You can access the FollowMyHealth Patient Portal offered by [facility] by registering at the following website: [email or web address]. By joining [email or web address]’s FollowMyHealth portal, you will also be able to view your health information using other applications (apps) compatible with our system.

## 2025-07-10 NOTE — DISCHARGE NOTE PROVIDER - HOSPITAL COURSE
55y Male with a hx iof HTN, and Afib s/p multiple ablations, atrial flutter s/p ablation, CVA, ostium secundum ASD, hx of RA and scleritis complaining of left lower quadrant abdominal pain since yesterday.   Pt's CT done in ER shows Mild splenomegaly.  Wedge-shaped subcapsular hypodensity in the spleen, suggestive of spleen  infarction, started on iv heparin ,     acute splenic infarction   -appear provoked left renal and splenic infarct as with known ASD, cardiac arrythmia, CVA hx and off Eliquis x 48+hours  hemonc eval appreciated, no signs of antiphospholipids syndrome  continue on eliquis   d/c planning in am     UTI  on rocephin  will change to po ceftin    Afib   rate controlled      Hx of HTN  cont home meds    Hx of RA  cont  Mycophenolate 1500mg in the morning and 1000 at night time.      Hx of Scleritis  On prednisone 30mg x 1 week followed by 20mg x 1 week and tapering like this dose as outpatient rec by his ophth.    Hx of recent cataract left eye  Cont eye drops    Constipation  Started on Miralax

## 2025-07-10 NOTE — DISCHARGE NOTE PROVIDER - NSDCMRMEDTOKEN_GEN_ALL_CORE_FT
amLODIPine 5 mg oral tablet: 1 tab(s) orally once a day  apixaban 5 mg oral tablet: 1 tab(s) orally every 12 hours MDD: 10  atorvastatin 80 mg oral tablet: 1 tab(s) orally once a day (at bedtime)  cefuroxime 500 mg oral tablet: 1 tab(s) orally 2 times a day  metoprolol succinate 25 mg oral tablet, extended release: 1 tab(s) orally once a day  mycophenolate mofetil 500 mg oral tablet: 3 tab(s) orally once a day  mycophenolate mofetil 500 mg oral tablet: 2 tab(s) orally once a day (at bedtime)  ofloxacin 0.3% ophthalmic solution: 1 drop(s) to each affected eye 4 times a day  polyethylene glycol 3350 oral powder for reconstitution: 17 gram(s) orally 2 times a day As needed Constipation  prednisoLONE acetate 1% ophthalmic suspension: 1 drop(s) to each affected eye 4 times a day  predniSONE 10 mg oral tablet: 3 tab(s) orally once a day

## 2025-07-10 NOTE — DISCHARGE NOTE PROVIDER - NSCORESITESY/N_GEN_A_CORE_RD
No pt contact, pracitioner advised pt of dc instructions and gave paperwork, pt left prior to rn contact   No

## 2025-07-10 NOTE — PROGRESS NOTE ADULT - SUBJECTIVE AND OBJECTIVE BOX
Chief Complaint: Abdominal pain    Interval Events: No events overnight.    Review of Systems:  General: No fevers, chills, weight gain  Skin: No rashes, color changes  Cardiovascular: No chest pain, orthopnea  Respiratory: No shortness of breath, cough  Gastrointestinal: No nausea, +abdominal pain  Genitourinary: No incontinence, pain with urination  Musculoskeletal: No pain, swelling, decreased range of motion  Neurological: No headache, weakness  Psychiatric: No depression, anxiety  Endocrine: No weight gain, increased thirst  All other systems are comprehensively negative.    Physical Exam:  Vital Signs Last 24 Hrs  T(C): 36.3 (10 Jul 2025 07:48), Max: 37.6 (09 Jul 2025 14:48)  T(F): 97.3 (10 Jul 2025 07:48), Max: 99.7 (09 Jul 2025 14:48)  HR: 73 (10 Jul 2025 05:55) (73 - 83)  BP: 121/80 (10 Jul 2025 05:55) (90/77 - 128/77)  BP(mean): 92 (10 Jul 2025 05:55) (83 - 100)  RR: 19 (10 Jul 2025 05:55) (15 - 21)  SpO2: 96% (10 Jul 2025 05:55) (94% - 96%)  Parameters below as of 09 Jul 2025 16:00  Patient On (Oxygen Delivery Method): room air  General: NAD  HEENT: MMM  Neck: No JVD, no carotid bruit  Lungs: CTAB  CV: RRR, nl S1/S2, no M/R/G  Abdomen: S/NT/ND, +BS  Extremities: No LE edema, no cyanosis  Neuro: AAOx3, non-focal  Skin: No rash    Labs:             07-10    139  |  103  |  20  ----------------------------<  100[H]  4.1   |  30  |  0.99    Ca    9.2      10 Jul 2025 06:00    TPro  7.8  /  Alb  4.0  /  TBili  0.7  /  DBili  x   /  AST  54[H]  /  ALT  62[H]  /  AlkPhos  98  07-08                        14.3   7.37  )-----------( 241      ( 10 Jul 2025 06:00 )             44.3       ECG/Telemetry: Sinus rhythm
Patient is a 55y old  Male who presents with a chief complaint of abdominal pain    per HPI: Patient is a 55y Male with a hx iof HTN, and Afib s/p multiple ablations, atrial flutter s/p ablation, CVA, ostium secundum ASD, hx of RA and scleritis complaining of left lower quadrant abdominal pain since yesterday.  Patient denies fevers chills nausea vomiting diarrhea dysuria hematuria frequency.  Patient relates he had cataract surgery on his left eye yesterday symptoms began after he was home from the surgery.  Pt's CT done in ER shows  Mild splenomegaly. Wedge-shaped subcapsular hypodensity in the spleen, suggestive of  infarction. Wedge-shaped subcapsular hypodensities in the left kidney, suggestive of infarction. Pt was started on IV Heparin, hematology and Cardiology consulted. Pt admitted to medicine service for further mgmt.   (08 Jul 2025 15:48) (09 Jul 2025 11:24)      INTERVAL HPI/OVERNIGHT EVENTS:   Pt reports feeling fine, No SOB, chest pain, n/v, diarrhea, no urinary sxs, pain is well controlled, was febrile overnight with some chills, denies any cough, phlegm, sore throat, recent sick contacts, no fever prior to this.  ALso reports supposed to be taking Prednisone 30mg tapering dose( 1 weeks) then 20 mg ( 1 week) as rec by his ophth for his Scleritis    MEDICATIONS  (STANDING):  amLODIPine   Tablet 5 milliGRAM(s) Oral daily  atorvastatin 80 milliGRAM(s) Oral at bedtime  heparin  Infusion.  Unit(s)/Hr (17 mL/Hr) IV Continuous <Continuous>  mycophenolate mofetil 1500 milliGRAM(s) Oral daily  mycophenolate mofetil 1000 milliGRAM(s) Oral at bedtime  ofloxacin 0.3% Solution 1 Drop(s) Left EYE four times a day  piperacillin/tazobactam IVPB.. 3.375 Gram(s) IV Intermittent every 8 hours  prednisoLONE acetate 1% Suspension 1 Drop(s) Left EYE four times a day  predniSONE   Tablet 30 milliGRAM(s) Oral daily  vancomycin  IVPB      vancomycin  IVPB 1500 milliGRAM(s) IV Intermittent every 12 hours    MEDICATIONS  (PRN):  acetaminophen     Tablet .. 650 milliGRAM(s) Oral every 6 hours PRN Temp greater or equal to 38C (100.4F), Mild Pain (1 - 3)  aluminum hydroxide/magnesium hydroxide/simethicone Suspension 30 milliLiter(s) Oral every 4 hours PRN Dyspepsia  heparin   Injectable 7500 Unit(s) IV Push every 6 hours PRN For aPTT less than 40  heparin   Injectable 3500 Unit(s) IV Push every 6 hours PRN For aPTT between 40 - 57  melatonin 3 milliGRAM(s) Oral at bedtime PRN Insomnia  morphine  - Injectable 2 milliGRAM(s) IV Push every 6 hours PRN Moderate Pain (4 - 6)  ondansetron Injectable 4 milliGRAM(s) IV Push every 8 hours PRN Nausea and/or Vomiting  polyethylene glycol 3350 17 Gram(s) Oral two times a day PRN Constipation      Allergies    losartan (Other)    Intolerances        REVIEW OF SYSTEMS:  A 10 point review of system is negative except for ones explained above    Vital Signs Last 24 Hrs  T(C): 37.3 (09 Jul 2025 08:33), Max: 39 (08 Jul 2025 22:00)  T(F): 99.2 (09 Jul 2025 08:33), Max: 102.2 (08 Jul 2025 22:00)  HR: 85 (09 Jul 2025 08:00) (68 - 86)  BP: 112/85 (09 Jul 2025 08:00) (112/65 - 139/82)  BP(mean): 94 (09 Jul 2025 08:00) (80 - 94)  RR: 20 (09 Jul 2025 08:00) (16 - 20)  SpO2: 94% (09 Jul 2025 08:00) (94% - 97%)    Parameters below as of 09 Jul 2025 08:00  Patient On (Oxygen Delivery Method): room air        PHYSICAL EXAM:  GENERAL: NAD, well-groomed, well-developed  HEAD:  Atraumatic, Normocephalic  EYES: EOMI, PERRLA, conjunctiva and sclera clear  ENMT: Moist mucous membranes, Good dentition, No lesions; No tonsillar erythema, exudates, or enlargement  NECK: Supple, No JVD, Normal thyroid  NERVOUS SYSTEM:  Alert & Oriented X3, Good concentration; All 4 extremities mobile, no gross sensory deficits.   CHEST/LUNG: Clear to auscultation bilaterally; No rales, rhonchi, wheezing, or rubs  HEART: Regular rate and rhythm; No murmurs, rubs, or gallops  ABDOMEN: Soft, Nontender, Nondistended; Bowel sounds present  EXTREMITIES:  2+ Peripheral Pulses, No clubbing, cyanosis, or edema  LYMPH: No lymphadenopathy noted  SKIN: No rashes or lesions    LABS:                        13.7   9.64  )-----------( 259      ( 09 Jul 2025 05:30 )             42.5     09 Jul 2025 05:30    135    |  99     |  18     ----------------------------<  110    4.4     |  31     |  1.23     Ca    8.9        09 Jul 2025 05:30      PT/INR - ( 08 Jul 2025 11:33 )   PT: 12.3 sec;   INR: 1.06 ratio         PTT - ( 09 Jul 2025 09:49 )  PTT:83.0 sec  Urinalysis Basic - ( 09 Jul 2025 05:30 )    Color: x / Appearance: x / SG: x / pH: x  Gluc: 110 mg/dL / Ketone: x  / Bili: x / Urobili: x   Blood: x / Protein: x / Nitrite: x   Leuk Esterase: x / RBC: x / WBC x   Sq Epi: x / Non Sq Epi: x / Bacteria: x      CAPILLARY BLOOD GLUCOSE          RADIOLOGY & ADDITIONAL TESTS:    Imaging Personally Reviewed:  [ ] YES     Consultant(s) Notes Reviewed:      Care Discussed with Consultants/Other Providers:    Advanced Directives: [ ] DNR  [ ] No feeding tube  [ ] MOLST in chart  [ ] MOLST completed today  [ ] Unknown  
     SHERIE GUSMAN is a 55yMale , patient examined and chart reviewed.     INTERVAL HPI/ OVERNIGHT EVENTS:   Feels well No fevers.  No concerning events.    PAST MEDICAL & SURGICAL HISTORY:  Rheumatoid arthritis  Atrial fibrillation and flutter  Paroxysmal atrial fibrillation  Scleritis  HTN (hypertension)  Benign essential hypertension  HADLEY (obstructive sleep apnea)  Childhood asthma  H/O cardiac radiofrequency ablation  H/O cardiac radiofrequency ablation      For details regarding the patient's social history, family history, and other miscellaneous elements, please refer the initial infectious diseases consultation and/or the admitting history and physical examination for this admission.    ROS:  CONSTITUTIONAL:  Negative fever or chills  EYES:  Negative  blurry vision or double vision  CARDIOVASCULAR:  Negative for chest pain or palpitations  RESPIRATORY:  Negative for cough, wheezing, or SOB   GASTROINTESTINAL:  Negative for nausea, vomiting, diarrhea, constipation, or abdominal pain  GENITOURINARY:  Negative frequency, urgency or dysuria  NEUROLOGIC:  No headache, confusion, dizziness, lightheadedness  All other systems were reviewed and are negative     ALLERGIES:  losartan (Other)      Current inpatient medications :    ANTIBIOTICS/RELEVANT:  cefTRIAXone   IVPB 2000 milliGRAM(s) IV Intermittent every 24 hours      acetaminophen     Tablet .. 650 milliGRAM(s) Oral every 6 hours PRN  aluminum hydroxide/magnesium hydroxide/simethicone Suspension 30 milliLiter(s) Oral every 4 hours PRN  amLODIPine   Tablet 5 milliGRAM(s) Oral daily  apixaban 5 milliGRAM(s) Oral every 12 hours  atorvastatin 80 milliGRAM(s) Oral at bedtime  melatonin 3 milliGRAM(s) Oral at bedtime PRN  morphine  - Injectable 2 milliGRAM(s) IV Push every 6 hours PRN  ofloxacin 0.3% Solution 1 Drop(s) Left EYE four times a day  ondansetron Injectable 4 milliGRAM(s) IV Push every 8 hours PRN  polyethylene glycol 3350 17 Gram(s) Oral two times a day PRN  prednisoLONE acetate 1% Suspension 1 Drop(s) Left EYE four times a day      Objective:    07-09 @ 07:01  -  07-10 @ 07:00  --------------------------------------------------------  IN: 626 mL / OUT: 2790 mL / NET: -2164 mL    07-10 @ 07:01  -  07-11 @ 00:27  --------------------------------------------------------  IN: 0 mL / OUT: 225 mL / NET: -225 mL      T(C): 36.8 (07-10-25 @ 21:45), Max: 37.2 (07-10-25 @ 16:29)  HR: 81 (07-10-25 @ 21:45) (73 - 81)  BP: 141/86 (07-10-25 @ 21:45) (119/78 - 141/86)  RR: 17 (07-10-25 @ 21:45) (17 - 23)  SpO2: 98% (07-10-25 @ 21:45) (94% - 98%)        Physical Exam:  General: well developed well nourished, in no acute distress  Neck: supple, trachea midline  Lungs: clear, no wheeze/rhonchi  Cardiovascular: regular rate and rhythm, S1 S2  Abdomen: soft, nontender,  bowel sounds normal  Neurological: alert and oriented x3  Skin: no rash  Extremities: no edema      LABS:                        14.3   7.37  )-----------( 241      ( 10 Jul 2025 06:00 )             44.3       07-10    139  |  103  |  20  ----------------------------<  100[H]  4.1   |  30  |  0.99    Ca    9.2      10 Jul 2025 06:00        PTT - ( 10 Jul 2025 06:00 )  PTT:67.7 sec    MICROBIOLOGY:      RADIOLOGY & ADDITIONAL STUDIES:  ACC: [id number] EXAM:  CT ABDOMEN AND PELVIS IC   ORDERED BY: JOHNNY VINCENT     PROCEDURE DATE:  07/08/2025          INTERPRETATION:  CLINICAL INFORMATION: Left lower quadrant abdominal pain    COMPARISON: None.    CONTRAST/COMPLICATIONS:  IV Contrast: Omnipaque 350  90 cc administered   10 cc discarded  Oral Contrast: NONE.    PROCEDURE:  CT of the Abdomen and Pelvis was performed.  Sagittal and coronal reformats were performed.    FINDINGS:  LOWER CHEST: Small bilateral atelectasis    LIVER: Within normal limits.  BILE DUCTS: Normal caliber.  GALLBLADDER: Within normal limits.  SPLEEN: Wedge-shaped subcapsular hypodensity in the spleen, suggestive of   infarction. Mild splenomegaly measuring 13.6 cm.  PANCREAS: Within normal limits.  ADRENALS: Within normal limits.  KIDNEYS/URETERS: A few small hypodense lesions in the kidneys   bilaterally, too small to characterize. Wedge-shaped subcapsular   hypodensities in the left kidney, suggestive of infarction.    BLADDER: Underdistended.  REPRODUCTIVE ORGANS: The prostate and seminal vesicles appear grossly   unremarkable.    BOWEL: No bowel obstruction or grossly thickened bowel wall. Colonic   diverticulosis without evidence for diverticulitis. Appendix within   normal limits. Moderate amount of stool in the ascending and transverse   colon.  PERITONEUM/RETROPERITONEUM: Within normal limits.  VESSELS: Within normal limits.  LYMPH NODES: No lymphadenopathy.  ABDOMINAL WALL: Fat-containing inguinal hernias bilaterally.  BONES: Within normal limits.    IMPRESSION:  Mild splenomegaly.    Wedge-shaped subcapsular hypodensity in the spleen, suggestive of   infarction. Wedge-shaped subcapsular hypodensities in the left kidney,   suggestive of infarction.        Assessment :   54YO M PMH HTN, and Afib s/p multiple ablations, atrial flutter s/p ablation, CVA, ostium secundum ASD, hx of RA and scleritis on cellcept complaining of left lower quadrant abdominal pain since yesterday.  Patient denies fevers chills nausea vomiting diarrhea dysuria hematuria frequency.  Patient relates he had cataract surgery on his left eye yesterday symptoms began after he was home from the surgery.  CT done in ER showsMild splenomegaly. Wedge-shaped subcapsular hypodensity in the spleen, suggestive of  infarction. Wedge-shaped subcapsular hypodensities in the left kidney, suggestive of infarction. Pt was started on IV Heparin, hematology and Cardiology consulted. Spiked fever to 102.2 with chills last night.   Fevers likely sec splenic/renal infarction  Rule out endocarditis if blood cultures positive- low suspicion  No further fevers  Echo neg vegetations  Heme eval noted    Plan :   On Rocephin 2gms IV daily   Fu cultures- if neg can dc antibiotics  Trend temps and cbc  Stable from ID standpoint      Advance Directives- Full code  Current Medications are documented.   Drug-drug interactions reviewed.    Continue with present regiment.  Appropriate use of antibiotics and adverse effects reviewed.      I have discussed the above plan of care with patient in detail. He expressed understanding of the  treatment plan . Risks, benefits and alternatives discussed in detail. I have asked if he has any questions or concerns and appropriately addressed them to the best of my ability .    > 35 minutes were spent in direct patient care reviewing notes, medications ,labs data/ imaging , discussion with multidisciplinary team.    Thank you for allowing me to participate in care of your patient .    Roselyn Hawley MD  Infectious Disease  [phone]    Individualized infection control protocols for an individual patient based on their diagnosis and risks in order to reduce risk of disease transmission followed. Coordinating with  infection prevention and control team members to enable healthcare facility staff to safely care for patient.  Managing infection prevention and treatment protocols associated with transitions of care for complex patients.  In-depth patient chart review that entails going back farther in time and assessing the complete breadth of all health care interactions, with higher-level synthesis for complex diagnoses.  Engaging in complex medical decision-making associated with antimicrobial prescribing including considerations such as antimicrobial resistance patterns, emergence of new variants/strains, recent antibiotic exposure, interactions/complications from comorbidities including concurrent infections, public health considerations to minimize development of antimicrobial resistance, and emerging and re-emerging infections.       
Chief Complaint: Abdominal pain    Interval Events: No events overnight.    Review of Systems:  General: No fevers, chills, weight gain  Skin: No rashes, color changes  Cardiovascular: No chest pain, orthopnea  Respiratory: No shortness of breath, cough  Gastrointestinal: No nausea, +abdominal pain  Genitourinary: No incontinence, pain with urination  Musculoskeletal: No pain, swelling, decreased range of motion  Neurological: No headache, weakness  Psychiatric: No depression, anxiety  Endocrine: No weight gain, increased thirst  All other systems are comprehensively negative.    Physical Exam:  Vitals:        Vital Signs Last 24 Hrs  T(C): 37.3 (09 Jul 2025 08:33), Max: 39 (08 Jul 2025 22:00)  T(F): 99.2 (09 Jul 2025 08:33), Max: 102.2 (08 Jul 2025 22:00)  HR: 85 (09 Jul 2025 08:00) (68 - 86)  BP: 112/85 (09 Jul 2025 08:00) (112/65 - 139/82)  BP(mean): 94 (09 Jul 2025 08:00) (80 - 94)  RR: 20 (09 Jul 2025 08:00) (16 - 20)  SpO2: 94% (09 Jul 2025 08:00) (94% - 98%)    Parameters below as of 09 Jul 2025 08:00  Patient On (Oxygen Delivery Method): room air      General: NAD  HEENT: MMM  Neck: No JVD, no carotid bruit  Lungs: CTAB  CV: RRR, nl S1/S2, no M/R/G  Abdomen: S/NT/ND, +BS  Extremities: No LE edema, no cyanosis  Neuro: AAOx3, non-focal  Skin: No rash    Labs:                        13.7   9.64  )-----------( 259      ( 09 Jul 2025 05:30 )             42.5     07-09    135  |  99  |  18  ----------------------------<  110[H]  4.4   |  31  |  1.23    Ca    8.9      09 Jul 2025 05:30    TPro  7.8  /  Alb  4.0  /  TBili  0.7  /  DBili  x   /  AST  54[H]  /  ALT  62[H]  /  AlkPhos  98  07-08        PT/INR - ( 08 Jul 2025 11:33 )   PT: 12.3 sec;   INR: 1.06 ratio         PTT - ( 08 Jul 2025 21:02 )  PTT:141.7 sec    ECG/Telemetry: Sinus rhythm
Interval History:  continues on heparin    Chart reviewed and events noted;   Overnight events:    MEDICATIONS  (STANDING):  amLODIPine   Tablet 5 milliGRAM(s) Oral daily  apixaban 5 milliGRAM(s) Oral every 12 hours  atorvastatin 80 milliGRAM(s) Oral at bedtime  cefTRIAXone   IVPB 2000 milliGRAM(s) IV Intermittent every 24 hours  mycophenolate mofetil 1500 milliGRAM(s) Oral daily  mycophenolate mofetil 1000 milliGRAM(s) Oral at bedtime  ofloxacin 0.3% Solution 1 Drop(s) Left EYE four times a day  prednisoLONE acetate 1% Suspension 1 Drop(s) Left EYE four times a day    MEDICATIONS  (PRN):  acetaminophen     Tablet .. 650 milliGRAM(s) Oral every 6 hours PRN Temp greater or equal to 38C (100.4F), Mild Pain (1 - 3)  aluminum hydroxide/magnesium hydroxide/simethicone Suspension 30 milliLiter(s) Oral every 4 hours PRN Dyspepsia  melatonin 3 milliGRAM(s) Oral at bedtime PRN Insomnia  morphine  - Injectable 2 milliGRAM(s) IV Push every 6 hours PRN Moderate Pain (4 - 6)  ondansetron Injectable 4 milliGRAM(s) IV Push every 8 hours PRN Nausea and/or Vomiting  polyethylene glycol 3350 17 Gram(s) Oral two times a day PRN Constipation      Vital Signs Last 24 Hrs  T(C): 36.4 (10 Jul 2025 20:46), Max: 37.2 (10 Jul 2025 16:29)  T(F): 97.6 (10 Jul 2025 20:46), Max: 99 (10 Jul 2025 16:29)  HR: 79 (10 Jul 2025 16:00) (73 - 81)  BP: 120/99 (10 Jul 2025 16:00) (119/78 - 135/85)  BP(mean): 106 (10 Jul 2025 16:00) (86 - 106)  RR: 18 (10 Jul 2025 16:00) (18 - 23)  SpO2: 96% (10 Jul 2025 16:00) (94% - 97%)    Parameters below as of 10 Jul 2025 16:00  Patient On (Oxygen Delivery Method): room air        PHYSICAL EXAM  General: adult in NAD  HEENT: clear oropharynx, anicteric sclera, pink conjunctivae  Neck: supple  CV: normal S1S2 with no murmur rubs or gallops  Lungs: clear to auscultation, no wheezes, no rhales  Abdomen: soft non-tender non-distended, no hepato/splenomegaly  Ext: no clubbing cyanosis or edema  Skin: no rashes and no petichiae  Neuro: alert and oriented X3 no focal deficits      LABS:  CBC Full  -  ( 10 Jul 2025 06:00 )  WBC Count : 7.37 K/uL  RBC Count : 5.22 M/uL  Hemoglobin : 14.3 g/dL  Hematocrit : 44.3 %  Platelet Count - Automated : 241 K/uL  Mean Cell Volume : 84.9 fl  Mean Cell Hemoglobin : 27.4 pg  Mean Cell Hemoglobin Concentration : 32.3 g/dL  Auto Neutrophil # : x  Auto Lymphocyte # : x  Auto Monocyte # : x  Auto Eosinophil # : x  Auto Basophil # : x  Auto Neutrophil % : x  Auto Lymphocyte % : x  Auto Monocyte % : x  Auto Eosinophil % : x  Auto Basophil % : x    07-10    139  |  103  |  20  ----------------------------<  100[H]  4.1   |  30  |  0.99    Ca    9.2      10 Jul 2025 06:00      PTT - ( 10 Jul 2025 06:00 )  PTT:67.7 sec    fe studies      WBC trend  7.37 K/uL (07-10-25 @ 06:00)  9.64 K/uL (07-09-25 @ 05:30)  8.94 K/uL (07-08-25 @ 21:02)  9.71 K/uL (07-08-25 @ 11:33)      Hgb trend  14.3 g/dL (07-10-25 @ 06:00)  13.7 g/dL (07-09-25 @ 05:30)  13.9 g/dL (07-08-25 @ 21:02)  14.9 g/dL (07-08-25 @ 11:33)      plt trend  241 K/uL (07-10-25 @ 06:00)  259 K/uL (07-09-25 @ 05:30)  254 K/uL (07-08-25 @ 21:02)  323 K/uL (07-08-25 @ 11:33)        RADIOLOGY & ADDITIONAL STUDIES:    
Patient is a 55y old  Male who presents with a chief complaint of abdominal pain (09 Jul 2025 15:52)        HPI:  Patient is a 55y Male with a hx iof HTN, and Afib s/p multiple ablations, atrial flutter s/p ablation, CVA, ostium secundum ASD, hx of RA and scleritis complaining of left lower quadrant abdominal pain since yesterday.  Patient denies fevers chills nausea vomiting diarrhea dysuria hematuria frequency.  Patient relates he had cataract surgery on his left eye yesterday symptoms began after he was home from the surgery.  Pt's CT done in ER shows  Mild splenomegaly.  Wedge-shaped subcapsular hypodensity in the spleen, suggestive of  infarction. Wedge-shaped subcapsular hypodensities in the left kidney, suggestive of infarction.    Pt was started on IV Heparin, hematology and Cardiology consulted. Pt admitted to medicine service for further mgmt.   (08 Jul 2025 15:48)      SUBJECTIVE & OBJECTIVE: Pt seen and examined at bedside. nad    PHYSICAL EXAM:  T(C): 36.9 (07-10-25 @ 12:01), Max: 37.6 (07-09-25 @ 14:48)  HR: 74 (07-10-25 @ 08:00) (73 - 83)  BP: 119/78 (07-10-25 @ 08:00) (90/77 - 124/68)  RR: 23 (07-10-25 @ 08:00) (19 - 23)  SpO2: 94% (07-10-25 @ 08:00) (94% - 96%)  Wt(kg): --   GENERAL: NAD, well-groomed, well-developed  NECK: Supple, No JVD  NERVOUS SYSTEM:  Alert & Oriented X3, Motor Strength 5/5 B/L upper and lower extremities; DTRs 2+ intact and symmetric  CHEST/LUNG: Clear to auscultation bilaterally; No rales, rhonchi, wheezing, or rubs  HEART: Regular rate and rhythm; No murmurs, rubs, or gallops  ABDOMEN: Soft, Nontender, Nondistended; Bowel sounds present  EXTREMITIES:  2+ Peripheral Pulses, No clubbing, cyanosis, or edema        MEDICATIONS  (STANDING):  amLODIPine   Tablet 5 milliGRAM(s) Oral daily  atorvastatin 80 milliGRAM(s) Oral at bedtime  cefTRIAXone   IVPB 2000 milliGRAM(s) IV Intermittent every 24 hours  heparin  Infusion.  Unit(s)/Hr (17 mL/Hr) IV Continuous <Continuous>  mycophenolate mofetil 1500 milliGRAM(s) Oral daily  mycophenolate mofetil 1000 milliGRAM(s) Oral at bedtime  ofloxacin 0.3% Solution 1 Drop(s) Left EYE four times a day  prednisoLONE acetate 1% Suspension 1 Drop(s) Left EYE four times a day  predniSONE   Tablet 30 milliGRAM(s) Oral daily    MEDICATIONS  (PRN):  acetaminophen     Tablet .. 650 milliGRAM(s) Oral every 6 hours PRN Temp greater or equal to 38C (100.4F), Mild Pain (1 - 3)  aluminum hydroxide/magnesium hydroxide/simethicone Suspension 30 milliLiter(s) Oral every 4 hours PRN Dyspepsia  heparin   Injectable 7500 Unit(s) IV Push every 6 hours PRN For aPTT less than 40  heparin   Injectable 3500 Unit(s) IV Push every 6 hours PRN For aPTT between 40 - 57  melatonin 3 milliGRAM(s) Oral at bedtime PRN Insomnia  morphine  - Injectable 2 milliGRAM(s) IV Push every 6 hours PRN Moderate Pain (4 - 6)  ondansetron Injectable 4 milliGRAM(s) IV Push every 8 hours PRN Nausea and/or Vomiting  polyethylene glycol 3350 17 Gram(s) Oral two times a day PRN Constipation      LABS:                        14.3   7.37  )-----------( 241      ( 10 Jul 2025 06:00 )             44.3     07-10    139  |  103  |  20  ----------------------------<  100[H]  4.1   |  30  |  0.99    Ca    9.2      10 Jul 2025 06:00      PTT - ( 10 Jul 2025 06:00 )  PTT:67.7 sec  Urinalysis Basic - ( 10 Jul 2025 06:00 )    Color: x / Appearance: x / SG: x / pH: x  Gluc: 100 mg/dL / Ketone: x  / Bili: x / Urobili: x   Blood: x / Protein: x / Nitrite: x   Leuk Esterase: x / RBC: x / WBC x   Sq Epi: x / Non Sq Epi: x / Bacteria: x        CAPILLARY BLOOD GLUCOSE          CAPILLARY BLOOD GLUCOSE        CAPILLARY BLOOD GLUCOSE                RECENT CULTURES:      RADIOLOGY & ADDITIONAL TESTS:                        DVT/GI ppx  Discussed with pt @ bedside

## 2025-07-10 NOTE — CASE MANAGEMENT PROGRESS NOTE - NSCMPROGRESSNOTE_GEN_ALL_CORE
Per treatment team rounds Pt. Pt. still acute on Rocephin pending blood c/x results, Heparin drip for Splenic and Renal Infarcts s/p Cataract surgery off Anticoagulation.  Pt. Afebrile.  on tele monitoring Hx ablation 10/2024, RA, voiding, LE doppler negative, scleritis on po steroids  S/P Cataract surgery wearing an eye shield.   Heme consult, cardio,  ID following.        H&P   55y Male with a hx iof HTN, and Afib s/p multiple ablations, atrial flutter s/p ablation, CVA, ostium secundum ASD, hx of RA on  Celcept and scleritis complaining of left lower quadrant abdominal pain since yesterday.  Patient denies fevers chills nausea vomiting diarrhea dysuria hematuria frequency.  Patient relates he had cataract surgery on his left eye yesterday symptoms began after he was home from the surgery.    CM available if needed.

## 2025-07-10 NOTE — CHART NOTE - NSCHARTNOTEFT_GEN_A_CORE
As per hematology patient negative for antiphospholipid syndrome, patient to be restarted on Eliquis tonight and heparin discontinued

## 2025-07-10 NOTE — DISCHARGE NOTE PROVIDER - CARE PROVIDER_API CALL
Jeronimo Miller  Internal Medicine  [address], [address]  [place] [address]  Phone: [phone]  Fax: [phone]  Follow Up Time:

## 2025-07-10 NOTE — DISCHARGE NOTE NURSING/CASE MANAGEMENT/SOCIAL WORK - FINANCIAL ASSISTANCE
[facility] provides services at a reduced cost to those who are determined to be eligible through [facility]’s financial assistance program. Information regarding [facility]’s financial assistance program can be found by going to [email or web address] or by calling [phone].

## 2025-07-10 NOTE — DISCHARGE NOTE PROVIDER - NSDCCPCAREPLAN_GEN_ALL_CORE_FT
PRINCIPAL DISCHARGE DIAGNOSIS  Diagnosis: Splenic infarct  Assessment and Plan of Treatment: continue on eliquis  hemonc cleared  anti phospholipid syndrome ruled out      SECONDARY DISCHARGE DIAGNOSES  Diagnosis: Kidney infarction  Assessment and Plan of Treatment:     Diagnosis: Pain, abdominal  Assessment and Plan of Treatment:     Diagnosis: Chronic atrial fibrillation  Assessment and Plan of Treatment:     Diagnosis: Rheumatoid arthritis  Assessment and Plan of Treatment:     Diagnosis: Fever  Assessment and Plan of Treatment:     Diagnosis: H/O scleritis  Assessment and Plan of Treatment:     Diagnosis: Constipation  Assessment and Plan of Treatment:

## 2025-07-14 LAB
CULTURE RESULTS: SIGNIFICANT CHANGE UP
CULTURE RESULTS: SIGNIFICANT CHANGE UP
SPECIMEN SOURCE: SIGNIFICANT CHANGE UP
SPECIMEN SOURCE: SIGNIFICANT CHANGE UP

## 2025-07-14 RX ORDER — CEFUROXIME AXETIL 500 MG/1
500 TABLET, FILM COATED ORAL
Refills: 0 | Status: ACTIVE | COMMUNITY
Start: 2025-07-14

## 2025-07-15 ENCOUNTER — APPOINTMENT (OUTPATIENT)
Dept: INTERNAL MEDICINE | Facility: CLINIC | Age: 55
End: 2025-07-15
Payer: COMMERCIAL

## 2025-07-15 ENCOUNTER — NON-APPOINTMENT (OUTPATIENT)
Age: 55
End: 2025-07-15

## 2025-07-15 ENCOUNTER — APPOINTMENT (OUTPATIENT)
Dept: OPHTHALMOLOGY | Facility: CLINIC | Age: 55
End: 2025-07-15
Payer: OTHER GOVERNMENT

## 2025-07-15 VITALS — DIASTOLIC BLOOD PRESSURE: 82 MMHG | SYSTOLIC BLOOD PRESSURE: 126 MMHG

## 2025-07-15 VITALS — BODY MASS INDEX: 33.05 KG/M2 | OXYGEN SATURATION: 96 % | WEIGHT: 237 LBS | TEMPERATURE: 98.3 F | HEART RATE: 75 BPM

## 2025-07-15 PROBLEM — Z09 HOSPITAL DISCHARGE FOLLOW-UP: Status: ACTIVE | Noted: 2025-07-15

## 2025-07-15 PROBLEM — R50.9 FEVER: Status: ACTIVE | Noted: 2025-07-15

## 2025-07-15 PROCEDURE — 36415 COLL VENOUS BLD VENIPUNCTURE: CPT

## 2025-07-15 PROCEDURE — 99496 TRANSJ CARE MGMT HIGH F2F 7D: CPT

## 2025-07-15 PROCEDURE — 99024 POSTOP FOLLOW-UP VISIT: CPT

## 2025-07-15 PROCEDURE — 92134 CPTRZ OPH DX IMG PST SGM RTA: CPT | Mod: NC

## 2025-07-16 PROBLEM — R74.01 TRANSAMINITIS: Status: ACTIVE | Noted: 2025-07-15

## 2025-07-16 LAB
ALBUMIN SERPL ELPH-MCNC: 4.5 G/DL
ALP BLD-CCNC: 118 U/L
ALT SERPL-CCNC: 84 U/L
ANION GAP SERPL CALC-SCNC: 12 MMOL/L
AST SERPL-CCNC: 42 U/L
BILIRUB SERPL-MCNC: 0.2 MG/DL
BUN SERPL-MCNC: 27 MG/DL
CALCIUM SERPL-MCNC: 10.1 MG/DL
CHLORIDE SERPL-SCNC: 101 MMOL/L
CO2 SERPL-SCNC: 26 MMOL/L
CREAT SERPL-MCNC: 1.05 MG/DL
EGFRCR SERPLBLD CKD-EPI 2021: 84 ML/MIN/1.73M2
GLUCOSE SERPL-MCNC: 91 MG/DL
POTASSIUM SERPL-SCNC: 4.4 MMOL/L
PROT SERPL-MCNC: 7.4 G/DL
SODIUM SERPL-SCNC: 139 MMOL/L

## 2025-07-17 ENCOUNTER — TRANSCRIPTION ENCOUNTER (OUTPATIENT)
Age: 55
End: 2025-07-17

## 2025-07-21 ENCOUNTER — OUTPATIENT (OUTPATIENT)
Dept: OUTPATIENT SERVICES | Facility: HOSPITAL | Age: 55
LOS: 1 days | End: 2025-07-21
Payer: COMMERCIAL

## 2025-07-21 ENCOUNTER — APPOINTMENT (OUTPATIENT)
Dept: CARDIOTHORACIC SURGERY | Facility: CLINIC | Age: 55
End: 2025-07-21
Payer: COMMERCIAL

## 2025-07-21 VITALS
HEIGHT: 71 IN | TEMPERATURE: 96.9 F | OXYGEN SATURATION: 97 % | BODY MASS INDEX: 32.9 KG/M2 | DIASTOLIC BLOOD PRESSURE: 72 MMHG | WEIGHT: 235 LBS | SYSTOLIC BLOOD PRESSURE: 131 MMHG | HEART RATE: 74 BPM

## 2025-07-21 DIAGNOSIS — Z98.890 OTHER SPECIFIED POSTPROCEDURAL STATES: Chronic | ICD-10-CM

## 2025-07-21 DIAGNOSIS — Z01.818 ENCOUNTER FOR OTHER PREPROCEDURAL EXAMINATION: ICD-10-CM

## 2025-07-21 LAB
ALBUMIN SERPL ELPH-MCNC: 4.4 G/DL — SIGNIFICANT CHANGE UP (ref 3.3–5)
ALP SERPL-CCNC: 114 U/L — SIGNIFICANT CHANGE UP (ref 40–120)
ALT FLD-CCNC: 45 U/L — SIGNIFICANT CHANGE UP (ref 10–45)
ANION GAP SERPL CALC-SCNC: 13 MMOL/L — SIGNIFICANT CHANGE UP (ref 5–17)
APTT BLD: 30.9 SEC — SIGNIFICANT CHANGE UP (ref 26.1–36.8)
AST SERPL-CCNC: 29 U/L — SIGNIFICANT CHANGE UP (ref 10–40)
BASOPHILS # BLD AUTO: 0.03 K/UL — SIGNIFICANT CHANGE UP (ref 0–0.2)
BASOPHILS NFR BLD AUTO: 0.6 % — SIGNIFICANT CHANGE UP (ref 0–2)
BILIRUB SERPL-MCNC: 0.3 MG/DL — SIGNIFICANT CHANGE UP (ref 0.2–1.2)
BLD GP AB SCN SERPL QL: NEGATIVE — SIGNIFICANT CHANGE UP
BUN SERPL-MCNC: 21 MG/DL — SIGNIFICANT CHANGE UP (ref 7–23)
CALCIUM SERPL-MCNC: 9.9 MG/DL — SIGNIFICANT CHANGE UP (ref 8.4–10.5)
CHLORIDE SERPL-SCNC: 102 MMOL/L — SIGNIFICANT CHANGE UP (ref 96–108)
CO2 SERPL-SCNC: 27 MMOL/L — SIGNIFICANT CHANGE UP (ref 22–31)
CREAT SERPL-MCNC: 1.06 MG/DL — SIGNIFICANT CHANGE UP (ref 0.5–1.3)
EGFR: 83 ML/MIN/1.73M2 — SIGNIFICANT CHANGE UP
EGFR: 83 ML/MIN/1.73M2 — SIGNIFICANT CHANGE UP
EOSINOPHIL # BLD AUTO: 0.05 K/UL — SIGNIFICANT CHANGE UP (ref 0–0.5)
EOSINOPHIL NFR BLD AUTO: 1.1 % — SIGNIFICANT CHANGE UP (ref 0–6)
GLUCOSE SERPL-MCNC: 112 MG/DL — HIGH (ref 70–99)
HCT VFR BLD CALC: 45.6 % — SIGNIFICANT CHANGE UP (ref 39–50)
HGB BLD-MCNC: 14.4 G/DL — SIGNIFICANT CHANGE UP (ref 13–17)
IMM GRANULOCYTES # BLD AUTO: 0.02 K/UL — SIGNIFICANT CHANGE UP (ref 0–0.07)
IMM GRANULOCYTES NFR BLD AUTO: 0.4 % — SIGNIFICANT CHANGE UP (ref 0–0.9)
INR BLD: 1.05 — SIGNIFICANT CHANGE UP (ref 0.85–1.16)
LYMPHOCYTES # BLD AUTO: 1.16 K/UL — SIGNIFICANT CHANGE UP (ref 1–3.3)
LYMPHOCYTES NFR BLD AUTO: 24.6 % — SIGNIFICANT CHANGE UP (ref 13–44)
MCHC RBC-ENTMCNC: 27.5 PG — SIGNIFICANT CHANGE UP (ref 27–34)
MCHC RBC-ENTMCNC: 31.6 G/DL — LOW (ref 32–36)
MCV RBC AUTO: 87.2 FL — SIGNIFICANT CHANGE UP (ref 80–100)
MONOCYTES # BLD AUTO: 0.31 K/UL — SIGNIFICANT CHANGE UP (ref 0–0.9)
MONOCYTES NFR BLD AUTO: 6.6 % — SIGNIFICANT CHANGE UP (ref 2–14)
NEUTROPHILS # BLD AUTO: 3.14 K/UL — SIGNIFICANT CHANGE UP (ref 1.8–7.4)
NEUTROPHILS NFR BLD AUTO: 66.7 % — SIGNIFICANT CHANGE UP (ref 43–77)
NRBC # BLD AUTO: 0 K/UL — SIGNIFICANT CHANGE UP (ref 0–0)
NRBC # FLD: 0 K/UL — SIGNIFICANT CHANGE UP (ref 0–0)
NRBC BLD AUTO-RTO: 0 /100 WBCS — SIGNIFICANT CHANGE UP (ref 0–0)
PLATELET # BLD AUTO: 369 K/UL — SIGNIFICANT CHANGE UP (ref 150–400)
PMV BLD: 8.8 FL — SIGNIFICANT CHANGE UP (ref 7–13)
POTASSIUM SERPL-MCNC: 4.5 MMOL/L — SIGNIFICANT CHANGE UP (ref 3.5–5.3)
POTASSIUM SERPL-SCNC: 4.5 MMOL/L — SIGNIFICANT CHANGE UP (ref 3.5–5.3)
PROT SERPL-MCNC: 7.6 G/DL — SIGNIFICANT CHANGE UP (ref 6–8.3)
PROTHROM AB SERPL-ACNC: 12.3 SEC — SIGNIFICANT CHANGE UP (ref 9.9–13.4)
RBC # BLD: 5.23 M/UL — SIGNIFICANT CHANGE UP (ref 4.2–5.8)
RBC # FLD: 12.6 % — SIGNIFICANT CHANGE UP (ref 10.3–14.5)
RH IG SCN BLD-IMP: POSITIVE — SIGNIFICANT CHANGE UP
SODIUM SERPL-SCNC: 142 MMOL/L — SIGNIFICANT CHANGE UP (ref 135–145)
WBC # BLD: 4.71 K/UL — SIGNIFICANT CHANGE UP (ref 3.8–10.5)
WBC # FLD AUTO: 4.71 K/UL — SIGNIFICANT CHANGE UP (ref 3.8–10.5)

## 2025-07-21 PROCEDURE — 85610 PROTHROMBIN TIME: CPT

## 2025-07-21 PROCEDURE — 99205 OFFICE O/P NEW HI 60 MIN: CPT

## 2025-07-21 PROCEDURE — 85730 THROMBOPLASTIN TIME PARTIAL: CPT

## 2025-07-21 PROCEDURE — 86901 BLOOD TYPING SEROLOGIC RH(D): CPT

## 2025-07-21 PROCEDURE — 86850 RBC ANTIBODY SCREEN: CPT

## 2025-07-21 PROCEDURE — 80053 COMPREHEN METABOLIC PANEL: CPT

## 2025-07-21 PROCEDURE — 85025 COMPLETE CBC W/AUTO DIFF WBC: CPT

## 2025-07-21 PROCEDURE — 86900 BLOOD TYPING SEROLOGIC ABO: CPT

## 2025-08-05 ENCOUNTER — APPOINTMENT (OUTPATIENT)
Dept: OPHTHALMOLOGY | Facility: CLINIC | Age: 55
End: 2025-08-05

## 2025-08-06 ENCOUNTER — NON-APPOINTMENT (OUTPATIENT)
Age: 55
End: 2025-08-06

## 2025-08-07 ENCOUNTER — APPOINTMENT (OUTPATIENT)
Dept: CT IMAGING | Facility: CLINIC | Age: 55
End: 2025-08-07

## 2025-08-07 ENCOUNTER — APPOINTMENT (OUTPATIENT)
Age: 55
End: 2025-08-07
Payer: COMMERCIAL

## 2025-08-07 ENCOUNTER — NON-APPOINTMENT (OUTPATIENT)
Age: 55
End: 2025-08-07

## 2025-08-07 PROCEDURE — 99024 POSTOP FOLLOW-UP VISIT: CPT

## 2025-08-07 PROCEDURE — 92134 CPTRZ OPH DX IMG PST SGM RTA: CPT

## 2025-08-07 PROCEDURE — 75574 CT ANGIO HRT W/3D IMAGE: CPT

## 2025-08-18 ENCOUNTER — APPOINTMENT (OUTPATIENT)
Dept: HEMATOLOGY ONCOLOGY | Facility: CLINIC | Age: 55
End: 2025-08-18
Payer: COMMERCIAL

## 2025-08-18 VITALS
OXYGEN SATURATION: 96 % | HEIGHT: 71 IN | DIASTOLIC BLOOD PRESSURE: 84 MMHG | SYSTOLIC BLOOD PRESSURE: 142 MMHG | TEMPERATURE: 98.1 F | BODY MASS INDEX: 33.6 KG/M2 | RESPIRATION RATE: 18 BRPM | HEART RATE: 81 BPM | WEIGHT: 240 LBS

## 2025-08-18 DIAGNOSIS — D73.5 INFARCTION OF SPLEEN: ICD-10-CM

## 2025-08-18 DIAGNOSIS — N28.0 ISCHEMIA AND INFARCTION OF KIDNEY: ICD-10-CM

## 2025-08-18 PROCEDURE — 99204 OFFICE O/P NEW MOD 45 MIN: CPT

## 2025-08-24 LAB
ABR COMMENT: NORMAL
JAK2 GENE MUT ANL BLD/T: NORMAL
PNH GRANULOCYTES: 0.01 %
PNH MONOCYTES: 0 %
PNH RBC - COMPLETE: 0 %
PNH RBC - PARTIAL: 0 %

## 2025-08-25 ENCOUNTER — APPOINTMENT (OUTPATIENT)
Dept: CARDIOTHORACIC SURGERY | Facility: CLINIC | Age: 55
End: 2025-08-25
Payer: COMMERCIAL

## 2025-08-25 DIAGNOSIS — Q21.10 ATRIAL SEPTAL DEFECT, UNSPECIFIED: ICD-10-CM

## 2025-08-25 DIAGNOSIS — Q21.12 PATENT FORAMEN OVALE: ICD-10-CM

## 2025-08-25 PROCEDURE — 99215 OFFICE O/P EST HI 40 MIN: CPT | Mod: 95

## 2025-08-29 LAB
ALBUMIN SERPL ELPH-MCNC: 4.4 G/DL
ALP BLD-CCNC: 130 U/L
ALT SERPL-CCNC: 31 U/L
ANION GAP SERPL CALC-SCNC: 12 MMOL/L
APTT BLD: 32.2 SEC
AST SERPL-CCNC: 41 U/L
BASOPHILS # BLD AUTO: 0.04 K/UL
BASOPHILS NFR BLD AUTO: 1.1 %
BILIRUB SERPL-MCNC: 0.4 MG/DL
BUN SERPL-MCNC: 20 MG/DL
CALCIUM SERPL-MCNC: 9.4 MG/DL
CHLORIDE SERPL-SCNC: 103 MMOL/L
CO2 SERPL-SCNC: 24 MMOL/L
CREAT SERPL-MCNC: 1.02 MG/DL
EGFRCR SERPLBLD CKD-EPI 2021: 87 ML/MIN/1.73M2
EOSINOPHIL # BLD AUTO: 0.16 K/UL
EOSINOPHIL NFR BLD AUTO: 4.4 %
GLUCOSE SERPL-MCNC: 118 MG/DL
HCT VFR BLD CALC: 41.9 %
HGB BLD-MCNC: 13.5 G/DL
IMM GRANULOCYTES NFR BLD AUTO: 0.3 %
INR PPP: 1.06 RATIO
LYMPHOCYTES # BLD AUTO: 1.52 K/UL
LYMPHOCYTES NFR BLD AUTO: 42 %
MAN DIFF?: NORMAL
MCHC RBC-ENTMCNC: 27.4 PG
MCHC RBC-ENTMCNC: 32.2 G/DL
MCV RBC AUTO: 85.2 FL
MONOCYTES # BLD AUTO: 0.49 K/UL
MONOCYTES NFR BLD AUTO: 13.5 %
NEUTROPHILS # BLD AUTO: 1.4 K/UL
NEUTROPHILS NFR BLD AUTO: 38.7 %
PLATELET # BLD AUTO: 333 K/UL
POTASSIUM SERPL-SCNC: 4.5 MMOL/L
PROT SERPL-MCNC: 7.1 G/DL
PT BLD: 12.3 SEC
RBC # BLD: 4.92 M/UL
RBC # FLD: 12.6 %
SODIUM SERPL-SCNC: 139 MMOL/L
WBC # FLD AUTO: 3.62 K/UL

## 2025-08-29 RX ORDER — ENOXAPARIN SODIUM 120 MG/.8ML
120 INJECTION, SOLUTION SUBCUTANEOUS TWICE DAILY
Qty: 4 | Refills: 0 | Status: ACTIVE | COMMUNITY
Start: 2025-08-29 | End: 1900-01-01

## 2025-08-30 LAB — ABORH: NORMAL

## 2025-09-08 ENCOUNTER — APPOINTMENT (OUTPATIENT)
Dept: HEMATOLOGY ONCOLOGY | Facility: CLINIC | Age: 55
End: 2025-09-08

## 2025-09-09 ENCOUNTER — NON-APPOINTMENT (OUTPATIENT)
Age: 55
End: 2025-09-09

## 2025-09-09 ENCOUNTER — APPOINTMENT (OUTPATIENT)
Dept: OPHTHALMOLOGY | Facility: CLINIC | Age: 55
End: 2025-09-09
Payer: COMMERCIAL

## 2025-09-09 PROCEDURE — 92014 COMPRE OPH EXAM EST PT 1/>: CPT

## 2025-09-17 ENCOUNTER — RESULT REVIEW (OUTPATIENT)
Age: 55
End: 2025-09-17

## 2025-09-17 ENCOUNTER — APPOINTMENT (OUTPATIENT)
Dept: CARDIOTHORACIC SURGERY | Facility: HOSPITAL | Age: 55
End: 2025-09-17

## 2025-09-18 ENCOUNTER — TRANSCRIPTION ENCOUNTER (OUTPATIENT)
Age: 55
End: 2025-09-18

## 2025-09-25 PROBLEM — R06.02 SHORTNESS OF BREATH ON EXERTION: Status: ACTIVE | Noted: 2025-09-25

## 2025-09-25 PROBLEM — M54.9 BACK PAIN: Status: ACTIVE | Noted: 2025-09-25
